# Patient Record
Sex: MALE | Race: WHITE | Employment: OTHER | URBAN - METROPOLITAN AREA
[De-identification: names, ages, dates, MRNs, and addresses within clinical notes are randomized per-mention and may not be internally consistent; named-entity substitution may affect disease eponyms.]

---

## 2017-08-21 PROBLEM — F33.9 RECURRENT MAJOR DEPRESSIVE DISORDER (HCC): Status: ACTIVE | Noted: 2017-08-21

## 2017-09-22 PROBLEM — F41.1 ANXIETY STATE, UNSPECIFIED: Status: ACTIVE | Noted: 2017-09-22

## 2018-03-13 ENCOUNTER — OFFICE VISIT (OUTPATIENT)
Dept: PSYCHOLOGY | Age: 38
End: 2018-03-13
Payer: MEDICARE

## 2018-03-13 DIAGNOSIS — F33.41 RECURRENT MAJOR DEPRESSIVE DISORDER, IN PARTIAL REMISSION (HCC): Primary | ICD-10-CM

## 2018-03-13 DIAGNOSIS — F41.9 ANXIOUSNESS: ICD-10-CM

## 2018-03-13 PROCEDURE — 90832 PSYTX W PT 30 MINUTES: CPT | Performed by: CLINICAL NEUROPSYCHOLOGIST

## 2018-03-13 ASSESSMENT — PATIENT HEALTH QUESTIONNAIRE - PHQ9
SUM OF ALL RESPONSES TO PHQ9 QUESTIONS 1 & 2: 2
1. LITTLE INTEREST OR PLEASURE IN DOING THINGS: 1
SUM OF ALL RESPONSES TO PHQ QUESTIONS 1-9: 2
2. FEELING DOWN, DEPRESSED OR HOPELESS: 1

## 2018-03-13 NOTE — PATIENT INSTRUCTIONS
Heart-Focused Breathin. Center your attention at the level of your heart. 2.Breathing in and out  imagining that the breath is coming from the heart and going through the heart. 3. Attitude breathingthe easiest attitude to start with is \"appreciation\". Other attitudes would include for e.g.  forgiveness, peace, compassion and so on     4. Bring back into next session your own RX for changing attitudes, thoughts, and behaviors that are frustration/anxiety/ periodically sad or apathetic based. Replace them with the stated goals.

## 2018-03-14 ENCOUNTER — HOSPITAL ENCOUNTER (OUTPATIENT)
Age: 38
Discharge: HOME | End: 2018-03-14
Payer: MEDICARE

## 2018-03-14 DIAGNOSIS — G72.9 MYOPATHY: ICD-10-CM

## 2018-03-14 DIAGNOSIS — E11.69 DIABETES MELLITUS TYPE 2 IN OBESE (HCC): ICD-10-CM

## 2018-03-14 DIAGNOSIS — E66.9 DIABETES MELLITUS TYPE 2 IN OBESE (HCC): ICD-10-CM

## 2018-03-14 LAB
HBA1C MFR BLD: 6.2 % (ref 4.8–5.9)
TOTAL CK: 66 U/L (ref 20–200)

## 2018-03-14 PROCEDURE — 36415 COLL VENOUS BLD VENIPUNCTURE: CPT

## 2018-03-14 PROCEDURE — 83036 HEMOGLOBIN GLYCOSYLATED A1C: CPT

## 2018-03-14 PROCEDURE — 82550 ASSAY OF CK (CPK): CPT

## 2018-03-14 PROCEDURE — 82652 VIT D 1 25-DIHYDROXY: CPT

## 2018-03-16 LAB — VITAMIN D 1,25-DIHYDROXY: 48.9 PG/ML (ref 19.9–79.3)

## 2018-03-27 ENCOUNTER — OFFICE VISIT (OUTPATIENT)
Dept: PSYCHOLOGY | Age: 38
End: 2018-03-27
Payer: MEDICARE

## 2018-03-27 DIAGNOSIS — F33.40 RECURRENT MAJOR DEPRESSIVE DISORDER, IN REMISSION (HCC): Primary | ICD-10-CM

## 2018-03-27 DIAGNOSIS — F41.9 ANXIOUSNESS: ICD-10-CM

## 2018-03-27 PROCEDURE — 90832 PSYTX W PT 30 MINUTES: CPT | Performed by: CLINICAL NEUROPSYCHOLOGIST

## 2018-03-27 NOTE — PROGRESS NOTES
Behavioral Health Consultation  Zack Hernandez, Ph.D.  Psychologist  3/27/2018  2:28 PM      Time spent with Patient: 30 minutes  This is patient's 15th [facility] appointment    Reason for Consult: Depression, anxiety, stress, insomnia   Referring Provider: Bing Styles MD    Pt indicated understanding. Feedback given to PCP. S:  \"I use attitude breathing to help with motivation, gerald to learn new things. My work on my career in [address] [place] [address] is going well. I use all the things that we learned in these sessions and apply them to the depressive symptoms and to my career goals. \"    O:  MSE:    Appearance: Appropriately dressed in casual clothing, appropriately groomed, eye contact, good hygiene  Attitude: Cooperative, friendly, mild distress  Consciousness: Alert  Orientation: Oriented to time, place, person, general circumstance  Memory: Remote memory intact; short-term memory variable, per patient self-report   Attention/Concentration: Intact during session  Psychomotor Activity: Normal  Speech: Normal in rate and volume, wellarticulated  Mood: Mildly depressed, mildly anxious  Affect: Current with thought content and mood  Perception: Within normal limits  Thought Content:  Within normal limits   Thought Process: Logical, goalgoal-directed, coherent  Insight: Fair  Judgment: Intact  Morbid ideation: None   Suicide Assessment: No suicidal ideation      History:    Medications:   Current Outpatient Prescriptions   Medication Sig Dispense Refill    metFORMIN (GLUCOPHAGE) 1000 MG tablet Take 1 tablet by mouth daily (with breakfast) 90 tablet 0    betamethasone dipropionate (DIPROLENE) 0.05 % cream Ordered Per Podiatry-Dr. Shoshana Tomlinson  0    mupirocin (BACTROBAN) 2 % ointment Ordered per Podiatry-Dr. Maldonado Marie  0    sertraline (ZOLOFT) 25 MG tablet Take 1 tablet by mouth daily 90 tablet 0    lisinopril (PRINIVIL;ZESTRIL) 10 MG tablet Take 1 tablet by mouth daily 90 tablet 0    pravastatin (PRAVACHOL) 10 MG tablet Take 1

## 2018-04-05 ENCOUNTER — OFFICE VISIT (OUTPATIENT)
Dept: INTERNAL MEDICINE | Age: 38
End: 2018-04-05
Payer: MEDICARE

## 2018-04-05 VITALS
HEART RATE: 92 BPM | RESPIRATION RATE: 18 BRPM | SYSTOLIC BLOOD PRESSURE: 138 MMHG | TEMPERATURE: 98.4 F | WEIGHT: 315 LBS | DIASTOLIC BLOOD PRESSURE: 72 MMHG | BODY MASS INDEX: 42.66 KG/M2 | HEIGHT: 72 IN

## 2018-04-05 DIAGNOSIS — E78.00 PURE HYPERCHOLESTEROLEMIA: ICD-10-CM

## 2018-04-05 DIAGNOSIS — E11.9 CONTROLLED TYPE 2 DIABETES MELLITUS WITHOUT COMPLICATION, WITHOUT LONG-TERM CURRENT USE OF INSULIN (HCC): Primary | ICD-10-CM

## 2018-04-05 DIAGNOSIS — I10 ESSENTIAL HYPERTENSION: ICD-10-CM

## 2018-04-05 DIAGNOSIS — F33.9 RECURRENT MAJOR DEPRESSIVE DISORDER, REMISSION STATUS UNSPECIFIED (HCC): ICD-10-CM

## 2018-04-05 PROCEDURE — 99214 OFFICE O/P EST MOD 30 MIN: CPT | Performed by: INTERNAL MEDICINE

## 2018-04-05 PROCEDURE — 99212 OFFICE O/P EST SF 10 MIN: CPT | Performed by: INTERNAL MEDICINE

## 2018-04-05 PROCEDURE — G8417 CALC BMI ABV UP PARAM F/U: HCPCS | Performed by: INTERNAL MEDICINE

## 2018-04-05 PROCEDURE — 3044F HG A1C LEVEL LT 7.0%: CPT | Performed by: INTERNAL MEDICINE

## 2018-04-05 PROCEDURE — G8427 DOCREV CUR MEDS BY ELIG CLIN: HCPCS | Performed by: INTERNAL MEDICINE

## 2018-04-05 PROCEDURE — 2022F DILAT RTA XM EVC RTNOPTHY: CPT | Performed by: INTERNAL MEDICINE

## 2018-04-05 PROCEDURE — 1036F TOBACCO NON-USER: CPT | Performed by: INTERNAL MEDICINE

## 2018-04-05 RX ORDER — SERTRALINE HYDROCHLORIDE 25 MG/1
25 TABLET, FILM COATED ORAL DAILY
Qty: 90 TABLET | Refills: 1 | Status: SHIPPED | OUTPATIENT
Start: 2018-04-05 | End: 2018-08-28 | Stop reason: SDUPTHER

## 2018-04-05 RX ORDER — LISINOPRIL 10 MG/1
10 TABLET ORAL DAILY
Qty: 90 TABLET | Refills: 1 | Status: SHIPPED | OUTPATIENT
Start: 2018-04-05 | End: 2018-08-28 | Stop reason: SDUPTHER

## 2018-04-05 RX ORDER — GLUCOSAMINE HCL/CHONDROITIN SU 500-400 MG
CAPSULE ORAL
Qty: 100 STRIP | Refills: 2 | Status: SHIPPED | OUTPATIENT
Start: 2018-04-05 | End: 2018-08-28 | Stop reason: SDUPTHER

## 2018-04-05 ASSESSMENT — ENCOUNTER SYMPTOMS
EYES NEGATIVE: 1
RESPIRATORY NEGATIVE: 1
GASTROINTESTINAL NEGATIVE: 1

## 2018-04-10 ENCOUNTER — OFFICE VISIT (OUTPATIENT)
Dept: PSYCHOLOGY | Age: 38
End: 2018-04-10
Payer: MEDICARE

## 2018-04-10 DIAGNOSIS — F33.2 MAJOR DEPRESSIVE DISORDER, RECURRENT SEVERE WITHOUT PSYCHOTIC FEATURES (HCC): Primary | ICD-10-CM

## 2018-04-10 DIAGNOSIS — F41.9 ANXIOUSNESS: ICD-10-CM

## 2018-04-10 PROCEDURE — 90832 PSYTX W PT 30 MINUTES: CPT | Performed by: CLINICAL NEUROPSYCHOLOGIST

## 2018-04-24 ENCOUNTER — OFFICE VISIT (OUTPATIENT)
Dept: PSYCHOLOGY | Age: 38
End: 2018-04-24
Payer: MEDICARE

## 2018-04-24 DIAGNOSIS — F41.9 ANXIOUSNESS: ICD-10-CM

## 2018-04-24 DIAGNOSIS — F33.2 SEVERE EPISODE OF RECURRENT MAJOR DEPRESSIVE DISORDER, WITHOUT PSYCHOTIC FEATURES (HCC): Primary | ICD-10-CM

## 2018-04-24 PROCEDURE — 90832 PSYTX W PT 30 MINUTES: CPT | Performed by: CLINICAL NEUROPSYCHOLOGIST

## 2018-08-28 ENCOUNTER — OFFICE VISIT (OUTPATIENT)
Dept: INTERNAL MEDICINE | Age: 38
End: 2018-08-28
Payer: MEDICARE

## 2018-08-28 VITALS
WEIGHT: 315 LBS | RESPIRATION RATE: 18 BRPM | BODY MASS INDEX: 42.66 KG/M2 | DIASTOLIC BLOOD PRESSURE: 71 MMHG | HEIGHT: 72 IN | HEART RATE: 90 BPM | SYSTOLIC BLOOD PRESSURE: 130 MMHG | TEMPERATURE: 98.4 F

## 2018-08-28 DIAGNOSIS — I10 BENIGN ESSENTIAL HTN: ICD-10-CM

## 2018-08-28 DIAGNOSIS — F32.A DEPRESSION, UNSPECIFIED DEPRESSION TYPE: ICD-10-CM

## 2018-08-28 DIAGNOSIS — Z99.89 OSA ON CPAP: ICD-10-CM

## 2018-08-28 DIAGNOSIS — Z11.4 SCREENING FOR HIV (HUMAN IMMUNODEFICIENCY VIRUS): ICD-10-CM

## 2018-08-28 DIAGNOSIS — E11.9 CONTROLLED TYPE 2 DIABETES MELLITUS WITHOUT COMPLICATION, WITHOUT LONG-TERM CURRENT USE OF INSULIN (HCC): ICD-10-CM

## 2018-08-28 DIAGNOSIS — G47.33 OSA ON CPAP: ICD-10-CM

## 2018-08-28 DIAGNOSIS — E11.69 DIABETES MELLITUS TYPE 2 IN OBESE (HCC): ICD-10-CM

## 2018-08-28 DIAGNOSIS — E66.9 DIABETES MELLITUS TYPE 2 IN OBESE (HCC): ICD-10-CM

## 2018-08-28 DIAGNOSIS — E78.5 HYPERLIPIDEMIA, UNSPECIFIED HYPERLIPIDEMIA TYPE: Primary | ICD-10-CM

## 2018-08-28 DIAGNOSIS — F33.9 RECURRENT MAJOR DEPRESSIVE DISORDER, REMISSION STATUS UNSPECIFIED (HCC): ICD-10-CM

## 2018-08-28 DIAGNOSIS — I10 ESSENTIAL HYPERTENSION: ICD-10-CM

## 2018-08-28 LAB — HBA1C MFR BLD: 6.4 %

## 2018-08-28 PROCEDURE — G8427 DOCREV CUR MEDS BY ELIG CLIN: HCPCS | Performed by: INTERNAL MEDICINE

## 2018-08-28 PROCEDURE — 1036F TOBACCO NON-USER: CPT | Performed by: INTERNAL MEDICINE

## 2018-08-28 PROCEDURE — G8417 CALC BMI ABV UP PARAM F/U: HCPCS | Performed by: INTERNAL MEDICINE

## 2018-08-28 PROCEDURE — 2022F DILAT RTA XM EVC RTNOPTHY: CPT | Performed by: INTERNAL MEDICINE

## 2018-08-28 PROCEDURE — 3044F HG A1C LEVEL LT 7.0%: CPT | Performed by: INTERNAL MEDICINE

## 2018-08-28 PROCEDURE — 99212 OFFICE O/P EST SF 10 MIN: CPT | Performed by: INTERNAL MEDICINE

## 2018-08-28 PROCEDURE — 83036 HEMOGLOBIN GLYCOSYLATED A1C: CPT | Performed by: INTERNAL MEDICINE

## 2018-08-28 PROCEDURE — 99213 OFFICE O/P EST LOW 20 MIN: CPT | Performed by: INTERNAL MEDICINE

## 2018-08-28 RX ORDER — ROSUVASTATIN CALCIUM 20 MG/1
20 TABLET, COATED ORAL NIGHTLY
Qty: 30 TABLET | Refills: 3 | Status: SHIPPED
Start: 2018-08-28 | End: 2020-06-18 | Stop reason: ALTCHOICE

## 2018-08-28 RX ORDER — SERTRALINE HYDROCHLORIDE 25 MG/1
25 TABLET, FILM COATED ORAL DAILY
Qty: 90 TABLET | Refills: 3 | Status: SHIPPED | OUTPATIENT
Start: 2018-08-28 | End: 2019-09-11 | Stop reason: SDUPTHER

## 2018-08-28 RX ORDER — GLUCOSAMINE HCL/CHONDROITIN SU 500-400 MG
CAPSULE ORAL
Qty: 100 STRIP | Refills: 3 | Status: SHIPPED | OUTPATIENT
Start: 2018-08-28 | End: 2019-09-11 | Stop reason: SDUPTHER

## 2018-08-28 RX ORDER — LISINOPRIL 10 MG/1
10 TABLET ORAL DAILY
Qty: 90 TABLET | Refills: 3 | Status: SHIPPED | OUTPATIENT
Start: 2018-08-28 | End: 2019-09-11 | Stop reason: SDUPTHER

## 2018-08-28 RX ORDER — LANCETS 30 GAUGE
EACH MISCELLANEOUS
Qty: 100 EACH | Refills: 3 | Status: SHIPPED | OUTPATIENT
Start: 2018-08-28 | End: 2019-09-11 | Stop reason: SDUPTHER

## 2018-08-28 ASSESSMENT — ENCOUNTER SYMPTOMS
NAUSEA: 0
SORE THROAT: 0
ABDOMINAL PAIN: 0
HEARTBURN: 0
COUGH: 0
ORTHOPNEA: 0
BLURRED VISION: 0
VOMITING: 0
BLOOD IN STOOL: 0
SHORTNESS OF BREATH: 0
SPUTUM PRODUCTION: 0
DIARRHEA: 0
WHEEZING: 0
STRIDOR: 0
HEMOPTYSIS: 0

## 2018-08-28 NOTE — PROGRESS NOTES
(ZOLOFT) 25 MG tablet; Take 1 tablet by mouth daily    Essential hypertension  -     lisinopril (PRINIVIL;ZESTRIL) 10 MG tablet; Take 1 tablet by mouth daily  -     BASIC METABOLIC PANEL; Future    Screening for HIV (human immunodeficiency virus)  -     HIV Screen;  Future    WILL on CPAP    Diabetes mellitus type 2 in obese (HCC)    Benign essential HTN    Depression, unspecified depression type    Other orders  -     Cancel:  DIABETES FOOT EXAM        RTC:     3 months     I have reviewed my findings and recommendations with Sukhwinder Ervin and Dr Chilo Jarvis MD, PGY-3  Internal Medicine Resident     8/28/2018 3:28 PM

## 2018-08-28 NOTE — PATIENT INSTRUCTIONS
Patient Education        Learning About Bariatric Surgery  What is bariatric surgery? Bariatric surgery is surgery to help you lose weight. This type of surgery is only used for people who are very overweight and have not been able to lose weight with diet and exercise. This surgery makes the stomach smaller. Some types of surgery also change the connection between your stomach and intestines. How is bariatric surgery done? Bariatric surgery may be either \"open\" or \"laparoscopic. \" Open surgery is done through a large cut (incision) in the belly. Laparoscopic surgery is done through several small cuts. The doctor puts a lighted tube, or scope, and other surgical tools through small cuts in your belly. The doctor is able to see your organs with the scope. There are different types of bariatric surgery. Gastric sleeve surgery  The surgery is usually done through several small incisions in the belly. The doctor removes more than half of your stomach. This leaves a thin sleeve, or tube, that is about the size of a banana. Because part of your stomach has been removed, this can't be reversed. Nato-en-Y gastric bypass surgery  Nato-en-Y (say \"gab-en-why\") surgery changes the connection between the stomach and the intestines. The doctor separates a section of your stomach from the rest of your stomach. This makes a small pouch. The new pouch will hold the food you eat. The doctor connects the stomach pouch to the middle part of the small intestine. Gastric banding surgery  The surgery is usually done through several small incisions in the belly. The doctor wraps a band around the upper part of the stomach. This creates a small pouch. The small size of the pouch means that you will get full after you eat just a small amount of food. The doctor can inflate or deflate the band to adjust the size. This lets the doctor adjust how quickly food passes from the new pouch into the stomach.  It does not change the to work or their usual routine in about 2 to 4 weeks. Follow-up care is a key part of your treatment and safety. Be sure to make and go to all appointments, and call your doctor if you are having problems. It's also a good idea to know your test results and keep a list of the medicines you take. What happens before surgery?   Surgery can be stressful. This information will help you understand what you can expect. And it will help you safely prepare for surgery.   Preparing for surgery    · Understand exactly what surgery is planned, along with the risks, benefits, and other options. · Tell your doctors ALL the medicines, vitamins, supplements, and herbal remedies you take. Some of these can increase the risk of bleeding or interact with anesthesia.     · If you take blood thinners, such as warfarin (Coumadin), clopidogrel (Plavix), or aspirin, be sure to talk to your doctor. He or she will tell you if you should stop taking these medicines before your surgery. Make sure that you understand exactly what your doctor wants you to do.     · Your doctor will tell you which medicines to take or stop before your surgery. You may need to stop taking certain medicines a week or more before surgery. So talk to your doctor as soon as you can.     · If you have an advance directive, let your doctor know. It may include a living will and a durable power of  for health care. Bring a copy to the hospital. If you don't have one, you may want to prepare one. It lets your doctor and loved ones know your health care wishes. Doctors advise that everyone prepare these papers before any type of surgery or procedure.     · You may need to follow a clear liquid diet for several days before surgery. Your doctor will tell you how to do this. What happens on the day of surgery? · Follow the instructions exactly about when to stop eating and drinking. If you don't, your surgery may be canceled.  If your doctor told you to take your medicines on the day of surgery, take them with only a sip of water.     · Take a bath or shower before you come in for your surgery. Do not apply lotions, perfumes, deodorants, or nail polish.     · Do not shave the surgical site yourself.     · Take off all jewelry and piercings. And take out contact lenses, if you wear them.    At the hospital or surgery center   · Bring a picture ID.     · The area for surgery is often marked to make sure there are no errors.     · You will be kept comfortable and safe by your anesthesia provider. You will be asleep during the surgery.     · The surgery will take about 2 to 3 hours. Going home   · Be sure you have someone to drive you home. Anesthesia and pain medicine make it unsafe for you to drive.     · You will be given more specific instructions about recovering from your surgery. They will cover things like diet, wound care, follow-up care, driving, and getting back to your normal routine. When should you call your doctor? · You have questions or concerns.     · You don't understand how to prepare for your surgery.     · You become ill before the surgery (such as fever, flu, or a cold).     · You need to reschedule or have changed your mind about having the surgery. Where can you learn more? Go to [email or web address] and sign in to your [email or web address] account. Enter J026 in the [facility] box to learn more about \"Laparoscopic Nato-en-Y Gastric Bypass: Before Your Surgery. \"     If you do not have an account, please click on the \"Sign Up Now\" link. Current as of: September 10, 2017  Content Version: 11.7  © 0580-3417 "[employer]. Care instructions adapted under license by [facility] ([facility]). If you have questions about a medical condition or this instruction, always ask your healthcare professional. [address]  any warranty or liability for your use of this information.        Please start crestor 20

## 2018-10-29 ENCOUNTER — TELEPHONE (OUTPATIENT)
Dept: INTERNAL MEDICINE | Age: 38
End: 2018-10-29

## 2018-12-13 ENCOUNTER — TELEPHONE (OUTPATIENT)
Dept: ADMINISTRATIVE | Age: 38
End: 2018-12-13

## 2019-08-06 ENCOUNTER — TELEPHONE (OUTPATIENT)
Dept: INTERNAL MEDICINE | Age: 39
End: 2019-08-06

## 2019-08-06 NOTE — TELEPHONE ENCOUNTER
Pt requests med refills but has missed his last 2 appts and has not been seen since 08/2018 left message for pt to call to schedule an appt for this week so that his meds can be refilled

## 2019-09-06 ENCOUNTER — TELEPHONE (OUTPATIENT)
Dept: INTERNAL MEDICINE | Age: 39
End: 2019-09-06

## 2019-09-11 ENCOUNTER — OFFICE VISIT (OUTPATIENT)
Dept: INTERNAL MEDICINE | Age: 39
End: 2019-09-11
Payer: MEDICARE

## 2019-09-11 VITALS
WEIGHT: 315 LBS | BODY MASS INDEX: 42.66 KG/M2 | HEART RATE: 91 BPM | RESPIRATION RATE: 18 BRPM | DIASTOLIC BLOOD PRESSURE: 78 MMHG | TEMPERATURE: 97.6 F | SYSTOLIC BLOOD PRESSURE: 123 MMHG | HEIGHT: 72 IN

## 2019-09-11 DIAGNOSIS — E11.9 CONTROLLED TYPE 2 DIABETES MELLITUS WITHOUT COMPLICATION, WITHOUT LONG-TERM CURRENT USE OF INSULIN (HCC): Primary | ICD-10-CM

## 2019-09-11 DIAGNOSIS — E66.9 DIABETES MELLITUS TYPE 2 IN OBESE (HCC): ICD-10-CM

## 2019-09-11 DIAGNOSIS — E11.69 DIABETES MELLITUS TYPE 2 IN OBESE (HCC): ICD-10-CM

## 2019-09-11 DIAGNOSIS — E66.01 MORBID OBESITY WITH BMI OF 50.0-59.9, ADULT (HCC): ICD-10-CM

## 2019-09-11 DIAGNOSIS — E66.01 MORBID OBESITY (HCC): ICD-10-CM

## 2019-09-11 DIAGNOSIS — F33.9 RECURRENT MAJOR DEPRESSIVE DISORDER, REMISSION STATUS UNSPECIFIED (HCC): ICD-10-CM

## 2019-09-11 DIAGNOSIS — I10 ESSENTIAL HYPERTENSION: ICD-10-CM

## 2019-09-11 PROBLEM — F41.1 GAD (GENERALIZED ANXIETY DISORDER): Status: ACTIVE | Noted: 2019-09-11

## 2019-09-11 LAB — HBA1C MFR BLD: 7.3 %

## 2019-09-11 PROCEDURE — 3045F PR MOST RECENT HEMOGLOBIN A1C LEVEL 7.0-9.0%: CPT | Performed by: INTERNAL MEDICINE

## 2019-09-11 PROCEDURE — G8417 CALC BMI ABV UP PARAM F/U: HCPCS | Performed by: INTERNAL MEDICINE

## 2019-09-11 PROCEDURE — 1036F TOBACCO NON-USER: CPT | Performed by: INTERNAL MEDICINE

## 2019-09-11 PROCEDURE — 83036 HEMOGLOBIN GLYCOSYLATED A1C: CPT | Performed by: INTERNAL MEDICINE

## 2019-09-11 PROCEDURE — G8427 DOCREV CUR MEDS BY ELIG CLIN: HCPCS | Performed by: INTERNAL MEDICINE

## 2019-09-11 PROCEDURE — 99214 OFFICE O/P EST MOD 30 MIN: CPT | Performed by: INTERNAL MEDICINE

## 2019-09-11 PROCEDURE — 99213 OFFICE O/P EST LOW 20 MIN: CPT | Performed by: INTERNAL MEDICINE

## 2019-09-11 PROCEDURE — 2022F DILAT RTA XM EVC RTNOPTHY: CPT | Performed by: INTERNAL MEDICINE

## 2019-09-11 RX ORDER — GLUCOSAMINE HCL/CHONDROITIN SU 500-400 MG
CAPSULE ORAL
Qty: 100 STRIP | Refills: 3 | Status: SHIPPED
Start: 2019-09-11 | End: 2020-03-12 | Stop reason: SDUPTHER

## 2019-09-11 RX ORDER — LISINOPRIL 10 MG/1
10 TABLET ORAL DAILY
Qty: 90 TABLET | Refills: 3 | Status: SHIPPED
Start: 2019-09-11 | End: 2020-03-12 | Stop reason: SDUPTHER

## 2019-09-11 RX ORDER — LANCETS 30 GAUGE
EACH MISCELLANEOUS
Qty: 100 EACH | Refills: 3 | Status: SHIPPED
Start: 2019-09-11 | End: 2020-03-12 | Stop reason: SDUPTHER

## 2019-09-11 NOTE — PROGRESS NOTES
current use of insulin (HCC)  -     POCT glycosylated hemoglobin (Hb A1C)    Recurrent major depressive disorder, remission status unspecified ([facility]    Essential hypertension        I have reviewed all pertient PMHx, PSHx, FamHx, Social Hx, medications, and allergies and updated history as appropriate. RTC:    I have reviewed my findings and recommendations with Maurizio Madrigal and Dr Kurtis Gonsalez.     Juan David Jacobs MD PGY-1   9/11/2019 2:33 PM

## 2019-09-13 PROBLEM — E66.01 MORBID OBESITY WITH BMI OF 50.0-59.9, ADULT (HCC): Status: ACTIVE | Noted: 2019-09-13

## 2020-03-12 ENCOUNTER — OFFICE VISIT (OUTPATIENT)
Dept: INTERNAL MEDICINE | Age: 40
End: 2020-03-12
Payer: MEDICARE

## 2020-03-12 VITALS
HEIGHT: 72 IN | RESPIRATION RATE: 16 BRPM | TEMPERATURE: 98.2 F | SYSTOLIC BLOOD PRESSURE: 134 MMHG | WEIGHT: 315 LBS | DIASTOLIC BLOOD PRESSURE: 78 MMHG | HEART RATE: 92 BPM | BODY MASS INDEX: 42.66 KG/M2

## 2020-03-12 LAB — HBA1C MFR BLD: 7 %

## 2020-03-12 PROCEDURE — G8427 DOCREV CUR MEDS BY ELIG CLIN: HCPCS | Performed by: INTERNAL MEDICINE

## 2020-03-12 PROCEDURE — 99213 OFFICE O/P EST LOW 20 MIN: CPT | Performed by: INTERNAL MEDICINE

## 2020-03-12 PROCEDURE — 1036F TOBACCO NON-USER: CPT | Performed by: INTERNAL MEDICINE

## 2020-03-12 PROCEDURE — G8484 FLU IMMUNIZE NO ADMIN: HCPCS | Performed by: INTERNAL MEDICINE

## 2020-03-12 PROCEDURE — G8417 CALC BMI ABV UP PARAM F/U: HCPCS | Performed by: INTERNAL MEDICINE

## 2020-03-12 PROCEDURE — 2022F DILAT RTA XM EVC RTNOPTHY: CPT | Performed by: INTERNAL MEDICINE

## 2020-03-12 PROCEDURE — 3046F HEMOGLOBIN A1C LEVEL >9.0%: CPT | Performed by: INTERNAL MEDICINE

## 2020-03-12 PROCEDURE — 83036 HEMOGLOBIN GLYCOSYLATED A1C: CPT | Performed by: INTERNAL MEDICINE

## 2020-03-12 RX ORDER — GLUCOSAMINE HCL/CHONDROITIN SU 500-400 MG
CAPSULE ORAL
Qty: 100 STRIP | Refills: 2 | Status: SHIPPED
Start: 2020-03-12 | End: 2020-09-03 | Stop reason: SDUPTHER

## 2020-03-12 RX ORDER — LISINOPRIL 10 MG/1
10 TABLET ORAL DAILY
Qty: 90 TABLET | Refills: 0 | Status: SHIPPED
Start: 2020-03-12 | End: 2020-09-03 | Stop reason: SDUPTHER

## 2020-03-12 RX ORDER — LANCETS 30 GAUGE
EACH MISCELLANEOUS
Qty: 100 EACH | Refills: 2 | Status: SHIPPED
Start: 2020-03-12 | End: 2020-09-03 | Stop reason: SDUPTHER

## 2020-03-12 ASSESSMENT — ENCOUNTER SYMPTOMS
VOMITING: 0
COUGH: 0
ABDOMINAL PAIN: 0
DIARRHEA: 0
SORE THROAT: 0
SHORTNESS OF BREATH: 0
NAUSEA: 0

## 2020-03-12 NOTE — PATIENT INSTRUCTIONS
Thank you for coming to your appointment today. Please make sure to do the following:  - New changes in medication:   - Continue the medications as listed  - Get labs drawn (Nothing to eat or drink for 12 hours prior to having your blood work done) before next visit  - Schedule your appointments    Referrals have been made to surgery [phone] and podiatry [phone]: If you do not hear from the office in 7-10 days, please call the number listed. Follow up as scheduled. Please call with any questions or concerns.  [phone]

## 2020-03-12 NOTE — PROGRESS NOTES
[facility]  InternalMedicine Residency Program  ACC Note      SUBJECTIVE:  CC: had no chief complaint listed for this encounter. HPI:Maurizio De Jesus 41 y.o. male with PMH of HTN, HLD, T2DM, WILL on CPAP, morbid obesity, anxiety/depression, presents to the [facility] for routine visit and meds refill. He has been doing well. Denies any pain today. Weight loss education given in room. Patient's past medical history and medication list reviewed. Lab results reviewed. All questions answered. Review of Systems   Constitutional: Negative for chills and fever. HENT: Negative for sore throat. Respiratory: Negative for cough and shortness of breath. Cardiovascular: Negative for chest pain and leg swelling. Gastrointestinal: Negative for abdominal pain, diarrhea, nausea and vomiting. Genitourinary: Negative for dysuria and frequency. Neurological: Negative for headaches. Current Outpatient Medications on File Prior to Visit   Medication Sig Dispense Refill    metFORMIN (GLUCOPHAGE) 1000 MG tablet Take 1 tablet by mouth 2 times daily (with meals) 60 tablet 0    sertraline (ZOLOFT) 50 MG tablet Take 1 tablet by mouth daily 30 tablet 0    lisinopril (PRINIVIL;ZESTRIL) 10 MG tablet Take 1 tablet by mouth daily 90 tablet 3    blood glucose monitor strips Accucheck meter-pt check BS daily. 100 strip 3    Lancets MISC Accucheck or what insurance covers. 100 each 3    rosuvastatin (CRESTOR) 20 MG tablet Take 1 tablet by mouth nightly (Patient not taking: Reported on 9/11/2019) 30 tablet 3    betamethasone dipropionate (DIPROLENE) 0.05 % cream Ordered Per Podiatry-Dr. Juan R Dang  0    mupirocin (BACTROBAN) 2 % ointment Ordered per Podiatry-Dr. Brian Flores  0    Misc. Devices MISC Give one Accucheck glucometer monitor or whatever the insurance approves.  1 Device 0    LORATADINE PO Take 10 mg by mouth as needed       No current facility-administered medications on file prior to visit. OBJECTIVE:    VS: There were no vitals taken for this visit. Physical Exam  HENT:      Head: Normocephalic. Neck:      Musculoskeletal: Normal range of motion and neck supple. Cardiovascular:      Rate and Rhythm: Normal rate and regular rhythm. Heart sounds: Normal heart sounds. No murmur. Pulmonary:      Breath sounds: Normal breath sounds. No wheezing or rales. Abdominal:      General: Bowel sounds are normal. There is no distension. Palpations: Abdomen is soft. Tenderness: There is no abdominal tenderness. Lymphadenopathy:      Cervical: No cervical adenopathy. Skin:     General: Skin is warm and dry. Neurological:      Mental Status: He is alert. ASSESSMENT/PLAN:  There are no diagnoses linked to this encounter. HTN-controlled  - Continue lisinopril 10mg daily     HLD  - lipid panel ordered, not done yet  - Self stopped rosuvastatin due to tingling in finger and did not tolerate atorvastatin due to cramps  - [facility] lipid panel in 2017. ASCVD score 7.9%  - f/u repeat lipid panel. Consider to start pravastatin next visit    T2DM  - HbA1C 7.3 (9/11/2019)- 7.0  today   - Cont metformin 1000mg BID    WILL on CPAP    Morbid obesity, class 3  - Made surgery referral for possible bariatic intervention    Anxiety/depression  - on citalopram to 50mg  - Seen by Kathe Watkins  - Seen by ophthalmology in Sep or Oct last year. Eye exam was negative. Will get record   - Made referral to podiatry  - Ordered BMP, lipids not done yet    RTC:  in 3 months. Call in if having any questions.     I have reviewed my findings and recommendations with Abel Dickey and Dr Fabian Habermann, MD PGY-3  3/12/2020 3:05 PM

## 2020-04-08 ENCOUNTER — TELEPHONE (OUTPATIENT)
Dept: INTERNAL MEDICINE | Age: 40
End: 2020-04-08

## 2020-04-08 NOTE — TELEPHONE ENCOUNTER
Received fax refill request from pharmacy. Pharmacy asking for a refill on metformin. Spoke with pharmacy staff to confirm they have script on file from 03/12/2020. States script is on file and no new order needed.

## 2020-05-06 ENCOUNTER — TELEMEDICINE (OUTPATIENT)
Dept: BARIATRICS/WEIGHT MGMT | Age: 40
End: 2020-05-06
Payer: MEDICARE

## 2020-05-06 PROCEDURE — 99204 OFFICE O/P NEW MOD 45 MIN: CPT | Performed by: SURGERY

## 2020-05-06 PROCEDURE — G8428 CUR MEDS NOT DOCUMENT: HCPCS | Performed by: SURGERY

## 2020-05-06 PROCEDURE — 3051F HG A1C>EQUAL 7.0%<8.0%: CPT | Performed by: SURGERY

## 2020-05-06 PROCEDURE — 1036F TOBACCO NON-USER: CPT | Performed by: SURGERY

## 2020-05-06 PROCEDURE — 2022F DILAT RTA XM EVC RTNOPTHY: CPT | Performed by: SURGERY

## 2020-05-06 PROCEDURE — G8417 CALC BMI ABV UP PARAM F/U: HCPCS | Performed by: SURGERY

## 2020-05-06 NOTE — PROGRESS NOTES
Patient has tried the following programs to lose weight in the past, but none have yielded substantial, long-term success: Yes Atkins   No Herbal Life    No Megan Black  No [place] Weight Loss    No Liquid protein fast  No Medifast    No Optifast   No Overeaters Anonymous    No Henry Arbour  Yes Slim Fast    No Zoe Powter  No TOPS    No [place] [employer] Watchers  No Dexatrim    No Redux   No Fenfluramine    No Meridia   No Phentermine    No Xenical   No Physician Supervised    Yes Self-Imposed      Other: Total number of years dietin years of dieting off and on.

## 2020-05-06 NOTE — PROGRESS NOTES
5/4/2020) 30 tablet 3    betamethasone dipropionate (DIPROLENE) 0.05 % cream Ordered Per Podiatry-Dr. Arellano Ege  0    mupirocin (BACTROBAN) 2 % ointment Ordered per Podiatry-Dr. Edwar Alonso  0    Misc. Devices MISC Give one Accucheck glucometer monitor or whatever the insurance approves. 1 Device 0    LORATADINE PO Take 10 mg by mouth as needed       No current facility-administered medications for this visit. Social History:   TOBACCO:   reports that he has never smoked. He has never used smokeless tobacco.  All smokers must join the free smoking cessation program and stop smoking for 3 months before having any Bariatric surgery. ETOH:    reports previous alcohol use. The patient has a family history that is negative for severe cardiovascular or respiratory issues, negative for reaction to anesthesia.       Review of Systems    Review of Systems - General ROS: negative for - chills, fatigue or malaise  ENT ROS: negative for - hearing change, nasal congestion or nasal discharge  Allergy and Immunology ROS: negative for - hives, itchy/watery eyes or nasal congestion  Hematological and Lymphatic ROS: negative for - blood clots, blood transfusions, bruising or fatigue  Endocrine ROS: negative for - malaise/lethargy, mood swings, palpitations or polydipsia/polyuria  Breast ROS: negative for - new or changing breast lumps or nipple changes  Respiratory ROS: negative for - sputum changes, stridor, tachypnea or wheezing  Cardiovascular ROS: negative for - irregular heartbeat, loss of consciousness, murmur or orthopnea  Gastrointestinal ROS: negative for - constipation, diarrhea, gas/bloating, heartburn or hematemesis  Genito-Urinary ROS: negative for - genital discharge, genital ulcers or hematuria  Musculoskeletal ROS: negative for - gait disturbance, muscle pain or muscular weakness}    Physical Exam:   Not done due to virtual visit      Assessment:  Morbid obesity with inability to keep the weight off with diet and exercise. He is interested in Laparoscopic Nato-en- Y Gastric Bypass or Sleeve Gastrectomy. We discussed that our goal is to ameliorate the medical problems and not to obtain a specific body mass index. He understands the risks and benefits, and wishes to proceed with the evaluation. Plan:  Psych evaluation, medical and cardio/pulmonary clearance, gallbladder ultrasound. These patients often have vitamin deficiencies so I will do screening labs for malnutrition. I will do an upper endoscopy to check for hiatal hernias, ulcers and H. Pylori while we wait for insurance approval for the surgery. Physician Signature: Electronically signed by Dr. George Coley MD    Send copy of H&P to PCP, Elbert Cooks, MD    2020    TELEHEALTH EVALUATION -- Audio/Visual (During EQAWO-66 public health emergency)    HPI:    Johnny Byrnes (:  1980) has requested an audio/video evaluation for the following concern(s):    As above    Review of Systems    Prior to Visit Medications    Medication Sig Taking? Authorizing Provider   lisinopril (PRINIVIL;ZESTRIL) 10 MG tablet Take 1 tablet by mouth daily  Marcy Cordon MD   blood glucose monitor strips Accucheck meter-pt check BS daily. Marcy Cordon MD   Lancets MISC Accucheck or what insurance covers. Marcy Cordon MD   sertraline (ZOLOFT) 50 MG tablet Take 1 tablet by mouth daily  Marcy Cordon MD   metFORMIN (GLUCOPHAGE) 1000 MG tablet Take 1 tablet by mouth 2 times daily (with meals)  Marcy Cordon MD   rosuvastatin (CRESTOR) 20 MG tablet Take 1 tablet by mouth nightly  Patient not taking: Reported on 2020  Rosalva Gaitan MD   betamethasone dipropionate (DIPROLENE) 0.05 % cream Ordered Per Podiatry-Dr. Bhavesh Cardenas  Historical Provider, MD   pirocin [facility]) 2 % ointment Ordered per Podiatry-Dr. Shaquille Nieves  Historical Provider, MD   Misc. Devices MISC Give one Accucheck glucometer monitor or whatever the insurance approves.   Vandana Ovalle MD   LORATADINE PO Take 10 mg by mouth as needed  Historical Provider, MD       Social History     Tobacco Use    Smoking status: Never Smoker    Smokeless tobacco: Never Used   Substance Use Topics    Alcohol use: Not Currently     Comment: occasional    Drug use: No        Allergies   Allergen Reactions    Seasonal      Takes Claritin    ,   Past Medical History:   Diagnosis Date    Benign essential HTN     Depression     Diabetes mellitus type 2 in obese ([address]     GERD without esophagitis     Hyperlipemia     Latex allergy     Morbid obesity due to excess calories (HCC)     WILL on CPAP    , No past surgical history on file.,   Social History     Tobacco Use    Smoking status: Never Smoker    Smokeless tobacco: Never Used   Substance Use Topics    Alcohol use: Not Currently     Comment: occasional    Drug use: No       PHYSICAL EXAMINATION:  [ INSTRUCTIONS:  \"[x]\" Indicates a positive item  \"[]\" Indicates a negative item  -- DELETE ALL ITEMS NOT EXAMINED]  Vital Signs: (As obtained by patient/caregiver or practitioner observation)    Blood pressure-  Heart rate-    Respiratory rate-    Temperature-  Pulse oximetry-     Constitutional: [] Appears well-developed and well-nourished [] No apparent distress      [] Abnormal-   Mental status  [] Alert and awake  [] Oriented to person/place/time []Able to follow commands      Eyes:  EOM    []  Normal  [] Abnormal-  Sclera  []  Normal  [] Abnormal -         Discharge []  None visible  [] Abnormal -    HENT:   [] Normocephalic, atraumatic.   [] Abnormal   [] Mouth/Throat: Mucous membranes are moist.     External Ears [] Normal  [] Abnormal-     Neck: [] No visualized mass     Pulmonary/Chest: [] Respiratory effort normal.  [] No visualized signs of difficulty breathing or respiratory distress        [] Abnormal-      Musculoskeletal:   [] Normal gait with no signs of ataxia         [] Normal range of motion of neck        [] Abnormal-       Neurological:        [] No Facial

## 2020-05-07 ENCOUNTER — TELEPHONE (OUTPATIENT)
Dept: BARIATRICS/WEIGHT MGMT | Age: 40
End: 2020-05-07

## 2020-05-07 NOTE — TELEPHONE ENCOUNTER
Per order of Dr. Allegra Oneill patient is ready to be scheduled for pre op testing   Call placed ton patient and answering machine on. Message left to recall.

## 2020-05-08 ENCOUNTER — TELEPHONE (OUTPATIENT)
Dept: BARIATRICS/WEIGHT MGMT | Age: 40
End: 2020-05-08

## 2020-05-08 NOTE — TELEPHONE ENCOUNTER
SW called PT to schedule evaluation for bariatric surgery but PT was unavailable. Left v-mail requesting that the PT call to schedule the appointment.      TAM Cash

## 2020-05-20 ENCOUNTER — INITIAL CONSULT (OUTPATIENT)
Dept: BARIATRICS/WEIGHT MGMT | Age: 40
End: 2020-05-20
Payer: MEDICARE

## 2020-05-20 VITALS — HEIGHT: 72 IN | BODY MASS INDEX: 42.66 KG/M2 | WEIGHT: 315 LBS

## 2020-05-20 PROCEDURE — 99999 PR OFFICE/OUTPT VISIT,PROCEDURE ONLY: CPT | Performed by: DIETITIAN, REGISTERED

## 2020-05-20 NOTE — PATIENT INSTRUCTIONS
sure to check with us to see if we have received your psychological evaluation. Please be aware that any co-pays or deductibles may be requested prior to testing and / or procedures. You will need to schedule a psychological evaluation for weight loss surgery. Patients will be required to complete all psychological testing as required by the psychologist. Patients must follow all of the psychologist's recommendations before weight loss surgery can be scheduled. The evaluation must be done a standard way for weight loss surgery. We strongly recommend that you contact one of our preferred providers listed below to arrange this:    Shivam Hinkle, [address] [facility]                                                              [address] [place] [address]                                                                                      [phone]     Dr. Kian Olvera, PhD           [facility]                                                              [address]                                                                                                [phone]        Dr. Joe Chapman, PhD                         Stacey Leon. [place], [place]                                                                                              [phone]     You will also need to plan on attending a 2 hour nutrition class at the [facility] prior to your surgery. We will schedule this for you when we schedule your surgery. Please remember to have your labs drawn prior to your scheduled appointment to review the results of your testing. Please remember, that while we will submit your case to insurance for surgery authorization, it is your responsibility to know if your plan covers weight loss surgery and keep up-to-date with changes to your insurance coverage.   We will do everything possible to help you get approved for weight loss surgery, but cannot guarantee an approval.      Please note that you will not be submitted to your insurance company until all   pre-operative testing requirements are met. · Please remember you need to schedule to attend one Support Group meeting held at the [facility] before surgery. This one meeting is mandatory. You can attend as many support group meetings before and after surgery. Support group meetings are held at the [facility] from 6:00 - 8:00pm 1st, and 3rd Tuesday of every month. See dates listed below. · Each individual person has his / her own medical requirements that need fulfilled before being able to schedule bariatric surgery . Please finalize these requirements by contacting our Bariatric Nurse at [phone].

## 2020-06-01 ENCOUNTER — TELEMEDICINE (OUTPATIENT)
Dept: BARIATRICS/WEIGHT MGMT | Age: 40
End: 2020-06-01
Payer: MEDICARE

## 2020-06-01 PROCEDURE — 90791 PSYCH DIAGNOSTIC EVALUATION: CPT | Performed by: SOCIAL WORKER

## 2020-06-01 NOTE — PATIENT INSTRUCTIONS
Assignments/Recommendations: 3-4 follow-up sessions with SW for further education/evaluation. Complete entries in \"Why We Eat\" and \"Reality Journal\" to discuss next session. Attend [facility] support group. Follow-up with: (referrals/present providers/all scheduled appointments at [facility].
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
## 2020-06-02 ENCOUNTER — PREP FOR PROCEDURE (OUTPATIENT)
Dept: SURGERY | Age: 40
End: 2020-06-02

## 2020-06-02 RX ORDER — SODIUM CHLORIDE, SODIUM LACTATE, POTASSIUM CHLORIDE, CALCIUM CHLORIDE 600; 310; 30; 20 MG/100ML; MG/100ML; MG/100ML; MG/100ML
INJECTION, SOLUTION INTRAVENOUS CONTINUOUS
Status: CANCELLED | OUTPATIENT
Start: 2020-06-02

## 2020-06-08 ENCOUNTER — HOSPITAL ENCOUNTER (OUTPATIENT)
Age: 40
Discharge: HOME | End: 2020-06-08
Payer: MEDICARE

## 2020-06-08 ENCOUNTER — HOSPITAL ENCOUNTER (OUTPATIENT)
Dept: ULTRASOUND IMAGING | Age: 40
Discharge: HOME | End: 2020-06-10
Payer: MEDICARE

## 2020-06-08 LAB
ANION GAP SERPL CALCULATED.3IONS-SCNC: 11 MMOL/L (ref 7–16)
BUN BLDV-MCNC: 7 MG/DL (ref 6–20)
CALCIUM SERPL-MCNC: 9.4 MG/DL (ref 8.6–10.2)
CHLORIDE BLD-SCNC: 100 MMOL/L (ref 98–107)
CHOLESTEROL, TOTAL: 148 MG/DL (ref 0–199)
CO2: 27 MMOL/L (ref 22–29)
CREAT SERPL-MCNC: 0.9 MG/DL (ref 0.7–1.2)
CREATININE URINE: 131 MG/DL (ref 40–278)
GFR AFRICAN AMERICAN: >60
GFR NON-AFRICAN AMERICAN: >60 ML/MIN/1.73
GLUCOSE BLD-MCNC: 153 MG/DL (ref 74–99)
HDLC SERPL-MCNC: 26 MG/DL
LDL CHOLESTEROL CALCULATED: 91 MG/DL (ref 0–99)
MICROALBUMIN UR-MCNC: <12 MG/L
MICROALBUMIN/CREAT UR-RTO: ABNORMAL (ref 0–30)
POTASSIUM SERPL-SCNC: 4.2 MMOL/L (ref 3.5–5)
SODIUM BLD-SCNC: 138 MMOL/L (ref 132–146)
TRIGL SERPL-MCNC: 153 MG/DL (ref 0–149)
VLDLC SERPL CALC-MCNC: 31 MG/DL

## 2020-06-08 PROCEDURE — 80061 LIPID PANEL: CPT

## 2020-06-08 PROCEDURE — 36415 COLL VENOUS BLD VENIPUNCTURE: CPT

## 2020-06-08 PROCEDURE — 82570 ASSAY OF URINE CREATININE: CPT

## 2020-06-08 PROCEDURE — 82044 UR ALBUMIN SEMIQUANTITATIVE: CPT

## 2020-06-08 PROCEDURE — 76705 ECHO EXAM OF ABDOMEN: CPT

## 2020-06-08 PROCEDURE — 80048 BASIC METABOLIC PNL TOTAL CA: CPT

## 2020-06-12 ENCOUNTER — TELEPHONE (OUTPATIENT)
Dept: BARIATRICS/WEIGHT MGMT | Age: 40
End: 2020-06-12

## 2020-06-12 NOTE — TELEPHONE ENCOUNTER
Bart Watson called the pt and reviewed with the pt all the different protein supplements on the market with all the different protein taste, flavors and textures that I have tried along with different recipes and different ways to mix the protein powder in. Bart Watson reviewed pre-op and post-op supplement usage. Bart Watson also reviewed he would receive even more education on this when we teach his two hour RYGB class. Bart Watson spent time reviewing ready made drinks versus powders with the pt to give the pt different options. Pt verbalized understanding.

## 2020-06-15 ENCOUNTER — HOSPITAL ENCOUNTER (OUTPATIENT)
Age: 40
Discharge: HOME | End: 2020-06-17
Payer: MEDICARE

## 2020-06-15 PROCEDURE — U0003 INFECTIOUS AGENT DETECTION BY NUCLEIC ACID (DNA OR RNA); SEVERE ACUTE RESPIRATORY SYNDROME CORONAVIRUS 2 (SARS-COV-2) (CORONAVIRUS DISEASE [COVID-19]), AMPLIFIED PROBE TECHNIQUE, MAKING USE OF HIGH THROUGHPUT TECHNOLOGIES AS DESCRIBED BY CMS-2020-01-R: HCPCS

## 2020-06-16 LAB
SARS-COV-2: NOT DETECTED
SOURCE: NORMAL

## 2020-06-18 ENCOUNTER — TELEPHONE (OUTPATIENT)
Dept: BARIATRICS/WEIGHT MGMT | Age: 40
End: 2020-06-18

## 2020-06-18 ENCOUNTER — VIRTUAL VISIT (OUTPATIENT)
Dept: BARIATRICS/WEIGHT MGMT | Age: 40
End: 2020-06-18
Payer: MEDICARE

## 2020-06-18 PROCEDURE — 99999 PR OFFICE/OUTPT VISIT,PROCEDURE ONLY: CPT | Performed by: DIETITIAN, REGISTERED

## 2020-06-18 NOTE — PROGRESS NOTES
Weight Loss Assessment: Completed by Nutrition Services RD/MYRIAM Certified in Adult Weight Management:  Phone Number:  [phone]  Fax Number:   [phone]    Boy Patel   6/18/20  Weight Loss Appointment: 2nd Weight Loss Appt      Wt Readings from Last 3 Encounters:   05/20/20 (!) 375 lb (170.1 kg)   03/12/20 (!) 382 lb (173.3 kg)   09/11/19 (!) 385 lb 8 oz (174.9 kg)        375 lbs  IBW: 164 lbs         % IBW: 229%       % EBWL: 0%           ABW: 217 lbs  % ABW: 173%       BMI: There is no height or weight on file to calculate BMI. Patient's 24 Hour Recall:  Breakfast: Protein Shake - Isopure  Snack: Carrots, Tomatoes, Green Peppers  Lunch: Salad -  Spinach, Mushrooms, Carrots, Egg, 4 - [employer], Worcestershire Sauce and Vinegar  Snack: Carrots  Dinner: 4 oz Chicken Breast and 1/2 bag of Broccoli with Mushrooms, Onions and Green Peppers  Snack: None  Water Intake: 1 Gallon daily with apples and lime in it. Other Beverages: None  Exercise: ADL's - Pt states he is going up and down the steps a lot more. Does the patient feel he/she achieved last week's weight loss goals:Yes     Education:   Rd// Myriam enc the following at today's visit for successful post-surgical Weight Maintenance    1. Weigh yourself daily and record it. 2. Keep documented food records daily   3. 220-225 minutes a week of moderate physical activity   4. Just be more active in day to day routine   5. Higher protein intake and a higher fiber intake. Not a high protein or a high fiber diet just a higher intake.     Bart Watson addressed the following with the pt:  - Bart Watson enc pt to comply with nutrition recommendations  - Bart Watson enc to go back maintenance of regular physical activity  - Periodic assessment to prevent and treat eating or other psychiatric disorders   - Bart / Myriam enc Participation in support group meetings  - Bart Watson enc pt to go back to maintenance of food records and weight monitoring records   - Bart Watson reviewed the importance of adequate sleep and stress management  - Bart Watson reviewed nonfood strategies to cope with emotions and stress  - Bart Watson encouraged pt to practice the following: Mindful eating: Eating slowly: Focusing on the eating experience without   Distraction.  - Bart Watson enc. pt to pay attention to hunger and fullness  - Bart / Walter enc meal planning  - Bart / Tammy Mccray pt to chose nutrient dense whole foods instead of soft, high calorie foods  - Bart / Walter enc not dr Michael Phillips large amounts of fluids with or immediately after meals    Portion control ,meal planning and avoiding empty calorie consumption. Weight loss goal for next follow-up appointment: 8 to 10 lbs    Patient has established the following three goals for the next follow-up appointment. 1.Pt wants to replace his snacks with a mid day shake to help control his appetite, meet protein needs and increase weight loss results. Bart Watson recommends pt only have two shakes a day if he is eating meat at one meal.  However if pt is eating meat at two meals for the day he should only have 1 shake a day in order to not exceed his protein needs. Pts daily protein intake should be 75 - 85 grams total daily based on IBW. 2. Pt wants to work on increasing exercise and physical activity. 3. Pt wants to reduce carbohydrate intake. Pt is going to look to see where he may be getting in extra carbohydrates within his diet and try to reduce carbohydrate intake. Bart Watson feels based on 24 Hour Recall pt does a nice job at limiting carbohydrate consumption. Enc pt to continue to practice portion control and meal planning.      Patient has established the following exercise goal for next follow-up appointment:  ADL's - 3 Day's A Week - Walking - Duration: 10 - 15 minutes    Did the patient keep food records:No    Pt. is aware if they do not comply with The [id number] and [facility] Guidelines that this can lead to the patient being dismissed from the

## 2020-06-18 NOTE — PROGRESS NOTES
[facility]                                                                                                                    PRE OP INSTRUCTIONS FOR  Nahomy Castro        Date: 6/18/2020    Date of surgery:  6/19/2020    Arrival Time: Hospital will call you between 5pm and 7pm with your final arrival time for surgery    1. Do not eat or drink anything after  Midnight    prior to surgery. This includes no water, chewing gum, mints or ice chips. 2. Take the following medications with a small sip of water on the morning of Surgery:  Take zoloft dos with sip water   Bring metformen    3. Diabetics may take evening dose of insulin but none after midnight. If you feel symptomatic or low blood sugar morning of surgery drink 1-2 ounces of apple juice only. 4. Aspirin, Ibuprofen, Advil, Naproxen, Vitamin E and other Anti-inflammatory products should be stopped  before surgery  as directed by your physician. Take Tylenol only unless instructed otherwise by your surgeon. 5. Check with your Doctor regarding stopping Plavix, Coumadin, Lovenox, Eliquis, Effient, or other blood thinners. 6. Do not smoke,use illicit drugs and do not drink any alcoholic beverages 24 hours prior to surgery. 7. You may brush your teeth the morning of surgery. DO NOT SWALLOW WATER    8. You MUST make arrangements for a responsible adult to take you home after your surgery. You will not be allowed to leave alone or drive yourself home. It is strongly suggested someone stay with you the first 24 hrs. Your surgery will be cancelled if you do not have a ride home. 9. PEDIATRIC PATIENTS ONLY:  A parent/legal guardian must accompany a child scheduled for surgery and plan to stay at the hospital until the child is discharged. Please do not bring other children with you.     10. Please wear simple, loose fitting clothing to the hospital.  Do not bring valuables (money, credit cards, checkbooks, etc.) Do not

## 2020-06-19 ENCOUNTER — ANESTHESIA (OUTPATIENT)
Dept: ENDOSCOPY | Age: 40
End: 2020-06-19
Payer: MEDICARE

## 2020-06-19 ENCOUNTER — ANESTHESIA EVENT (OUTPATIENT)
Dept: ENDOSCOPY | Age: 40
End: 2020-06-19
Payer: MEDICARE

## 2020-06-19 ENCOUNTER — HOSPITAL ENCOUNTER (OUTPATIENT)
Age: 40
Setting detail: OUTPATIENT SURGERY
Discharge: HOME | End: 2020-06-19
Attending: SURGERY | Admitting: SURGERY
Payer: MEDICARE

## 2020-06-19 ENCOUNTER — TELEPHONE (OUTPATIENT)
Dept: BARIATRICS/WEIGHT MGMT | Age: 40
End: 2020-06-19

## 2020-06-19 VITALS
SYSTOLIC BLOOD PRESSURE: 131 MMHG | DIASTOLIC BLOOD PRESSURE: 66 MMHG | RESPIRATION RATE: 18 BRPM | OXYGEN SATURATION: 99 %

## 2020-06-19 VITALS
HEIGHT: 72 IN | HEART RATE: 86 BPM | RESPIRATION RATE: 18 BRPM | SYSTOLIC BLOOD PRESSURE: 117 MMHG | WEIGHT: 315 LBS | DIASTOLIC BLOOD PRESSURE: 61 MMHG | TEMPERATURE: 98 F | OXYGEN SATURATION: 97 % | BODY MASS INDEX: 42.66 KG/M2

## 2020-06-19 LAB
ALBUMIN SERPL-MCNC: 4.3 G/DL (ref 3.5–5.2)
ALP BLD-CCNC: 93 U/L (ref 40–129)
ALT SERPL-CCNC: 30 U/L (ref 0–40)
ANION GAP SERPL CALCULATED.3IONS-SCNC: 13 MMOL/L (ref 7–16)
AST SERPL-CCNC: 24 U/L (ref 0–39)
BILIRUB SERPL-MCNC: 0.7 MG/DL (ref 0–1.2)
BUN BLDV-MCNC: 10 MG/DL (ref 6–20)
CALCIUM SERPL-MCNC: 9.4 MG/DL (ref 8.6–10.2)
CHLORIDE BLD-SCNC: 98 MMOL/L (ref 98–107)
CHOLESTEROL, TOTAL: 149 MG/DL (ref 0–199)
CO2: 25 MMOL/L (ref 22–29)
CREAT SERPL-MCNC: 0.8 MG/DL (ref 0.7–1.2)
FERRITIN: 399 NG/ML
FOLATE: 15.1 NG/ML (ref 4.8–24.2)
GFR AFRICAN AMERICAN: >60
GFR NON-AFRICAN AMERICAN: >60 ML/MIN/1.73
GLUCOSE BLD-MCNC: 120 MG/DL (ref 74–99)
HBA1C MFR BLD: 6.8 % (ref 4–5.6)
HCT VFR BLD CALC: 40.5 % (ref 37–54)
HEMOGLOBIN: 13 G/DL (ref 12.5–16.5)
MCH RBC QN AUTO: 25.2 PG (ref 26–35)
MCHC RBC AUTO-ENTMCNC: 32.1 % (ref 32–34.5)
MCV RBC AUTO: 78.5 FL (ref 80–99.9)
PDW BLD-RTO: 14.9 FL (ref 11.5–15)
PLATELET # BLD: 272 E9/L (ref 130–450)
PMV BLD AUTO: 9.7 FL (ref 7–12)
POTASSIUM SERPL-SCNC: 4.3 MMOL/L (ref 3.5–5)
RBC # BLD: 5.16 E12/L (ref 3.8–5.8)
SODIUM BLD-SCNC: 136 MMOL/L (ref 132–146)
TOTAL PROTEIN: 8 G/DL (ref 6.4–8.3)
TRIGL SERPL-MCNC: 198 MG/DL (ref 0–149)
VITAMIN B-12: 865 PG/ML (ref 211–946)
VITAMIN D 25-HYDROXY: 7 NG/ML (ref 30–100)
WBC # BLD: 10.5 E9/L (ref 4.5–11.5)

## 2020-06-19 PROCEDURE — 84630 ASSAY OF ZINC: CPT

## 2020-06-19 PROCEDURE — 6360000002 HC RX W HCPCS: Performed by: NURSE ANESTHETIST, CERTIFIED REGISTERED

## 2020-06-19 PROCEDURE — 82746 ASSAY OF FOLIC ACID SERUM: CPT

## 2020-06-19 PROCEDURE — 80053 COMPREHEN METABOLIC PANEL: CPT

## 2020-06-19 PROCEDURE — 82465 ASSAY BLD/SERUM CHOLESTEROL: CPT

## 2020-06-19 PROCEDURE — 3700000000 HC ANESTHESIA ATTENDED CARE: Performed by: SURGERY

## 2020-06-19 PROCEDURE — 7100000010 HC PHASE II RECOVERY - FIRST 15 MIN: Performed by: SURGERY

## 2020-06-19 PROCEDURE — 2580000003 HC RX 258: Performed by: SURGERY

## 2020-06-19 PROCEDURE — 88305 TISSUE EXAM BY PATHOLOGIST: CPT

## 2020-06-19 PROCEDURE — 7100000011 HC PHASE II RECOVERY - ADDTL 15 MIN: Performed by: SURGERY

## 2020-06-19 PROCEDURE — 83036 HEMOGLOBIN GLYCOSYLATED A1C: CPT

## 2020-06-19 PROCEDURE — 2709999900 HC NON-CHARGEABLE SUPPLY: Performed by: SURGERY

## 2020-06-19 PROCEDURE — 82306 VITAMIN D 25 HYDROXY: CPT

## 2020-06-19 PROCEDURE — 85027 COMPLETE CBC AUTOMATED: CPT

## 2020-06-19 PROCEDURE — 84425 ASSAY OF VITAMIN B-1: CPT

## 2020-06-19 PROCEDURE — 82607 VITAMIN B-12: CPT

## 2020-06-19 PROCEDURE — 84478 ASSAY OF TRIGLYCERIDES: CPT

## 2020-06-19 PROCEDURE — 43239 EGD BIOPSY SINGLE/MULTIPLE: CPT | Performed by: SURGERY

## 2020-06-19 PROCEDURE — 82728 ASSAY OF FERRITIN: CPT

## 2020-06-19 PROCEDURE — 36415 COLL VENOUS BLD VENIPUNCTURE: CPT

## 2020-06-19 PROCEDURE — 3609012400 HC EGD TRANSORAL BIOPSY SINGLE/MULTIPLE: Performed by: SURGERY

## 2020-06-19 RX ORDER — PROPOFOL 10 MG/ML
INJECTION, EMULSION INTRAVENOUS PRN
Status: DISCONTINUED | OUTPATIENT
Start: 2020-06-19 | End: 2020-06-19 | Stop reason: SDUPTHER

## 2020-06-19 RX ORDER — SODIUM CHLORIDE, SODIUM LACTATE, POTASSIUM CHLORIDE, CALCIUM CHLORIDE 600; 310; 30; 20 MG/100ML; MG/100ML; MG/100ML; MG/100ML
INJECTION, SOLUTION INTRAVENOUS CONTINUOUS
Status: DISCONTINUED | OUTPATIENT
Start: 2020-06-19 | End: 2020-06-19 | Stop reason: HOSPADM

## 2020-06-19 RX ORDER — SODIUM CHLORIDE 0.9 % (FLUSH) 0.9 %
10 SYRINGE (ML) INJECTION PRN
Status: DISCONTINUED | OUTPATIENT
Start: 2020-06-19 | End: 2020-06-19 | Stop reason: HOSPADM

## 2020-06-19 RX ADMIN — SODIUM CHLORIDE, POTASSIUM CHLORIDE, SODIUM LACTATE AND CALCIUM CHLORIDE: 600; 310; 30; 20 INJECTION, SOLUTION INTRAVENOUS at 09:00

## 2020-06-19 RX ADMIN — SODIUM CHLORIDE, POTASSIUM CHLORIDE, SODIUM LACTATE AND CALCIUM CHLORIDE: 600; 310; 30; 20 INJECTION, SOLUTION INTRAVENOUS at 08:46

## 2020-06-19 RX ADMIN — PROPOFOL 100 MG: 10 INJECTION, EMULSION INTRAVENOUS at 09:03

## 2020-06-19 ASSESSMENT — PAIN SCALES - GENERAL
PAINLEVEL_OUTOF10: 0
PAINLEVEL_OUTOF10: 0

## 2020-06-19 ASSESSMENT — PAIN - FUNCTIONAL ASSESSMENT: PAIN_FUNCTIONAL_ASSESSMENT: 0-10

## 2020-06-19 NOTE — TELEPHONE ENCOUNTER
I called and LM that their results appt with Dr Georgie nSider for 6/24 would be a phone visit and they do not need to come into the office.

## 2020-06-19 NOTE — H&P
General Surgery History and Physical    Patient's Name/Date of Birth: Oly Silvestre / 1980    Date: June 19, 2020     Surgeon: Vickie Don M.D.    PCP: Jacque Russo MD     Chief Complaint: gerd    HPI:   Oly Silvestre is a 41 y.o. male who presents for evaluation of gerd and for preop eval for bariatric surgery. Past Medical History:   Diagnosis Date    Benign essential HTN     Depression     Diabetes mellitus type 2 in obese (HCC)     GERD without esophagitis     Hyperlipemia     Latex allergy     Morbid obesity due to excess calories (HCC)     WILL on CPAP     10       History reviewed. No pertinent surgical history. Current Facility-Administered Medications   Medication Dose Route Frequency Provider Last Rate Last Dose    sodium chloride flush 0.9 % injection 10 mL  10 mL Intravenous PRN Feliciano Olivares MD        lactated ringers infusion   Intravenous Continuous Obdulia Honeycutt MD 75 mL/hr at 06/19/20 0846         Allergies   Allergen Reactions    Latex     Food Other (See Comments)     Walnuts - Mouth gets sores and pt becomes dry.  Seasonal      Takes Claritin        The patient has a family history that is negative for severe cardiovascular or respiratory issues, negative for reaction to anesthesia.     Social History     Socioeconomic History    Marital status: Single     Spouse name: Not on file    Number of children: Not on file    Years of education: Not on file    Highest education level: Not on file   Occupational History    Occupation: Disable   Social Needs    Financial resource strain: Not on file    Food insecurity     Worry: Not on file     Inability: Not on file   [employer] needs     Medical: Not on file     Non-medical: Not on file   Tobacco Use    Smoking status: Never Smoker    Smokeless tobacco: Never Used   Substance and Sexual Activity    Alcohol use: Not Currently     Comment: occasional    Drug use: No    Sexual activity: Never Lifestyle    Physical activity     Days per week: Not on file     Minutes per session: Not on file    Stress: Not on file   Relationships    Social connections     Talks on phone: Not on file     Gets together: Not on file     Attends Jew service: Not on file     Active member of club or organization: Not on file     Attends meetings of clubs or organizations: Not on file     Relationship status: Not on file    Intimate partner violence     Fear of current or ex partner: Not on file     Emotionally abused: Not on file     Physically abused: Not on file     Forced sexual activity: Not on file   Other Topics Concern    Not on file   Social History Narrative    Not on file           Review of Systems  Review of Systems -  General ROS: negative for - chills, fatigue or malaise  ENT ROS: negative for - hearing change, nasal congestion or nasal discharge  Allergy and Immunology ROS: negative for - hives, itchy/watery eyes or nasal congestion  Hematological and Lymphatic ROS: negative for - blood clots, blood transfusions, bruising or fatigue  Endocrine ROS: negative for - malaise/lethargy, mood swings, palpitations or polydipsia/polyuria  Respiratory ROS: negative for - sputum changes, stridor, tachypnea or wheezing  Cardiovascular ROS: negative for - irregular heartbeat, loss of consciousness, murmur or orthopnea  Gastrointestinal ROS: negative for - constipation, diarrhea, gas/bloating, heartburn or hematemesis  Genito-Urinary ROS: negative for -  genital discharge, genital ulcers or hematuria  Musculoskeletal ROS: negative for - gait disturbance, muscle pain or muscular weakness    Physical exam:   BP (!) 141/79   Pulse 109   Temp 97.6 °F (36.4 °C) (Temporal)   Resp 18   Ht 6' (1.829 m)   Wt (!) 364 lb (165.1 kg)   SpO2 97%   BMI 49.37 kg/m²   General appearance:  NAD  Head: NCAT, PERRLA, EOMI, red conjunctiva  Neck: supple, no masses  Lungs: CTAB, equal chest rise bilateral  Heart: Reg

## 2020-06-19 NOTE — OP NOTE
SURGEON: Shadia Willard M.D. PREOPERATIVE DIAGNOSES:  gerd    POSTOPERATIVE DIAGNOSES: 3cm hiatal hernia     OPERATION: [id number] with biopsy    BLOOD LOSS: 0ML    ANESTHESIA: LMAC    COMPLICATIONS: None. OPERATIONS: The patient was placed on the table in left lateral decubitus position and sedated. Bite block was placed. A lubricated scope was easily passed into the upper esophagus which looked normal. The distal esophagus looked normal. The scope was passed into the stomach and retroflexed. There was 3cm hiatal hernia. The scope was passed down toward the pylorus. The antral mucosa all looked nroaml. Biopsy was taken to check for H. pylori. The scope was then passed through the pylorus into the duodenal bulb which looked normal, then around to the distal duodenum which looked normal, and the scope was then withdrawn. The GE junction was at 34 cm from the teeth. The patient tolerated the procedure well.      Physician Signature: Electronically signed by Dr. Agnieszka Saravia

## 2020-06-24 ENCOUNTER — VIRTUAL VISIT (OUTPATIENT)
Dept: BARIATRICS/WEIGHT MGMT | Age: 40
End: 2020-06-24
Payer: MEDICARE

## 2020-06-24 LAB — ZINC: 80.4 UG/DL (ref 60–120)

## 2020-06-24 PROCEDURE — 99443 PR PHYS/QHP TELEPHONE EVALUATION 21-30 MIN: CPT | Performed by: SURGERY

## 2020-06-24 RX ORDER — ERGOCALCIFEROL 1.25 MG/1
50000 CAPSULE ORAL WEEKLY
Qty: 12 CAPSULE | Refills: 1 | Status: ON HOLD
Start: 2020-06-24 | End: 2020-11-17 | Stop reason: HOSPADM

## 2020-06-25 LAB — VITAMIN B1 WHOLE BLOOD: 118 NMOL/L (ref 70–180)

## 2020-07-09 ENCOUNTER — TELEMEDICINE (OUTPATIENT)
Dept: BARIATRICS/WEIGHT MGMT | Age: 40
End: 2020-07-09
Payer: MEDICARE

## 2020-07-09 PROCEDURE — 90834 PSYTX W PT 45 MINUTES: CPT | Performed by: SOCIAL WORKER

## 2020-07-09 NOTE — PROGRESS NOTES
INDIVIDUAL SESSION: EVALUATION/PSYCHOEDUCATION (2nd visit)    Kiarra Machado is a 41 y.o. Single,   male,   Patient identify verified by Name and . This session was provided as a live video telehealth contact using \"My Chart/Haiku/Keiry\" due to  COVID 19 restriction on face to face visits. Pt was informed and understands the following: that this live video telehealth contact is being made in lieu of a face to face meeting due to the COVID-19 pandemic; this contact is considered a telehealth services; the same session fees that apply to face to face services apply to telehealth services; the benefits and risks of engaging in telehealth services; the need for reliable and secure Internet/phone connection; and that this is their responsibility to maintain the privacy and security of their device and location. With this information, the client verbally consents to participate in a live video telehealth session. Patient Consent :   SW discussed role and services including limits to confidentiality, documentation in a single EMR with care team, time-limited services, and potential financial responsibility. Patient expressed understanding and is agreeable to same. PT consented to receiving unencrypted e-mail   yes      Patient's location: home address in [address] [place] [address]  location other address in [facility]     Those attending session : patient      Chief Complaint   Patient presents with    Consultation     2nd visit follow for bariatric surgery          DX:   Encounter Diagnosis   Name Primary?  Binge-eating disorder, moderate Yes          Wt Readings from Last 3 Encounters:   20 (!) 364 lb (165.1 kg)   20 (!) 375 lb (170.1 kg)   20 (!) 382 lb (173.3 kg)         Narrative: PT reported his current weight at 361 pounds. He stated that he has become aware that there are times when he starts thinking about food but is really not physically hungry.   He shared that he was not previously aware of this and is somewhat surprised. He shared feeling angry and lonely can at time trigger cravings for food. He shared a situation in which he \"gave in and ate pizza\" and the resulting feelings of being bloated and disappointed in himself. He shared that as he becomes more aware, he is making \"different choices\" and distracting himself with music and video games. He reported that he also asking himself if indeed he is hungery or \"is this just a cravings. \"       Mental Status Exam: appearance:  appropriately dressed and appropriately groomed, behavior:  normal, attitude:  cooperative, speech:  appropriate, mood:  euthymic, affect:  congruent with mood, thought content:  no evidence of psychosis, thought process:  logical and coherent, orientation:  oriented in all spheres, memory:  recent:  good and remote:  good, insight:  good , judgment:  good  and cognitive:  intact and intelligent    RISK ASSESSMENT    Suicide screen: denies current suicidal ideation, plan and intent    Self Injurious Behavior: denies    Homicide screen: denies current homicidal ideation, plan and intent    TREATMENT PLAN:  Goal: Increase understanding of role of emotional factors contributing to issues with food and obesity and strategies to cope. Interventions in session:  Reviewed \"Why We Eat\" and \"Reality Journal\" and processed pt. insights. Provided psychoeducation regarding cravings, strategies for dealing with cravings,   head hunger vs physical hunger\" and provided handout Identifying and Handling Cravings\". Assignments/Recommendations: Continue follow-up with SW for education further evaluation. Complete entries in [address]. Attend [facility] support group. Follow up with present providers (Dr Liss Melendez) and all scheduled appointment at [facility].     Next steps: Schedule follow up with me for  60MIN in 5 weeks    Bariatric Surgery: Based on the information gathered through the interview process - there is no current evidence of mental health or substance abuse issues that would impact on the patient receiving bariatric surgery.     Patient and/or family/guardian verbalizes understanding of and agreement with treatment recommendations and plan: yes    Start time: 2:16 PM         End time: 3:07 PM    Visit Time: [address]

## 2020-07-09 NOTE — PATIENT INSTRUCTIONS
Assignments/Recommendations: Continue follow-up with SW for education further evaluation. Complete entries in [address]. Attend [facility] support group. Follow up with present providers (Dr Pramod Marsh) and all scheduled appointment at [facility].

## 2020-07-20 ENCOUNTER — TELEPHONE (OUTPATIENT)
Dept: BARIATRICS/WEIGHT MGMT | Age: 40
End: 2020-07-20

## 2020-07-20 ENCOUNTER — INITIAL CONSULT (OUTPATIENT)
Dept: BARIATRICS/WEIGHT MGMT | Age: 40
End: 2020-07-20
Payer: MEDICARE

## 2020-07-20 VITALS — BODY MASS INDEX: 42.66 KG/M2 | WEIGHT: 315 LBS | HEIGHT: 72 IN

## 2020-07-20 PROCEDURE — 99999 PR OFFICE/OUTPT VISIT,PROCEDURE ONLY: CPT | Performed by: DIETITIAN, REGISTERED

## 2020-07-20 NOTE — PROGRESS NOTES
Weight Loss Assessment: Completed by Nutrition Services RD/LD Certified in Adult Weight Management:  Phone Number:  [phone]  Fax Number:   [phone]    Denisse Brenner   7/20/20  Weight Loss Appointment: 3rd Weight Loss Appointment      Wt Readings from Last 3 Encounters:   07/20/20 (!) 368 lb (166.9 kg)   06/19/20 (!) 364 lb (165.1 kg)   05/20/20 (!) 375 lb (170.1 kg)        (!) 368 lb (166.9 kg)  IBW: 164 lbs         % IBW: 224%       % EBWL: 3%           ABW: 215 lbs  % ABW: 171%       BMI: Body mass index is 49.91 kg/m². Patient's 24 Hour Recall:  Breakfast:  Protein Shake  Snack: [place]  Lunch: Salad  Snack: Fuji Apple  Dinner: Chicken and Broccoli  Snack: Pickles  Water Intake: Gallon  Other Beverages: Tea  Exercise: ADL's - Stair's  - 10 times a day. Pt walking the block -  M-W-F. Does the patient feel he/she achieved last week's weight loss goals:Yes     Education:  Rd// Ld enc the following at today's visit for successful post-surgical Weight Maintenance    1. Weigh yourself daily and record it. 2. Keep documented food records daily   3. 220-225 minutes a week of moderate physical activity   4. Just be more active in day to day routine   5. Higher protein intake and a higher fiber intake. Not a high protein or a high fiber diet just a higher intake. Bart Watson addressed the following with the pt:  - Bart Watson enc pt to comply with nutrition recommendations  - Bart Watson enc to go back maintenance of regular physical activity  - Periodic assessment to prevent and treat eating or other psychiatric disorders   - Bart / Walter enc Participation in support group meetings  - Bart Watson enc pt to go back to maintenance of food records and weight monitoring records   - Bart Watson reviewed the importance of adequate sleep and stress management  - Bart Watson reviewed nonfood strategies to cope with emotions and stress  - Bart Watson encouraged pt to practice the following: Mindful eating: Eating slowly:  Focusing on the Care Physician wants the current diet plan changed to the following:_____________________________________________________________________________________________________________________________________________________________________________________________________________. Physician Signature:__________________________ Date:______________  Once signed please fax back to the [facility] [phone]. We thank you for allowing us to participate in your patients care.

## 2020-07-20 NOTE — TELEPHONE ENCOUNTER
Last Dietary Appointment Notes: 7/20/20    Damari Gonzales 100:  Medicare    Surgery Requested by Patient: As of 7/20/20 LSG    Date: 2 Hour Nutrition Class: Once all testing is complete    Rd / Ld reviewed the following with the patient:    Rd / Ld at the [facility] reviewed with the patient that he / she has not completed the following in order to proceed with bariatric surgery:     - Psychological Evaluation - Appt -  NAVEEN Roqueage - 8/12/20   - Cardiac Clearance - Appt 7/28/20  - Nicotine Script Given 7/20/20 // Draw Date - With Vitamin D Recheck // Pt instructed to call 10 day's after to review         results. Pt was around [employer]. - Pt is scheduled for a weight check d/t Covid-19.  - Vitamin D Def - Dr. Abiodun Barton placed pt on Vitamin D 50,0000iu once a week on 6/24/20 pt picked them up on 6/29/20. Lab Recheck: 9/14/20 - Rd Ld gave pt lab script with recheck date and appt  F/U Appt -     Rd / Ld at the [facility] reviewed that he / she is not at his / her pre-surgery goal weight of 354 lbs. Patient currently weighs 368 lbs and must return to the [facility] for a weight check on H&P before the patient can be scheduled for surgery. This has been reviewed with the patient and the patient is in agreement. Rd / Ld reviewed with the patient that he / she must purchase a 3 month supply of supplements before his / her surgery or at the time of his / her H&P appointment and the patient states he / she is going to purchase the 3 month supply of supplements on H&P. Patient is aware that failure to purchase the supplements at this appointment will cancel the patients surgery date. Patient states at this time he / she  does not see a Counselor and or Psychologist and or Psychiatrist at this time. Patient states he / she is  taking the following psychological medication Zoloft. Prescribed by: PCP since 8 to 10 years .  Pt is aware it is his / her responsibility to discuss having his / her extended release medication changed prior to having weight loss surgery due to the medication not being absorbed after weight loss surgery. Pt verbalized understanding of this. Patient states at this time from the time of his / her initial consult here at the [facility] there has been no changes in his / her medical history. Patient is aware failure to disclose information can lead to his / her surgery being cancelled. Patient received a copy of this at the time of his / her final dietary consult.

## 2020-07-23 ENCOUNTER — TELEPHONE (OUTPATIENT)
Dept: INTERNAL MEDICINE | Age: 40
End: 2020-07-23

## 2020-07-23 NOTE — TELEPHONE ENCOUNTER
[facility]    Pre-visit planning call to discuss patient care during COVID-19 pandemic. A. Left message for patient to call office about virtual visits through [employer] regan or Doxy. me link to facilitate patient care continuity. Last office visit date: 3/12/2020  Outstanding labs/imaging: Nicotine blood, Vitamin D   Medication refill needs: 5   MyChart activity: Yes    Patient transport: community transport (remind patient to call to cancel transport) / private vehicle    B. Result of call: Message left     C. COVID-19 screening: Message left with instruction     Advised patient if coming for face to face office visit, to drive or be dropped off at the Emergency room parking entrance and enter thru [facility]; please wear a mask and expect to have temperature taken. Wheelchair and escort to the office will be provided if needed.     Nicolle Landis MD  7/23/20

## 2020-07-27 ENCOUNTER — VIRTUAL VISIT (OUTPATIENT)
Dept: INTERNAL MEDICINE | Age: 40
End: 2020-07-27
Payer: MEDICARE

## 2020-07-27 PROCEDURE — G2012 BRIEF CHECK IN BY MD/QHP: HCPCS | Performed by: INTERNAL MEDICINE

## 2020-07-27 SDOH — ECONOMIC STABILITY: TRANSPORTATION INSECURITY
IN THE PAST 12 MONTHS, HAS THE LACK OF TRANSPORTATION KEPT YOU FROM MEDICAL APPOINTMENTS OR FROM GETTING MEDICATIONS?: YES

## 2020-07-27 SDOH — ECONOMIC STABILITY: TRANSPORTATION INSECURITY
IN THE PAST 12 MONTHS, HAS LACK OF TRANSPORTATION KEPT YOU FROM MEETINGS, WORK, OR FROM GETTING THINGS NEEDED FOR DAILY LIVING?: NO

## 2020-07-27 SDOH — ECONOMIC STABILITY: INCOME INSECURITY: HOW HARD IS IT FOR YOU TO PAY FOR THE VERY BASICS LIKE FOOD, HOUSING, MEDICAL CARE, AND HEATING?: VERY HARD

## 2020-07-27 SDOH — ECONOMIC STABILITY: FOOD INSECURITY: WITHIN THE PAST 12 MONTHS, THE FOOD YOU BOUGHT JUST DIDN'T LAST AND YOU DIDN'T HAVE MONEY TO GET MORE.: SOMETIMES TRUE

## 2020-07-27 SDOH — ECONOMIC STABILITY: FOOD INSECURITY: WITHIN THE PAST 12 MONTHS, YOU WORRIED THAT YOUR FOOD WOULD RUN OUT BEFORE YOU GOT MONEY TO BUY MORE.: SOMETIMES TRUE

## 2020-07-27 ASSESSMENT — PATIENT HEALTH QUESTIONNAIRE - PHQ9
SUM OF ALL RESPONSES TO PHQ9 QUESTIONS 1 & 2: 0
1. LITTLE INTEREST OR PLEASURE IN DOING THINGS: 0
SUM OF ALL RESPONSES TO PHQ QUESTIONS 1-9: 0
2. FEELING DOWN, DEPRESSED OR HOPELESS: 0
SUM OF ALL RESPONSES TO PHQ QUESTIONS 1-9: 0

## 2020-07-27 NOTE — PROGRESS NOTES
[facility]  Internal Medicine Residency Program  ACC Note      SUBJECTIVE:  PMH:  · DM, Metformin 1000mg BD, HbA1C 6.8 (6/19/2020), no microalbuminuria(6/8/2020)  · HTN, Lisinopril 10mg OD  · HLD, not on statin, LDL 91,  (6/8/2020); has not tolerated statin   · Generalized anxiety disorder, Sertraline 50mg OD  · Vitamin D deficiency, Vit D2 [id number] started from 6/24/2020 weekly, Vit D 25 at 7 (6/19/2020)  · WILL, CPAP  · GERD, Hiatal hernia,s/p EGD 6/19/2020 negative for gastritis and H.pylori  · Obesity class III, Weight-loss clinic, BMI 49.91kg/m2; workup ongoing for bariatric surgery    Current status of care: Chronic medical issues under control; Currently following weight loss clinic; possible bariatric surgery in future. CC: had concerns including Check-Up (needs to review meds for upcoming surgery      -901's). HPI:Maurizio Sanchez had a telephone visit. -Weight needs to be 354lbs (375 -> 364 -> 368)  -Gen surgery want to check nicotine level and vitamin D level  -Gen surgery also wanted to double check any medications  -No stress test in the past  -Will follow up with cardiology tomorrow for clearance as well    Review Of Systems:  General: no fevers, chills, weight loss or gain.    Ears/Nose/Throat: no hearing loss, tinnitus, vertigo, nosebleed, nasal congestion, rhinorrhea, sore throat  Respiratory: no cough, pleuritic chest pain, dyspnea, or wheezing  Cardiovascular: no chest pain, angina, dyspnea on exertion, orthopnea, PND, palpitations, or claudication  Gastrointestinal: no nausea, vomiting, heartburn, diarrhea, constipation, abdominal pain, hematochezia or melena  Genitourinary: no urinary urgency, frequency, dysuria, nocturia, hesitancy, or incontinence  Musculoskeletal: no arthritis, arthralgia, myalgia, weakness, or morning stiffness  Skin: no abnormal pigmentation, rash, itching, masses, hair or nail changes    Current Outpatient Medications on File Prior to Visit   Medication Sig Dispense Refill    Multiple Vitamin (MVI, CELEBRATE, CHEWABLE TABLET) Take 1 tablet by mouth daily      vitamin D (ERGOCALCIFEROL) 1.25 MG (47709 UT) CAPS capsule Take 1 capsule by mouth once a week 12 capsule 1    lisinopril (PRINIVIL;ZESTRIL) 10 MG tablet Take 1 tablet by mouth daily 90 tablet 0    blood glucose monitor strips Accucheck meter-pt check BS daily. 100 strip 2    Lancets MISC Accucheck or what insurance covers. 100 each 2    sertraline (ZOLOFT) 50 MG tablet Take 1 tablet by mouth daily 90 tablet 0    metFORMIN (GLUCOPHAGE) 1000 MG tablet Take 1 tablet by mouth 2 times daily (with meals) 180 tablet 0    LORATADINE PO Take 10 mg by mouth as needed      Misc. Devices MISC Give one Accucheck glucometer monitor or whatever the insurance approves. 1 Device 0     No current facility-administered medications on file prior to visit. ASSESSMENT/PLAN:  Ervin Potts was seen today for check-up.     Diagnoses and all orders for this visit:    Morbid obesity with BMI of 50.0-59.9, adult ([address]  -following with weight loss clinic; current BMI 49.91 kg/m2  -lost some weight  -awaiting Vit D level and nicotine level  -to be schedule for bariatric surgery after cardiovascular clearance per patient  -is following with cardio tomorrow    Diabetes mellitus type 2 in obese (HCC)  -Metformin 1000mg BD, HbA1C 6.8 (6/19/2020), no microalbuminuria(6/8/2020)  -Continue    Benign essential HTN  -Lisinopril 10mg OD  -Continue     HLD  not on statin, LDL 91,  (6/8/2020); has not tolerated statin   LDL level is low  Ideally would want to maintain him on some statin  ASCVD risk 4.6%    Generalized anxiety disorder  Sertraline 50mg OD    Vitamin D deficiency  Vit D2 [id number] started from 6/24/2020 weekly, Vit D 25 at 7 (6/19/2020)  Repeat Vit D2 level ordered by surgery  If within normal range now, consider changing to daily D3 dosing    WILL  CPAP    GERD, Hiatal hernia  s/p EGD 6/19/2020 negative for gastritis and H.pylori  Not on PPI    I have reviewed all pertient PMHx, PSHx, FamHx, Social Hx, medications, and allergies and updated history as appropriate.     RTC: 4 weeks    I have reviewed my findings and recommendations with Koki Reed and Dr. Kale Marley MD PGY-2   7/27/2020 12:57 PM

## 2020-07-27 NOTE — PROGRESS NOTES
[facility]  Internal Medicine Clinic    Attending Physician Statement:  Theodora Green M.D., F.A.C.P. I have discussed the case, including pertinent history and exam findings with the resident. I agree with the assessment, plan and orders as documented by the resident. Patient telephone fu visit today. Last office notes reviewed, relative labs and imaging. Health maintenance issues of vaccinations, depression screening, tobacco cessation etc... covered  Cardiac clearance pre op  No cardiac issues, no NARAYAN, chest pain, chf etc.  METS >4  BS fair control 150-250s- aic last month stable  bp prior stable    \"cleared\" low cardiac/medical risk to proceed with surgery  Telephone 5-10min  Remainder of medical problems as per resident note.

## 2020-07-27 NOTE — PATIENT INSTRUCTIONS
- Continue same medication  - Medically clear for surgery per internal medicine  - Patient is scheduled for cardio follow up as well  -  Follow-up in 2 months

## 2020-07-27 NOTE — PROGRESS NOTES
Pt screened positive for SDOH related to financial strain and or food insecurity and declined further contact for assessment/resources. Special Virtual Visit done per Dr. Rosalba Lees.    Patients questions were addressed and answered Printed AVS  was mailed to pt

## 2020-07-28 ENCOUNTER — OFFICE VISIT (OUTPATIENT)
Dept: CARDIOLOGY CLINIC | Age: 40
End: 2020-07-28
Payer: MEDICARE

## 2020-07-28 VITALS
OXYGEN SATURATION: 95 % | WEIGHT: 315 LBS | HEART RATE: 81 BPM | SYSTOLIC BLOOD PRESSURE: 124 MMHG | RESPIRATION RATE: 20 BRPM | HEIGHT: 72 IN | BODY MASS INDEX: 42.66 KG/M2 | DIASTOLIC BLOOD PRESSURE: 64 MMHG

## 2020-07-28 PROCEDURE — 3044F HG A1C LEVEL LT 7.0%: CPT | Performed by: INTERNAL MEDICINE

## 2020-07-28 PROCEDURE — G8417 CALC BMI ABV UP PARAM F/U: HCPCS | Performed by: INTERNAL MEDICINE

## 2020-07-28 PROCEDURE — 2022F DILAT RTA XM EVC RTNOPTHY: CPT | Performed by: INTERNAL MEDICINE

## 2020-07-28 PROCEDURE — 93000 ELECTROCARDIOGRAM COMPLETE: CPT | Performed by: INTERNAL MEDICINE

## 2020-07-28 PROCEDURE — G8427 DOCREV CUR MEDS BY ELIG CLIN: HCPCS | Performed by: INTERNAL MEDICINE

## 2020-07-28 PROCEDURE — 99204 OFFICE O/P NEW MOD 45 MIN: CPT | Performed by: INTERNAL MEDICINE

## 2020-07-28 PROCEDURE — 1036F TOBACCO NON-USER: CPT | Performed by: INTERNAL MEDICINE

## 2020-07-31 ENCOUNTER — TELEPHONE (OUTPATIENT)
Dept: CARDIOLOGY CLINIC | Age: 40
End: 2020-07-31

## 2020-07-31 NOTE — TELEPHONE ENCOUNTER
Left message for for patient with date and time of Nucl Treadmill Stress Test at [facility]. Patient scheduled for 8/10/20 at 7:15am. NPO midnight, no caffeine 24hours and to bring a list of medications with him. A nurse for the nuclear dept will call him 2 days prior to go over things with him. Advised him to call office if he had any questions.

## 2020-08-11 ENCOUNTER — HOSPITAL ENCOUNTER (OUTPATIENT)
Dept: NUCLEAR MEDICINE | Age: 40
Discharge: HOME | End: 2020-08-13
Payer: MEDICARE

## 2020-08-11 ENCOUNTER — HOSPITAL ENCOUNTER (OUTPATIENT)
Dept: NON INVASIVE DIAGNOSTICS | Age: 40
Discharge: HOME | End: 2020-08-11
Payer: MEDICARE

## 2020-08-11 LAB
LV EF: 60 %
LVEF MODALITY: NORMAL

## 2020-08-11 PROCEDURE — 93016 CV STRESS TEST SUPVJ ONLY: CPT | Performed by: INTERNAL MEDICINE

## 2020-08-11 PROCEDURE — 78452 HT MUSCLE IMAGE SPECT MULT: CPT

## 2020-08-11 PROCEDURE — A9500 TC99M SESTAMIBI: HCPCS | Performed by: RADIOLOGY

## 2020-08-11 PROCEDURE — 93018 CV STRESS TEST I&R ONLY: CPT | Performed by: INTERNAL MEDICINE

## 2020-08-11 PROCEDURE — 93017 CV STRESS TEST TRACING ONLY: CPT

## 2020-08-11 PROCEDURE — 3430000000 HC RX DIAGNOSTIC RADIOPHARMACEUTICAL: Performed by: RADIOLOGY

## 2020-08-11 RX ADMIN — Medication 11 MILLICURIE: at 08:28

## 2020-08-11 RX ADMIN — Medication 35 MILLICURIE: at 10:06

## 2020-08-11 NOTE — PROCEDURES
Exercise Treadmill Test Stress Report:    Dx CP    Baseline EKG: normal sinus rhythm, nonspecific ST and T waves changes. Workload: 3:00 minutes to 173 BPM which represents 97% of MPHR. 4.5 METs    Stress EKG: No ST-T changes. Arrhythmias: None. Symptoms: None. Duke Treadmill score is 3. Summary:  Negative ETT to below average workload. Lazo treadmill score is 3 portending a intermediate risk of cardiovascular event. Please see separate report for imaging findings. Mirella Issa D.O.   Cardiologist  Cardiology, [facility]

## 2020-08-12 ENCOUNTER — TELEMEDICINE (OUTPATIENT)
Dept: BARIATRICS/WEIGHT MGMT | Age: 40
End: 2020-08-12
Payer: MEDICARE

## 2020-08-12 PROCEDURE — 90837 PSYTX W PT 60 MINUTES: CPT | Performed by: SOCIAL WORKER

## 2020-08-12 NOTE — PROGRESS NOTES
identify some triggers to urges and cravings. He shared that he struggles to understand emotional eating patterns and had a very hard time identifying most triggers. PT stated that he is concerned about how his emotions will affect his patterns with food and eating going forward but is looking forward to attending  support group and will continue working on the handouts. Mental Status Exam: appearance:  appropriately dressed, behavior:  normal, attitude:  cooperative, speech:  appropriate, mood:  euthymic, affect:  congruent with mood, thought content:  no evidence of psychosis, thought process:  logical and coherent, orientation:  oriented in all spheres, memory:  recent:  good and remote:  good, insight:  fair , judgment:  fair  and cognitive:  intact and intelligent    RISK ASSESSMENT    Suicide screen: denies current suicidal ideation, plan and intent    Self Injurious Behavior: denies    Homicide screen: denies current homicidal ideation, plan and intent    TREATMENT PLAN:  Goal: Increase understanding of role of emotional factors contributing to issues with food and obesity and strategies to cope. Interventions in session:  Reviewed \"Identifying and Handling Cravings\"  and processed pt. insights. Discussed the importance of  attending the support group. Assignments/Recommendations: Continue follow-up with  for education further evaluation. Complete entries in hand-out \"Identifying and Handling Cravings\"    Attend [facility] support group. Follow up with (referrals/present providers/all scheduled appointment at [facility]. Next steps: Schedule follow up with me for  60MIN in 5 weeks and attent the [facility] support group    Bariatric Surgery: Based on the information gathered through the interview process - there is no current evidence of mental health or substance abuse issues that would impact on the patient receiving bariatric surgery.     Patient and/or family/guardian verbalizes understanding of and agreement with treatment recommendations and plan: yes    Start time: 2:24 pm         End time: 3:22 pm      Visit Time: [address], LISMIYA

## 2020-09-03 ENCOUNTER — VIRTUAL VISIT (OUTPATIENT)
Dept: INTERNAL MEDICINE | Age: 40
End: 2020-09-03
Payer: MEDICARE

## 2020-09-03 PROCEDURE — G2012 BRIEF CHECK IN BY MD/QHP: HCPCS | Performed by: INTERNAL MEDICINE

## 2020-09-03 RX ORDER — LISINOPRIL 10 MG/1
10 TABLET ORAL DAILY
Qty: 90 TABLET | Refills: 0 | Status: ON HOLD
Start: 2020-09-03 | End: 2020-11-17 | Stop reason: HOSPADM

## 2020-09-03 RX ORDER — GLUCOSAMINE HCL/CHONDROITIN SU 500-400 MG
CAPSULE ORAL
Qty: 100 STRIP | Refills: 2 | Status: SHIPPED
Start: 2020-09-03 | End: 2021-12-20

## 2020-09-03 RX ORDER — ERGOCALCIFEROL 1.25 MG/1
50000 CAPSULE ORAL WEEKLY
Qty: 12 CAPSULE | Refills: 1 | Status: CANCELLED | OUTPATIENT
Start: 2020-09-03

## 2020-09-03 RX ORDER — LANCETS 30 GAUGE
EACH MISCELLANEOUS
Qty: 100 EACH | Refills: 2 | Status: SHIPPED
Start: 2020-09-03 | End: 2021-12-20

## 2020-09-03 NOTE — PROGRESS NOTES
Special Virtual Visit done per Dr. Praveen Long   Patients questions were addressed and answered Printed AVS along with BS and B/P log sheets  were mailed to pt

## 2020-09-03 NOTE — PROGRESS NOTES
Consent:  The Patient and/or health care decision maker is aware that that he or she may receive a bill for this telephone service, depending on his insurance coverage, and has provided verbal consent to proceed: Yes      Documentation:  I communicated with the patient and/or health care decision maker about multiple co-morbidities. Details of this discussion including any medical advice provided:     Attending Physician Statement  I have discussed the case, including pertinent history and exam findings with the resident. I agree with the documented assessment and plan. This visit was performed via Telemedicine during the [facility]-03 public health crisis. Following with Bariatric surgery    BMI- is 50   Labs reviewed from 6/19- low vit d; stable BUN/Cr and CBC. Repeat Vit D level needed   Goal weight for surgery is 354- 368 on 7/28- per patient on home scale 362   Cardiac Clearance completed per Cardiology    Follow-up with Bariatric Surgery planned    Given clearance from Cardiology- ok to proceed to Intervention at this time     General Anxiety Disorder   Stable on SSRI and tolerating     Vitamin D Deficiency    Previously completed replacement- weekly dosing due to level of 7   Need repeat levels     HTN- stable   Home readings at goal   Continue current management     Type II DM- stable   ON metformin only   Proceed to Bariatric intervention when appropriate    Continue current management       I affirm this is a Patient Initiated Episode with a Patient who has not had a related appointment within my department in the past 7 days or scheduled within the next 24 hours.     Patient identification was verified at the start of the visit: Yes        Patient's location: home address in [facility]  Physician  location other address in [facility] other people involved in call resident/patient           Total Time: minutes: 5-10 minutes

## 2020-09-03 NOTE — PATIENT INSTRUCTIONS
- Continue same medication as prescribed  - Continue to measure your blood glucose and blood pressure at home  - continue taking your vitamin D capsules; to change to daily supplement after the lab vs after the surgery  - We will follow up with you in 3 months

## 2020-09-03 NOTE — PROGRESS NOTES
[facility]  Internal Medicine Residency Program  [facility] Note    Toby Rodriguez is a 41 y.o. male evaluated via telephone on 9/3/2020. Consent:  He and/or health care decision maker is aware that that he may receive a bill for this telephone service, depending on his insurance coverage, and has provided verbal consent to proceed: Yes      Documentation:  I communicated with the patient and/or health care decision maker about his process into getting the bariatric surgery and his medical management of the condition as mentioned below. Details of this discussion including any medical advice provided: as mentioned in the HPI and the assessment and plan portion. I affirm this is a Patient Initiated Episode with a Patient who has not had a related appointment within my department in the past 7 days or scheduled within the next 24 hours. Patient identification was verified at the start of the visit: Yes          SUBJECTIVE:  PMH:  · DM, Metformin 1000mg BD, HbA1C 6.8 (6/19/2020), no microalbuminuria (6/8/2020)  · HTN, Lisinopril 10mg OD  · HLD, not on statin, LDL 91,  (6/8/2020); has not tolerated multiple statin types  · Generalized anxiety disorder, Sertraline 50mg OD  · Vitamin D deficiency, Vit D2 [id number] started from 6/24/2020 weekly, Vit D 25 at 7 (6/19/2020)  · WILL, CPAP  · GERD, Hiatal hernia,s/p EGD 6/19/2020 negative for gastritis and H.pylori  · Obesity class III, Weight-loss clinic, BMI 49.91kg/m2; workup ongoing for bariatric surgery; Weight needs to be 354lbs; EGD done 6/19 neg for H.pylori; s/p exercise stress test found no rever     CC: had concerns including Check-Up (needs refills on medications , review tests and labs  is going to have Bariatric Surgery in the future to be scheduled  -180).     Dionna Wes presented to the [facility] for a routine visit    - blood glucose ranging from 120-180; this morning 145  - Blood pressure 123/77  -Weight needs to be 354lbs (375 -> 364 -> 368 -> 362 )  - Gen surgery want to check nicotine level and vitamin D level -> not done yet; to be done before the next visit  - Cardiology did a exercise stress test which was negative for any reversible perfusion defect and showed EF of 60% without any significant wall motion abnormality  -Patient is medically cleared to proceed with his bariatric surgery when ever possible. Review Of Systems:  General: no fevers, chills, weight loss or gain. Ears/Nose/Throat: no hearing loss, tinnitus, vertigo, nosebleed, nasal congestion, rhinorrhea, sore throat  Respiratory: no cough, pleuritic chest pain, dyspnea, or wheezing  Cardiovascular: no chest pain, angina, dyspnea on exertion, orthopnea, PND, palpitations, or claudication  Gastrointestinal: no nausea, vomiting, heartburn, diarrhea, constipation, abdominal pain, hematochezia or melena  Genitourinary: no urinary urgency, frequency, dysuria, nocturia, hesitancy, or incontinence  Musculoskeletal: no arthritis, arthralgia, myalgia, weakness, or morning stiffness  Skin: no abnormal pigmentation, rash, itching, masses, hair or nail changes    Current Outpatient Medications on File Prior to Visit   Medication Sig Dispense Refill    Multiple Vitamin (MVI, CELEBRATE, CHEWABLE TABLET) Take 1 tablet by mouth daily      vitamin D (ERGOCALCIFEROL) 1.25 MG (19457 UT) CAPS capsule Take 1 capsule by mouth once a week 12 capsule 1    LORATADINE PO Take 10 mg by mouth as needed      Misc. Devices MISC Give one Accucheck glucometer monitor or whatever the insurance approves. 1 Device 0     No current facility-administered medications on file prior to visit. ASSESSMENT/PLAN:    I have reviewed all pertient PMHx, PSHx, FamHx, Social Hx, medications, and allergies and updated history as appropriate.     RTC:    I have reviewed my findings and recommendations with Morenita Urias and Dr. Simón King     Total Time: minutes: 5-10 minutes    Note: not billable if this call serves to triage the patient into an appointment for the relevant concern      Katie Hemphill MD PGY-2   9/3/2020 9:38 AM

## 2020-09-10 ENCOUNTER — TELEPHONE (OUTPATIENT)
Dept: BARIATRICS/WEIGHT MGMT | Age: 40
End: 2020-09-10

## 2020-09-10 NOTE — TELEPHONE ENCOUNTER
PCP has not signed final dietary note for insurance submission. Bart Watson refaxed note. 3rd attempt. Pt notified - LM. Pt instructed to f/u with PCP. [facility] awaiting PCP signature note.

## 2020-09-11 ENCOUNTER — TELEPHONE (OUTPATIENT)
Dept: BARIATRICS/WEIGHT MGMT | Age: 40
End: 2020-09-11

## 2020-09-11 ENCOUNTER — TELEPHONE (OUTPATIENT)
Dept: INTERNAL MEDICINE | Age: 40
End: 2020-09-11

## 2020-09-11 NOTE — TELEPHONE ENCOUNTER
Please call pt he is calling about a form for his bariatric surgery that was faxed in June or July and then faxed again yesterday that needs signed by Dr Anabela Fleming please advise and call

## 2020-09-11 NOTE — TELEPHONE ENCOUNTER
Paperwork completed and faxed 9/3/20. Voicemail message left for patient and weight loss center to notify of same.

## 2020-09-14 ENCOUNTER — HOSPITAL ENCOUNTER (OUTPATIENT)
Age: 40
Discharge: HOME | End: 2020-09-14
Payer: MEDICARE

## 2020-09-14 LAB — VITAMIN D 25-HYDROXY: 21 NG/ML (ref 30–100)

## 2020-09-14 PROCEDURE — 82306 VITAMIN D 25 HYDROXY: CPT

## 2020-09-14 PROCEDURE — G0480 DRUG TEST DEF 1-7 CLASSES: HCPCS

## 2020-09-14 PROCEDURE — 36415 COLL VENOUS BLD VENIPUNCTURE: CPT

## 2020-09-16 ENCOUNTER — VIRTUAL VISIT (OUTPATIENT)
Dept: BARIATRICS/WEIGHT MGMT | Age: 40
End: 2020-09-16
Payer: MEDICARE

## 2020-09-16 PROCEDURE — 99442 PR PHYS/QHP TELEPHONE EVALUATION 11-20 MIN: CPT | Performed by: SURGERY

## 2020-09-16 NOTE — PROGRESS NOTES
Surgery Progress Note            Chief complaint:   Patient Active Problem List   Diagnosis    Recurrent major depressive disorder ([facility]    WILL on CPAP    Diabetes mellitus type 2 in obese (HCC)    Benign essential HTN    Depression    ALVINO (generalized anxiety disorder)    Morbid obesity with BMI of 50.0-59.9, adult ([facility]    Gastroesophageal reflux disease without esophagitis       S: no acute changes    O: There were no vitals filed for this visit. No intake or output data in the 24 hours ending 09/16/20 1515        Labs:  No results for input(s): WBC, HGB, HCT in the last 72 hours. Invalid input(s): PLR  Lab Results   Component Value Date    CREATININE 0.8 06/19/2020    BUN 10 06/19/2020     06/19/2020    K 4.3 06/19/2020    CL 98 06/19/2020    CO2 25 06/19/2020     No results for input(s): LIPASE, AMYLASE in the last 72 hours. Physical exam:   Not done due to phone visit    A:  Vit d deficit     P: cont supplementation as d went from 7 to 100 [facility] MD Pushpa  9/16/2020  Consent:  He and/or health care decision maker is aware that that he may receive a bill for this telephone service, depending on his insurance coverage, and has provided verbal consent to proceed: Yes      Documentation:  I communicated with the patient and/or health care decision maker about d levels . Details of this discussion including any medical advice provided: wt loss surgery      I affirm his is a Patient Initiated Episode with a Patient who has not had a related appointment within my department in the past 7 days or scheduled within the next 24 hours.         Patient's location: home address  Physician  location office address in [facility]  Other people involved in call none          Total Time: minutes: 11-20 minutes

## 2020-09-16 NOTE — PATIENT INSTRUCTIONS
What is the next step to proceed with weight loss surgery? Please be aware that any co-pays or deductibles may be requested prior to testing and / or procedures. You will need to schedule a psychological evaluation for weight loss surgery. Patients will be required to complete all psychological testing as required by the mental health provider. Patients must also follow all of the provider's recommendations before weight loss surgery can be scheduled. The evaluation must be done a standard way for weight loss surgery. We strongly recommend that you contact one of our preferred providers listed below to arrange this:      Adrian Denney, [facility]  [address] [facility], [address] [place] [address]    Dr. Nicole Moore, PhD  [address]   [phone]    Dr. Robel Butt, PhD    Brittney Anton. [place], [place]    [phone]      You will also need to plan on attending a 2 hour nutrition class at the [facility] prior to your surgery. We will schedule this for you when we schedule your surgery. Please remember to have your labs drawn 10 days prior to your first scheduled dietary appointment. Please remember, that while we will submit your case to insurance for surgery authorization, it is your responsibility to know if your plan covers weight loss surgery and keep up-to-date with changes to your insurance coverage. We will do everything possible to help you get approved for weight loss surgery, but cannot guarantee an approval.     Please note that you will not be submitted to your insurance company until all pre-operative testing requirements are met.

## 2020-09-17 ENCOUNTER — TELEMEDICINE (OUTPATIENT)
Dept: BARIATRICS/WEIGHT MGMT | Age: 40
End: 2020-09-17
Payer: MEDICARE

## 2020-09-17 PROCEDURE — 90837 PSYTX W PT 60 MINUTES: CPT | Performed by: SOCIAL WORKER

## 2020-09-17 NOTE — PATIENT INSTRUCTIONS
Assignments/Recommendations: Follow-up with SW as needed. Attend [facility] support group. Follow up with referrals/present providers/all scheduled appointment at [facility].

## 2020-09-17 NOTE — PROGRESS NOTES
INDIVIDUAL SESSION:  [facility] - [facility] CLEARANCE     Srinivasan Payne is a 41 y.o. Single,   male, referred by Primary Care Provider  for evaluation and treatment. Patient identify verified by Name and . This session was provided as a live video telehealth contact using \"My Chart/Haiku/Keiry\" due to  COVID 19 restriction on face to face visits. Pt was informed and understands the following: that this live video telehealth contact is being made in lieu of a face to face meeting due to the COVID-19 pandemic; this contact is considered a telehealth services; the same session fees that apply to face to face services apply to telehealth services; the benefits and risks of engaging in telehealth services; the need for reliable and secure Internet/phone connection; and that this is their responsibility to maintain the privacy and security of their device and location. With this information, the client verbally consents to participate in a live video telehealth session. Patient Consent :   SW discussed role and services including limits to confidentiality, documentation in a single EMR with care team, time-limited services, and potential financial responsibility. Patient expressed understanding and is agreeable to same. yes      Patient's location: home address in [facility]    Provider's  location other address in [facility]       Those attending session : patient    DX:   Encounter Diagnosis   Name Primary?  Binge-eating disorder, moderate Yes       Chief Complaint   Patient presents with    Consultation     4th vist/final clearance         Wt Readings from Last 3 Encounters:   20 (!) 368 lb 12.8 oz (167.3 kg)   20 (!) 368 lb (166.9 kg)   20 (!) 364 lb (165.1 kg)        Narrative: Florjaida Damicopj stated that he has been able to identify things that triggers for urges for food.   He stated that he was surprised by some of them but also recognized that he \"can make a decision about whether or not her wants to eat\" when he has a craving. He was able to identify that he has associated food with other pleasurable activities but in working on \"enjoying those activities without food\". Ash Muniz stated that he does not feel good, physically or emotionally after eating foods high in sugar/simple carbs and that maintaining a healthier diet, spending more time playing guitar, and developing relationship with people is improving his mood in general.  [place] share some concerns about relationships after surgery and the potential for developing dependencies on other behaviors that might be a problem (spending, alcohol. ..)      Mental Status Exam: appearance:  appropriately dressed, behavior:  normal, attitude:  cooperative, speech:  appropriate, mood:  euthymic, affect:  congruent with mood, thought content:  no evidence of psychosis, thought process:  logical and coherent, orientation:  oriented in all spheres, memory:  recent:  good and remote:  good, insight:  good , judgment:  good  and cognitive:  intact and intelligent    RISK ASSESSMENT    Suicide screen: denies current suicidal ideation, plan and intent    Self Injurious Behavior: denies    Homicide screen: denies current homicidal ideation, plan and intent    TREATMENT PLAN:  Goal: Increase understanding of role of emotional factors contributing to issues with food and obesity and strategies to cope. Interventions in session:  Reviewed previous information provided to the patient and reinforced the need for continued engagement in support group and other resources of the [facility]. Provided Psychoeducational regarding physical, emotional, and behavioral changes that might occur for the patient post WLS. Assignments/Recommendations: Follow-up with SW as needed. Attend [facility] support group. Follow up with referrals/present providers/all scheduled appointment at [facility]. Next steps: Follow up with SW post surgery as needed.      Bariatric Surgery: Final visit:

## 2020-09-18 LAB
3-OH-COTININE: <2 NG/ML
COTININE: <2 NG/ML
NICOTINE: <2 NG/ML

## 2020-09-21 ENCOUNTER — TELEPHONE (OUTPATIENT)
Dept: BARIATRICS/WEIGHT MGMT | Age: 40
End: 2020-09-21

## 2020-09-21 NOTE — LETTER
Medical Clearance/Medical Necessity for Laparoscopic Sleeve Gastrectomy    Name: Pritesh Summers     Date of Birth: [birth year]    I have been caring for the above patient for _____ years. He/she suffers from the following medical conditions:  ____________________________________________________________________________________________________________________________________________    The above medical conditions are either caused by or worsened by the patients morbid obesity. Yes_________ No__________      The above medical conditions are currently stable:      Yes_________ No__________    The following medical conditions are difficult to manage/require multiple medications to achieve control due to the patients weight:   ____________________________________________________________________________________________________________________________________________                    The above patient has participated in the following medical weight loss efforts without long-term weight reduction:  ____________________________________________________________________________________________________________________________________________        I am in support of my patient undergoing a weight loss surgical procedure with [facility] and the patient understands the risks and benefits of weight loss surgery. The patient has reasonable expectations and I believe the patient will be compliant with all post-surgical requirements. I understand the program is comprehensive with dedicated and specially trained staff.  In the event that  my patient is over the age of 61, I would like to state that the patients physiological age and co-morbid conditions result in a positive risk to benefit ratio.      _______  In my medical opinion, there are no medical contraindications to proceed with gastric sleeve surgery and patients benefits of bariatric

## 2020-09-21 NOTE — TELEPHONE ENCOUNTER
Per order of Dr. Rufino Rice patient is ready to be scheduled for LSG. Call placed to patient and message left to recall. Patient called in and wants his surgery to be done at 11/02/2020. H&P appointment set. I told him Radha Cortez would call him to schedule 2 hour class. He knows he will need to purchase his supplements at that time. He uses a CPAP and setting is 9-10. He is agreeable to COVID testing. We reviewed at length all meds to avoid 21 weeks prior to surgery and he voiced understanding. He will continue to follow the low fat diet until surgery. I will provide a copy when I mail his pre op letter. Call placed to surgery schedulers and patient placed on [employer] calendar. Chart to Dhara Bravo.

## 2020-09-24 ENCOUNTER — TELEPHONE (OUTPATIENT)
Dept: BARIATRICS/WEIGHT MGMT | Age: 40
End: 2020-09-24

## 2020-09-24 ENCOUNTER — INITIAL CONSULT (OUTPATIENT)
Dept: BARIATRICS/WEIGHT MGMT | Age: 40
End: 2020-09-24
Payer: MEDICARE

## 2020-09-24 PROCEDURE — 99999 PR OFFICE/OUTPT VISIT,PROCEDURE ONLY: CPT | Performed by: DIETITIAN, REGISTERED

## 2020-09-28 ENCOUNTER — PREP FOR PROCEDURE (OUTPATIENT)
Dept: SURGERY | Age: 40
End: 2020-09-28

## 2020-09-28 RX ORDER — SODIUM CHLORIDE, SODIUM LACTATE, POTASSIUM CHLORIDE, CALCIUM CHLORIDE 600; 310; 30; 20 MG/100ML; MG/100ML; MG/100ML; MG/100ML
INJECTION, SOLUTION INTRAVENOUS CONTINUOUS
Status: CANCELLED | OUTPATIENT
Start: 2020-09-28

## 2020-09-28 RX ORDER — SODIUM CHLORIDE 0.9 % (FLUSH) 0.9 %
10 SYRINGE (ML) INJECTION EVERY 12 HOURS SCHEDULED
Status: CANCELLED | OUTPATIENT
Start: 2020-09-28

## 2020-09-28 RX ORDER — SODIUM CHLORIDE 0.9 % (FLUSH) 0.9 %
10 SYRINGE (ML) INJECTION PRN
Status: CANCELLED | OUTPATIENT
Start: 2020-09-28

## 2020-10-06 ENCOUNTER — INITIAL CONSULT (OUTPATIENT)
Dept: BARIATRICS/WEIGHT MGMT | Age: 40
End: 2020-10-06
Payer: MEDICARE

## 2020-10-06 ENCOUNTER — TELEPHONE (OUTPATIENT)
Dept: BARIATRICS/WEIGHT MGMT | Age: 40
End: 2020-10-06

## 2020-10-06 VITALS — BODY MASS INDEX: 42.66 KG/M2 | WEIGHT: 315 LBS | HEIGHT: 72 IN

## 2020-10-06 PROCEDURE — 99999 PR OFFICE/OUTPT VISIT,PROCEDURE ONLY: CPT | Performed by: DIETITIAN, REGISTERED

## 2020-10-06 NOTE — PATIENT INSTRUCTIONS
The [address] [facility]  Dietary Follow-up Appointment Instructions    Myron Rojas   Date: 10/6/2020     The RD / LD reviewed the following instructions with the patient and handouts have been given. Pt. is able to verbalize instruction and has been instructed to call with any problems or complications. If patient is not able to comply with the dietary or supplement instructions given pt. is instructed to call the [facility] at [phone]. Patient has been instructed to continue to follow a low-fat diet from today's date until the day before surgery. Patient has been instructed to drink 64 oz. of water daily eliminating carbonated and caffeinated beverages. The day before surgery patient will need to follow the bowel prep instructions (See Handout in Booklet)      ___________________________________________________________________________________________________________    Bariatric Bowel Prep Instructions      A bowel prep is necessary prior to all lower scopes and weight loss surgery. What you will need to purchase:  1) One 4.1 oz. bottle of Raissa LAX, available over the counter  2) 32 oz. of Gatorade - do NOT use red or purple varieties    The bowel prep will begin 24 hours prior to your surgery. The Day Before Surgery: You may have only clear liquids (see below)    Beginning around 1:00 pm, mix the entire bottle of Raissa LAX in 32 oz. of Gatorade. Drink 8 oz. of the mixture every 10 minutes until finished. You may continue clear liquids until 8 hours before your procedure. Make sure to also drink a minimum of 64 oz. of plain water today in addition to the prep and other clear liquids to prevent dehydration. If you are on a fluid restriction contact the office staff in advance. A Clear Liquid Diet is Suggested to Be:    Decaffeinated black coffee, decaffeinated plain tea, Gingerale, 7-Up, Silverio-Aid (no purple or red dyes).  You can also use sugar free products such as Diet Gingerale, Diet 7 - Up, Sugar Free Silverio-Aid. Plain gelatin, popsicles (again no purple or red), apple juice. You can also use diet gelatin and sugar free popsicles. Clear broth, bouillon and consommé - Chicken, beef or vegetable broth. Sugar, sugar substitutes, honey, and salt can be added    If you should have any questions regarding this prep or your procedure, you can call  the office at .

## 2020-10-06 NOTE — PROGRESS NOTES
[facility]  Nutrition History and Physical     Jamas Marlon    Surgery Type: As of 10/6/20 - LSG  Today's Date: 10/6/20    yes  Patient will attend a 2 hour LSG nutrition education class on 10/6/20, and was given written educational material.  Patient signed and verbalized understanding of nutrition therapy for Bariatric Surgery. Assessment:              Vitals:    10/06/20 1017   Weight: (!) 356 lb (161.5 kg)   Height: 6' (1.829 m)    Height: 6' (1.829 m) Weight: (!) 356 lb (161.5 kg)   IBW: 164 lbs %EBWL: 9%     BMI:Body mass index is 48.28 kg/m². Food Allergies and Allergies: Yes - Walnuts   Food Intolerances: No   Eating Problems:No  Chewing Problems:No  Swallowing Problems:No    Current Vitamins/Supplements and Medications:  Current Outpatient Medications:     lisinopril (PRINIVIL;ZESTRIL) 10 MG tablet, Take 1 tablet by mouth daily, Disp: 90 tablet, Rfl: 0    blood glucose monitor strips, Accucheck meter-pt check BS daily. , Disp: 100 strip, Rfl: 2    Lancets MISC, Accucheck or what insurance covers. , Disp: 100 each, Rfl: 2    sertraline (ZOLOFT) 50 MG tablet, Take 1 tablet by mouth daily, Disp: 90 tablet, Rfl: 0    metFORMIN (GLUCOPHAGE) 1000 MG tablet, Take 1 tablet by mouth 2 times daily (with meals), Disp: 180 tablet, Rfl: 0    Multiple Vitamin (MVI, CELEBRATE, CHEWABLE TABLET), Take 1 tablet by mouth daily, Disp: , Rfl:     vitamin D (ERGOCALCIFEROL) 1.25 MG (31367 UT) CAPS capsule, Take 1 capsule by mouth once a week, Disp: 12 capsule, Rfl: 1    Misc. Devices MISC, Give one Accucheck glucometer monitor or whatever the insurance approves. , Disp: 1 Device, Rfl: 0    LORATADINE PO, Take 10 mg by mouth as needed, Disp: , Rfl:   _    Please check all that apply:  Yes - Patient lost 10% of excess body weight prior to surgery  Yes - Patient  is able to verbalize a Bariatric Full Liquid Diet.   Yes - Patient is able to verbalize the usage & importance of Protein Supplements. Yes - Patient purchased 3 month supply of protein vitamins and calcium. YES - Patient is instructed to follow a low-fat diet from now until surgery date. YES - Patient is instructed to take 30 grams of a protein supplement from  now until surgery date in addition to low-fat diet guidelines. YES - Patient is instructed to consume 64 ounces of water daily from now until surgery date.  ________________________________________________________________________  Yes - Patient did  lose 10% of excess body weight prior to surgery  ________________________________________________________________________  Yes - Patient did purchase 3 month supply of protein, vitamins and Calcium.     Comments:   [facility] Supplements

## 2020-10-06 NOTE — TELEPHONE ENCOUNTER
Pt was in the office today for an H&P appt. Pt states he wants to know what date he has up il before he can cx his sx? Pts Uncle has Guardianship over him and is his only caregiver and states he may not be able to bring him to sx or pick him up from sx d/t having to have an appearance in court. Pt  Is trying to not cx. Pt is aware he has up until the Wed. before sx to cx.   Pt is aware he can not take a  taxi home from the hospital. Pt verbalized understanding

## 2020-10-07 ENCOUNTER — VIRTUAL VISIT (OUTPATIENT)
Dept: BARIATRICS/WEIGHT MGMT | Age: 40
End: 2020-10-07
Payer: MEDICARE

## 2020-10-07 PROCEDURE — 99999 PR OFFICE/OUTPT VISIT,PROCEDURE ONLY: CPT | Performed by: DIETITIAN, REGISTERED

## 2020-10-07 NOTE — LETTER
[facility]   [address]  Phone: [phone]  Fax: [phone]    Cecilia Guzman RD, MYRIAM        October 7, 2020    [address] [place] [address]      Dear Akila Suarez:        I personally wanted to thank you for selecting The [address] [place] [address] and [facility] as your center of choice for surgery. I wanted to take the time to let you know it means a lot to me to be able to work with you both before and after surgery and I am so glad that you will become part of our surgical family. It has been a privilege to get to know you and become part of your new journey on life. I look forward to working with you in the future and helping you achieve your new goals. I can't wait to see the growth and transformation that occurs for you after your surgery. If at any time you have any questions you can always contact me [phone].      Respectfully,          Cecilia Guzman RDN/ MYRIAM  Bariatric Dietitian  [address] [place] [address] and [facility]  Certified In Adult Weight Management I and II  Certified In Pediatric and Adolescent Weight Management      Cecilia Guzman RD, MYRIAM

## 2020-10-07 NOTE — PROGRESS NOTES
Patient attended a 2 Hour Initial Nutrition Consult Class for LSG on 10/7/20. Patient was able to verbalize understanding of the class.

## 2020-10-15 ENCOUNTER — TELEPHONE (OUTPATIENT)
Dept: BARIATRICS/WEIGHT MGMT | Age: 40
End: 2020-10-15

## 2020-10-15 NOTE — TELEPHONE ENCOUNTER
Pt called stating that he needed to change is 11/02/2020 surgery date. He states that his uncle has a court hearing that day and is the only person who would be able to take him. He has also tried rescheduling the hearing but he is unable to do so. I advised pt to leave a message on Dania's vm and she would be calling him back.

## 2020-10-19 ENCOUNTER — TELEPHONE (OUTPATIENT)
Dept: BARIATRICS/WEIGHT MGMT | Age: 40
End: 2020-10-19

## 2020-10-19 NOTE — TELEPHONE ENCOUNTER
Patient called in and needed to have his surgery date changed. He had no reliable caregiver to be with him. He would like his surgery to be on 11/16/2020. Call placed to surgery schedulers and patient placed on google calendar.

## 2020-10-21 ENCOUNTER — OFFICE VISIT (OUTPATIENT)
Dept: BARIATRICS/WEIGHT MGMT | Age: 40
End: 2020-10-21
Payer: MEDICARE

## 2020-10-21 VITALS
HEART RATE: 96 BPM | SYSTOLIC BLOOD PRESSURE: 146 MMHG | TEMPERATURE: 97.2 F | BODY MASS INDEX: 42.66 KG/M2 | WEIGHT: 315 LBS | HEIGHT: 72 IN | OXYGEN SATURATION: 96 % | DIASTOLIC BLOOD PRESSURE: 83 MMHG

## 2020-10-21 PROCEDURE — G8427 DOCREV CUR MEDS BY ELIG CLIN: HCPCS | Performed by: SURGERY

## 2020-10-21 PROCEDURE — G8417 CALC BMI ABV UP PARAM F/U: HCPCS | Performed by: SURGERY

## 2020-10-21 PROCEDURE — 99214 OFFICE O/P EST MOD 30 MIN: CPT | Performed by: SURGERY

## 2020-10-21 PROCEDURE — G8484 FLU IMMUNIZE NO ADMIN: HCPCS | Performed by: SURGERY

## 2020-10-21 PROCEDURE — 1036F TOBACCO NON-USER: CPT | Performed by: SURGERY

## 2020-10-21 PROCEDURE — 99213 OFFICE O/P EST LOW 20 MIN: CPT

## 2020-10-21 RX ORDER — ONDANSETRON 4 MG/1
4 TABLET, ORALLY DISINTEGRATING ORAL EVERY 8 HOURS PRN
Qty: 10 TABLET | Refills: 0 | Status: SHIPPED
Start: 2020-10-21 | End: 2020-12-15 | Stop reason: SDUPTHER

## 2020-10-21 RX ORDER — OMEPRAZOLE 20 MG/1
20 CAPSULE, DELAYED RELEASE ORAL DAILY
Qty: 30 CAPSULE | Refills: 12 | Status: SHIPPED
Start: 2020-10-21 | End: 2021-11-17

## 2020-10-21 NOTE — PROGRESS NOTES
Brian Singh  10/21/2020  [facility]              History and Physical  Sleeve Gastrectomy with hiatal hernia repair               CHIEF COMPLAINT: Morbid obesity Type 2 Diabetes Mellitus, Hypertension, Hyperlipidemia, Obstructive Sleep Apnea treated with BiPAP/CPAP, GERD and Depression    HISTORY OF PRESENT ILLNESS: Brian Singh is a morbidly-obese 41 y.o.  male, who weighs (!) 362 lb (164.2 kg). He is 198 pounds over his ideal body weight. The Body mass index is 49.1 kg/m². He has lost 11 pounds over the past several months in preparation for surgery. He has multiple medical problems aggravated by his obesity. He wishes to have a laparoscopic sleeve gastrectomy so that he can lose more weight and keep the weight off. I have met with him on 2 different occasions in the [facility], where we discussed the surgery in great detail and went over the risks and benefits as well as the fact that sleeve patient's lose less weight than bypass patients. He has watched our informational video and understands all of the extensive risks involved. He states that he understands all of these risks and wishes to proceed.     Past Medical History  Past Medical History:   Diagnosis Date    Benign essential HTN     Depression     Diabetes mellitus type 2 in obese (HCC)     GERD without esophagitis     Hyperlipemia     Latex allergy     Morbid obesity due to excess calories (HCC)     WILL on CPAP     10      Past Surgical History  Past Surgical History:   Procedure Laterality Date    UPPER GASTROINTESTINAL ENDOSCOPY N/A 6/19/2020    EGD BIOPSY performed by Geronimo Carranza MD at [facility] ENDOSCOPY      Allergies: Latex; Food; and Seasonal     Medications:  Current Outpatient Medications   Medication Sig Dispense Refill    ondansetron (ZOFRAN-ODT) 4 MG disintegrating tablet Take 1 tablet by mouth every 8 hours as needed for Nausea or Vomiting 10 tablet 0    omeprazole (PRILOSEC) 20 MG delayed release capsule Take 1 capsule by mouth daily 30 capsule 12    lisinopril (PRINIVIL;ZESTRIL) 10 MG tablet Take 1 tablet by mouth daily 90 tablet 0    blood glucose monitor strips Accucheck meter-pt check BS daily. 100 strip 2    Lancets MISC Accucheck or what insurance covers. 100 each 2    sertraline (ZOLOFT) 50 MG tablet Take 1 tablet by mouth daily 90 tablet 0    metFORMIN (GLUCOPHAGE) 1000 MG tablet Take 1 tablet by mouth 2 times daily (with meals) 180 tablet 0    Multiple Vitamin (MVI, CELEBRATE, CHEWABLE TABLET) Take 1 tablet by mouth daily      vitamin D (ERGOCALCIFEROL) 1.25 MG (20249 UT) CAPS capsule Take 1 capsule by mouth once a week 12 capsule 1    Misc. Devices MISC Give one Accucheck glucometer monitor or whatever the insurance approves. 1 Device 0    LORATADINE PO Take 10 mg by mouth as needed       No current facility-administered medications for this visit.        Social History     Tobacco Use    Smoking status: Never Smoker    Smokeless tobacco: Never Used   Substance Use Topics    Alcohol use: Not Currently      Review of Systems    Review of Systems - General ROS: negative for - chills, fatigue or malaise  ENT ROS: negative for - hearing change, nasal congestion or nasal discharge  Allergy and Immunology ROS: negative for - hives, itchy/watery eyes or nasal congestion  Hematological and Lymphatic ROS: negative for - blood clots, blood transfusions, bruising or fatigue  Endocrine ROS: negative for - malaise/lethargy, mood swings, palpitations or polydipsia/polyuria  Breast ROS: negative for - new or changing breast lumps or nipple changes  Respiratory ROS: negative for - sputum changes, stridor, tachypnea or wheezing  Cardiovascular ROS: negative for - irregular heartbeat, loss of consciousness, murmur or orthopnea  Gastrointestinal ROS: negative for - constipation, diarrhea, gas/bloating, heartburn or hematemesis  Genito-Urinary ROS: negative for - erectile dysfunction, genital discharge, genital ulcers or hematuria  Musculoskeletal ROS: negative for - gait disturbance, muscle pain or muscular weakness      Physical Exam:   BP (!) 146/83   Pulse 96   Temp 97.2 °F (36.2 °C) (Infrared)   Ht 6' (1.829 m)   Wt (!) 362 lb (164.2 kg)   SpO2 96%   BMI 49.10 kg/m²     General appearance: alert, appears stated age and cooperative  Pyscho/social: negative for tremors and hallucinations  Head: Normocephalic, without obvious abnormality, atraumatic  Neck: no adenopathy, no carotid bruit, no JVD, supple, symmetrical, trachea midline and thyroid not enlarged, symmetric, no tenderness/mass/nodules  Lungs: clear to auscultation bilaterally  Heart: regular rate and rhythm, S1, S2 normal, no murmur, click, rub or gallop  Abdomen: soft, non-tender; bowel sounds normal; no masses,  no organomegaly  Extremities: extremities normal, atraumatic, no cyanosis or edema    Assessment:  Morbid obesity with failure of conservative therapy. Patient has been cleared psychologically and medically. The gall bladder ultrasound was normal. Upper endoscopy showed 3 hiatal hernia. The patient was informed that risks include, but are not limited to: death, anastomotic leak, obstruction, bleeding, and sepsis. Any of these could require further surgery. Other risks include heart attack, DVT, PE, pneumonia, hernia, wound infection, the need for dilatations of the sleeve, and the inability to lose appropriate weight and keep it off. We discussed that our goal is to ameliorate the medical problems and not to obtain a specific body mass index. He understands the risks and benefits and wishes to proceed with the procedure. He has signed a consent form. He will go home with norco prn for pain, omeprazole 20 mg daily for ulcer prophylaxis, Zofran ODT 4 mg and scopolamine patch for nausea postoperatively. Plan: Laparoscopic Sleeve Gastrectomy possible hiatal hernia repair, possible robot assistance.     Physician Signature: Electronically signed by Dr. Beverley Abreu MD    Send copy of H&P to PCP, Parth Treviño MD

## 2020-11-09 ENCOUNTER — TELEPHONE (OUTPATIENT)
Dept: CARDIOLOGY CLINIC | Age: 40
End: 2020-11-09

## 2020-11-09 ENCOUNTER — HOSPITAL ENCOUNTER (OUTPATIENT)
Dept: PREADMISSION TESTING | Age: 40
Discharge: HOME | End: 2020-11-09
Payer: MEDICARE

## 2020-11-09 ENCOUNTER — HOSPITAL ENCOUNTER (OUTPATIENT)
Age: 40
Discharge: HOME | End: 2020-11-11
Payer: MEDICARE

## 2020-11-09 VITALS
HEIGHT: 72 IN | DIASTOLIC BLOOD PRESSURE: 57 MMHG | WEIGHT: 315 LBS | RESPIRATION RATE: 18 BRPM | TEMPERATURE: 97.8 F | SYSTOLIC BLOOD PRESSURE: 118 MMHG | BODY MASS INDEX: 42.66 KG/M2 | OXYGEN SATURATION: 99 % | HEART RATE: 74 BPM

## 2020-11-09 LAB
ALBUMIN SERPL-MCNC: 3.7 G/DL (ref 3.5–5.2)
ALP BLD-CCNC: 88 U/L (ref 40–129)
ALT SERPL-CCNC: 21 U/L (ref 0–40)
ANION GAP SERPL CALCULATED.3IONS-SCNC: 9 MMOL/L (ref 7–16)
AST SERPL-CCNC: 15 U/L (ref 0–39)
BILIRUB SERPL-MCNC: 0.4 MG/DL (ref 0–1.2)
BUN BLDV-MCNC: 9 MG/DL (ref 6–20)
CALCIUM SERPL-MCNC: 9.3 MG/DL (ref 8.6–10.2)
CHLORIDE BLD-SCNC: 102 MMOL/L (ref 98–107)
CO2: 27 MMOL/L (ref 22–29)
CREAT SERPL-MCNC: 0.8 MG/DL (ref 0.7–1.2)
GFR AFRICAN AMERICAN: >60
GFR NON-AFRICAN AMERICAN: >60 ML/MIN/1.73
GLUCOSE BLD-MCNC: 112 MG/DL (ref 74–99)
HCT VFR BLD CALC: 35.7 % (ref 37–54)
HEMOGLOBIN: 11.6 G/DL (ref 12.5–16.5)
MCH RBC QN AUTO: 26 PG (ref 26–35)
MCHC RBC AUTO-ENTMCNC: 32.5 % (ref 32–34.5)
MCV RBC AUTO: 79.9 FL (ref 80–99.9)
PDW BLD-RTO: 15.1 FL (ref 11.5–15)
PLATELET # BLD: 233 E9/L (ref 130–450)
PMV BLD AUTO: 10.2 FL (ref 7–12)
POTASSIUM SERPL-SCNC: 4.2 MMOL/L (ref 3.5–5)
RBC # BLD: 4.47 E12/L (ref 3.8–5.8)
SODIUM BLD-SCNC: 138 MMOL/L (ref 132–146)
TOTAL PROTEIN: 7.5 G/DL (ref 6.4–8.3)
WBC # BLD: 10.1 E9/L (ref 4.5–11.5)

## 2020-11-09 PROCEDURE — 80053 COMPREHEN METABOLIC PANEL: CPT

## 2020-11-09 PROCEDURE — U0003 INFECTIOUS AGENT DETECTION BY NUCLEIC ACID (DNA OR RNA); SEVERE ACUTE RESPIRATORY SYNDROME CORONAVIRUS 2 (SARS-COV-2) (CORONAVIRUS DISEASE [COVID-19]), AMPLIFIED PROBE TECHNIQUE, MAKING USE OF HIGH THROUGHPUT TECHNOLOGIES AS DESCRIBED BY CMS-2020-01-R: HCPCS

## 2020-11-09 PROCEDURE — 85027 COMPLETE CBC AUTOMATED: CPT

## 2020-11-09 PROCEDURE — 36415 COLL VENOUS BLD VENIPUNCTURE: CPT

## 2020-11-09 NOTE — PROGRESS NOTES
[facility]                                                                                                                    PRE OP INSTRUCTIONS FOR  Akshat Wright        Date: 11/9/2020    Date of surgery: 11/16/20   Arrival Time: Hospital will call you Friday  between 5pm and 7pm with your final arrival time for surgery    1. Do not eat or drink anything after midnight prior to surgery. This includes no water, chewing gum, mints or ice chips. 2. Take the following medications with a small sip of water on the morning of Surgery: if needed allergy pill     3. Diabetics may take evening dose of insulin but none after midnight. If you feel symptomatic or low blood sugar morning of surgery drink 1-2 ounces of apple juice only. 4. Aspirin, Ibuprofen, Advil, Naproxen, Vitamin E and other Anti-inflammatory products should be stopped  before surgery  as directed by your physician. Take Tylenol only unless instructed otherwise by your surgeon. 5. Check with your Doctor regarding stopping Plavix, Coumadin, Lovenox, Eliquis, Effient, or other blood thinners. 6. Do not smoke,use illicit drugs and do not drink any alcoholic beverages 24 hours prior to surgery. 7. You may brush your teeth the morning of surgery. DO NOT SWALLOW WATER    8. You MUST make arrangements for a responsible adult to take you home after your surgery. You will not be allowed to leave alone or drive yourself home. It is strongly suggested someone stay with you the first 24 hrs. Your surgery will be cancelled if you do not have a ride home. 9. PEDIATRIC PATIENTS ONLY:  A parent/legal guardian must accompany a child scheduled for surgery and plan to stay at the hospital until the child is discharged. Please do not bring other children with you.     10. Please wear simple, loose fitting clothing to the hospital.  Do not bring valuables (money, credit cards, checkbooks, etc.) Do not wear any makeup (including no eye makeup) or nail polish on your fingers or toes. 11. DO NOT wear any jewelry or piercings on day of surgery. All body piercing jewelry must be removed. 12. Shower the night before surgery with _x__Antibacterial soap /ZHANNA WIPES________    13. TOTAL JOINT REPLACEMENT/HYSTERECTOMY PATIENTS ONLY---Remember to bring Blood Bank bracelet to the hospital on the day of surgery. 14. If you have a Living Will and Durable Power of  for Healthcare, please bring in a copy. 15. If appropriate bring crutches, inspirex, WALKER, CANE etc... 12. Notify your Surgeon if you develop any illness between now and surgery time, cough, cold, fever, sore throat, nausea, vomiting, etc.  Please notify your surgeon if you experience dizziness, shortness of breath or blurred vision between now & the time of your surgery. 17. If you have ___dentures, they will be removed before going to the OR; we will provide you a container. If you wear ___contact lenses or _x__glasses, they will be removed; please bring a case for them. 18. To provide excellent care visitors will be limited to 1 in the room at any given time. 19. Please bring picture ID and insurance card. 20. Sleep apnea patients need to bring CPAP AND SETTINGS to hospital on day of surgery. 21. During flu season no children under the age of 15 are permitted in the hospital for the safety of all patients. 22. Other Please check in at the information desk/main lobby. Wear a mask. Please call AMBULATORY CARE if you have any further questions.    [facility]     [address]

## 2020-11-10 LAB
SARS-COV-2: NOT DETECTED
SOURCE: NORMAL

## 2020-11-16 ENCOUNTER — HOSPITAL ENCOUNTER (INPATIENT)
Age: 40
LOS: 1 days | Discharge: HOME | DRG: 164 | End: 2020-11-17
Attending: SURGERY | Admitting: SURGERY
Payer: MEDICARE

## 2020-11-16 ENCOUNTER — ANESTHESIA (OUTPATIENT)
Dept: OPERATING ROOM | Age: 40
DRG: 164 | End: 2020-11-16
Payer: MEDICARE

## 2020-11-16 ENCOUNTER — ANESTHESIA EVENT (OUTPATIENT)
Dept: OPERATING ROOM | Age: 40
DRG: 164 | End: 2020-11-16
Payer: MEDICARE

## 2020-11-16 VITALS
DIASTOLIC BLOOD PRESSURE: 63 MMHG | TEMPERATURE: 96.8 F | RESPIRATION RATE: 2 BRPM | OXYGEN SATURATION: 90 % | SYSTOLIC BLOOD PRESSURE: 128 MMHG

## 2020-11-16 PROBLEM — Z98.84 S/P LAPAROSCOPIC SLEEVE GASTRECTOMY: Status: ACTIVE | Noted: 2020-11-16

## 2020-11-16 LAB
METER GLUCOSE: 174 MG/DL (ref 74–99)
METER GLUCOSE: 214 MG/DL (ref 74–99)
METER GLUCOSE: 99 MG/DL (ref 74–99)

## 2020-11-16 PROCEDURE — 0DJ08ZZ INSPECTION OF UPPER INTESTINAL TRACT, VIA NATURAL OR ARTIFICIAL OPENING ENDOSCOPIC: ICD-10-PCS | Performed by: SURGERY

## 2020-11-16 PROCEDURE — 2580000003 HC RX 258: Performed by: SURGERY

## 2020-11-16 PROCEDURE — 82962 GLUCOSE BLOOD TEST: CPT

## 2020-11-16 PROCEDURE — 6360000002 HC RX W HCPCS: Performed by: ANESTHESIOLOGY

## 2020-11-16 PROCEDURE — 2720000010 HC SURG SUPPLY STERILE: Performed by: SURGERY

## 2020-11-16 PROCEDURE — 2500000003 HC RX 250 WO HCPCS: Performed by: SURGERY

## 2020-11-16 PROCEDURE — 0KXG0ZZ TRANSFER LEFT TRUNK MUSCLE, OPEN APPROACH: ICD-10-PCS | Performed by: SURGERY

## 2020-11-16 PROCEDURE — 6360000002 HC RX W HCPCS

## 2020-11-16 PROCEDURE — 0BQT4ZZ REPAIR DIAPHRAGM, PERCUTANEOUS ENDOSCOPIC APPROACH: ICD-10-PCS | Performed by: SURGERY

## 2020-11-16 PROCEDURE — 6360000002 HC RX W HCPCS: Performed by: NURSE ANESTHETIST, CERTIFIED REGISTERED

## 2020-11-16 PROCEDURE — 99024 POSTOP FOLLOW-UP VISIT: CPT | Performed by: SURGERY

## 2020-11-16 PROCEDURE — 3600000003 HC SURGERY LEVEL 3 BASE: Performed by: SURGERY

## 2020-11-16 PROCEDURE — 3600000013 HC SURGERY LEVEL 3 ADDTL 15MIN: Performed by: SURGERY

## 2020-11-16 PROCEDURE — 3700000001 HC ADD 15 MINUTES (ANESTHESIA): Performed by: SURGERY

## 2020-11-16 PROCEDURE — 6360000002 HC RX W HCPCS: Performed by: SURGERY

## 2020-11-16 PROCEDURE — 43775 LAP SLEEVE GASTRECTOMY: CPT | Performed by: SURGERY

## 2020-11-16 PROCEDURE — 0DB64Z3 EXCISION OF STOMACH, PERCUTANEOUS ENDOSCOPIC APPROACH, VERTICAL: ICD-10-PCS | Performed by: SURGERY

## 2020-11-16 PROCEDURE — 6370000000 HC RX 637 (ALT 250 FOR IP)

## 2020-11-16 PROCEDURE — 88307 TISSUE EXAM BY PATHOLOGIST: CPT

## 2020-11-16 PROCEDURE — 7100000000 HC PACU RECOVERY - FIRST 15 MIN: Performed by: SURGERY

## 2020-11-16 PROCEDURE — 2500000003 HC RX 250 WO HCPCS: Performed by: NURSE ANESTHETIST, CERTIFIED REGISTERED

## 2020-11-16 PROCEDURE — 15734 MUSCLE-SKIN GRAFT TRUNK: CPT | Performed by: SURGERY

## 2020-11-16 PROCEDURE — 0KXH0ZZ TRANSFER RIGHT THORAX MUSCLE, OPEN APPROACH: ICD-10-PCS | Performed by: SURGERY

## 2020-11-16 PROCEDURE — 1200000000 HC SEMI PRIVATE

## 2020-11-16 PROCEDURE — 3700000000 HC ANESTHESIA ATTENDED CARE: Performed by: SURGERY

## 2020-11-16 PROCEDURE — 43281 LAP PARAESOPHAG HERN REPAIR: CPT | Performed by: SURGERY

## 2020-11-16 PROCEDURE — 7100000001 HC PACU RECOVERY - ADDTL 15 MIN: Performed by: SURGERY

## 2020-11-16 PROCEDURE — 2709999900 HC NON-CHARGEABLE SUPPLY: Performed by: SURGERY

## 2020-11-16 RX ORDER — SCOLOPAMINE TRANSDERMAL SYSTEM 1 MG/1
PATCH, EXTENDED RELEASE TRANSDERMAL
Status: DISCONTINUED
Start: 2020-11-16 | End: 2020-11-17 | Stop reason: HOSPADM

## 2020-11-16 RX ORDER — SODIUM CHLORIDE 0.9 % (FLUSH) 0.9 %
10 SYRINGE (ML) INJECTION PRN
Status: DISCONTINUED | OUTPATIENT
Start: 2020-11-16 | End: 2020-11-16 | Stop reason: HOSPADM

## 2020-11-16 RX ORDER — ONDANSETRON 2 MG/ML
INJECTION INTRAMUSCULAR; INTRAVENOUS PRN
Status: DISCONTINUED | OUTPATIENT
Start: 2020-11-16 | End: 2020-11-16 | Stop reason: SDUPTHER

## 2020-11-16 RX ORDER — PROMETHAZINE HYDROCHLORIDE 25 MG/ML
INJECTION, SOLUTION INTRAMUSCULAR; INTRAVENOUS
Status: COMPLETED
Start: 2020-11-16 | End: 2020-11-16

## 2020-11-16 RX ORDER — SCOLOPAMINE TRANSDERMAL SYSTEM 1 MG/1
1 PATCH, EXTENDED RELEASE TRANSDERMAL
Status: DISCONTINUED | OUTPATIENT
Start: 2020-11-16 | End: 2020-11-16 | Stop reason: SDUPTHER

## 2020-11-16 RX ORDER — SCOLOPAMINE TRANSDERMAL SYSTEM 1 MG/1
1 PATCH, EXTENDED RELEASE TRANSDERMAL
Status: DISCONTINUED | OUTPATIENT
Start: 2020-11-19 | End: 2020-11-17 | Stop reason: HOSPADM

## 2020-11-16 RX ORDER — LIDOCAINE HYDROCHLORIDE 20 MG/ML
INJECTION, SOLUTION INTRAVENOUS PRN
Status: DISCONTINUED | OUTPATIENT
Start: 2020-11-16 | End: 2020-11-16 | Stop reason: SDUPTHER

## 2020-11-16 RX ORDER — DEXAMETHASONE SODIUM PHOSPHATE 10 MG/ML
INJECTION, SOLUTION INTRAMUSCULAR; INTRAVENOUS PRN
Status: DISCONTINUED | OUTPATIENT
Start: 2020-11-16 | End: 2020-11-16 | Stop reason: SDUPTHER

## 2020-11-16 RX ORDER — MORPHINE SULFATE 4 MG/ML
4 INJECTION, SOLUTION INTRAMUSCULAR; INTRAVENOUS
Status: DISCONTINUED | OUTPATIENT
Start: 2020-11-16 | End: 2020-11-17 | Stop reason: HOSPADM

## 2020-11-16 RX ORDER — SODIUM CHLORIDE 0.9 % (FLUSH) 0.9 %
10 SYRINGE (ML) INJECTION PRN
Status: DISCONTINUED | OUTPATIENT
Start: 2020-11-16 | End: 2020-11-17 | Stop reason: HOSPADM

## 2020-11-16 RX ORDER — SODIUM CHLORIDE, SODIUM LACTATE, POTASSIUM CHLORIDE, CALCIUM CHLORIDE 600; 310; 30; 20 MG/100ML; MG/100ML; MG/100ML; MG/100ML
INJECTION, SOLUTION INTRAVENOUS CONTINUOUS
Status: DISCONTINUED | OUTPATIENT
Start: 2020-11-16 | End: 2020-11-17 | Stop reason: HOSPADM

## 2020-11-16 RX ORDER — SUCCINYLCHOLINE/SOD CL,ISO/PF 200MG/10ML
SYRINGE (ML) INTRAVENOUS PRN
Status: DISCONTINUED | OUTPATIENT
Start: 2020-11-16 | End: 2020-11-16 | Stop reason: SDUPTHER

## 2020-11-16 RX ORDER — FENTANYL CITRATE 50 UG/ML
INJECTION, SOLUTION INTRAMUSCULAR; INTRAVENOUS PRN
Status: DISCONTINUED | OUTPATIENT
Start: 2020-11-16 | End: 2020-11-16 | Stop reason: SDUPTHER

## 2020-11-16 RX ORDER — NEOSTIGMINE METHYLSULFATE 1 MG/ML
INJECTION, SOLUTION INTRAVENOUS PRN
Status: DISCONTINUED | OUTPATIENT
Start: 2020-11-16 | End: 2020-11-16 | Stop reason: SDUPTHER

## 2020-11-16 RX ORDER — PROMETHAZINE HYDROCHLORIDE 25 MG/ML
6.25 INJECTION, SOLUTION INTRAMUSCULAR; INTRAVENOUS
Status: COMPLETED | OUTPATIENT
Start: 2020-11-16 | End: 2020-11-16

## 2020-11-16 RX ORDER — PHENYLEPHRINE HYDROCHLORIDE 10 MG/ML
INJECTION INTRAVENOUS PRN
Status: DISCONTINUED | OUTPATIENT
Start: 2020-11-16 | End: 2020-11-16 | Stop reason: SDUPTHER

## 2020-11-16 RX ORDER — BUPIVACAINE HYDROCHLORIDE AND EPINEPHRINE 2.5; 5 MG/ML; UG/ML
INJECTION, SOLUTION EPIDURAL; INFILTRATION; INTRACAUDAL; PERINEURAL PRN
Status: DISCONTINUED | OUTPATIENT
Start: 2020-11-16 | End: 2020-11-16 | Stop reason: ALTCHOICE

## 2020-11-16 RX ORDER — MORPHINE SULFATE 2 MG/ML
2 INJECTION, SOLUTION INTRAMUSCULAR; INTRAVENOUS
Status: DISCONTINUED | OUTPATIENT
Start: 2020-11-16 | End: 2020-11-17 | Stop reason: HOSPADM

## 2020-11-16 RX ORDER — GLYCOPYRROLATE 1 MG/5 ML
SYRINGE (ML) INTRAVENOUS PRN
Status: DISCONTINUED | OUTPATIENT
Start: 2020-11-16 | End: 2020-11-16 | Stop reason: SDUPTHER

## 2020-11-16 RX ORDER — PROMETHAZINE HYDROCHLORIDE 25 MG/ML
6.25 INJECTION, SOLUTION INTRAMUSCULAR; INTRAVENOUS ONCE
Status: COMPLETED | OUTPATIENT
Start: 2020-11-16 | End: 2020-11-16

## 2020-11-16 RX ORDER — ONDANSETRON 2 MG/ML
4 INJECTION INTRAMUSCULAR; INTRAVENOUS EVERY 6 HOURS PRN
Status: DISCONTINUED | OUTPATIENT
Start: 2020-11-16 | End: 2020-11-17 | Stop reason: HOSPADM

## 2020-11-16 RX ORDER — MIDAZOLAM HYDROCHLORIDE 1 MG/ML
INJECTION INTRAMUSCULAR; INTRAVENOUS PRN
Status: DISCONTINUED | OUTPATIENT
Start: 2020-11-16 | End: 2020-11-16 | Stop reason: SDUPTHER

## 2020-11-16 RX ORDER — PROPOFOL 10 MG/ML
INJECTION, EMULSION INTRAVENOUS PRN
Status: DISCONTINUED | OUTPATIENT
Start: 2020-11-16 | End: 2020-11-16 | Stop reason: SDUPTHER

## 2020-11-16 RX ORDER — ONDANSETRON 2 MG/ML
INJECTION INTRAMUSCULAR; INTRAVENOUS
Status: COMPLETED
Start: 2020-11-16 | End: 2020-11-16

## 2020-11-16 RX ORDER — ROCURONIUM BROMIDE 10 MG/ML
INJECTION, SOLUTION INTRAVENOUS PRN
Status: DISCONTINUED | OUTPATIENT
Start: 2020-11-16 | End: 2020-11-16 | Stop reason: SDUPTHER

## 2020-11-16 RX ORDER — SODIUM CHLORIDE, SODIUM LACTATE, POTASSIUM CHLORIDE, CALCIUM CHLORIDE 600; 310; 30; 20 MG/100ML; MG/100ML; MG/100ML; MG/100ML
INJECTION, SOLUTION INTRAVENOUS CONTINUOUS
Status: DISCONTINUED | OUTPATIENT
Start: 2020-11-16 | End: 2020-11-16

## 2020-11-16 RX ORDER — ONDANSETRON 2 MG/ML
4 INJECTION INTRAMUSCULAR; INTRAVENOUS
Status: COMPLETED | OUTPATIENT
Start: 2020-11-16 | End: 2020-11-16

## 2020-11-16 RX ORDER — PROMETHAZINE HYDROCHLORIDE 25 MG/1
25 SUPPOSITORY RECTAL EVERY 6 HOURS PRN
Status: DISCONTINUED | OUTPATIENT
Start: 2020-11-16 | End: 2020-11-17 | Stop reason: HOSPADM

## 2020-11-16 RX ORDER — SODIUM CHLORIDE 0.9 % (FLUSH) 0.9 %
10 SYRINGE (ML) INJECTION EVERY 12 HOURS SCHEDULED
Status: DISCONTINUED | OUTPATIENT
Start: 2020-11-16 | End: 2020-11-17 | Stop reason: HOSPADM

## 2020-11-16 RX ORDER — SODIUM CHLORIDE 0.9 % (FLUSH) 0.9 %
10 SYRINGE (ML) INJECTION EVERY 12 HOURS SCHEDULED
Status: DISCONTINUED | OUTPATIENT
Start: 2020-11-16 | End: 2020-11-16 | Stop reason: HOSPADM

## 2020-11-16 RX ORDER — MEPERIDINE HYDROCHLORIDE 25 MG/ML
12.5 INJECTION INTRAMUSCULAR; INTRAVENOUS; SUBCUTANEOUS EVERY 5 MIN PRN
Status: DISCONTINUED | OUTPATIENT
Start: 2020-11-16 | End: 2020-11-16 | Stop reason: HOSPADM

## 2020-11-16 RX ADMIN — PROMETHAZINE HYDROCHLORIDE 6.25 MG: 25 INJECTION INTRAMUSCULAR; INTRAVENOUS at 09:59

## 2020-11-16 RX ADMIN — FENTANYL CITRATE 150 MCG: 50 INJECTION, SOLUTION INTRAMUSCULAR; INTRAVENOUS at 07:51

## 2020-11-16 RX ADMIN — ONDANSETRON 4 MG: 2 INJECTION INTRAMUSCULAR; INTRAVENOUS at 09:23

## 2020-11-16 RX ADMIN — ROCURONIUM BROMIDE 20 MG: 10 SOLUTION INTRAVENOUS at 08:37

## 2020-11-16 RX ADMIN — SODIUM CHLORIDE, POTASSIUM CHLORIDE, SODIUM LACTATE AND CALCIUM CHLORIDE: 600; 310; 30; 20 INJECTION, SOLUTION INTRAVENOUS at 07:08

## 2020-11-16 RX ADMIN — SODIUM CHLORIDE, POTASSIUM CHLORIDE, SODIUM LACTATE AND CALCIUM CHLORIDE: 600; 310; 30; 20 INJECTION, SOLUTION INTRAVENOUS at 19:10

## 2020-11-16 RX ADMIN — Medication 0.25 MG: at 09:43

## 2020-11-16 RX ADMIN — PHENYLEPHRINE HYDROCHLORIDE 200 MCG: 10 INJECTION INTRAVENOUS at 08:28

## 2020-11-16 RX ADMIN — ONDANSETRON 4 MG: 2 INJECTION INTRAMUSCULAR; INTRAVENOUS at 08:59

## 2020-11-16 RX ADMIN — PHENYLEPHRINE HYDROCHLORIDE 200 MCG: 10 INJECTION INTRAVENOUS at 08:25

## 2020-11-16 RX ADMIN — FENTANYL CITRATE 50 MCG: 50 INJECTION, SOLUTION INTRAMUSCULAR; INTRAVENOUS at 08:44

## 2020-11-16 RX ADMIN — Medication 10 ML: at 11:05

## 2020-11-16 RX ADMIN — SODIUM CHLORIDE, POTASSIUM CHLORIDE, SODIUM LACTATE AND CALCIUM CHLORIDE: 600; 310; 30; 20 INJECTION, SOLUTION INTRAVENOUS at 08:51

## 2020-11-16 RX ADMIN — PHENYLEPHRINE HYDROCHLORIDE 100 MCG: 10 INJECTION INTRAVENOUS at 08:48

## 2020-11-16 RX ADMIN — LIDOCAINE HYDROCHLORIDE 60 MG: 20 INJECTION, SOLUTION INTRAVENOUS at 07:51

## 2020-11-16 RX ADMIN — PROMETHAZINE HYDROCHLORIDE 6.25 MG: 25 INJECTION, SOLUTION INTRAMUSCULAR; INTRAVENOUS at 09:33

## 2020-11-16 RX ADMIN — SODIUM CHLORIDE, POTASSIUM CHLORIDE, SODIUM LACTATE AND CALCIUM CHLORIDE: 600; 310; 30; 20 INJECTION, SOLUTION INTRAVENOUS at 11:03

## 2020-11-16 RX ADMIN — Medication 3 MG: at 09:00

## 2020-11-16 RX ADMIN — HYDROMORPHONE HYDROCHLORIDE 0.25 MG: 1 INJECTION, SOLUTION INTRAMUSCULAR; INTRAVENOUS; SUBCUTANEOUS at 09:43

## 2020-11-16 RX ADMIN — ROCURONIUM BROMIDE 10 MG: 10 SOLUTION INTRAVENOUS at 08:12

## 2020-11-16 RX ADMIN — Medication 0.6 MG: at 09:00

## 2020-11-16 RX ADMIN — Medication 140 MG: at 07:51

## 2020-11-16 RX ADMIN — PROPOFOL 200 MG: 10 INJECTION, EMULSION INTRAVENOUS at 07:51

## 2020-11-16 RX ADMIN — Medication 10 ML: at 21:45

## 2020-11-16 RX ADMIN — MIDAZOLAM 2 MG: 1 INJECTION INTRAMUSCULAR; INTRAVENOUS at 07:45

## 2020-11-16 RX ADMIN — PHENYLEPHRINE HYDROCHLORIDE 100 MCG: 10 INJECTION INTRAVENOUS at 08:13

## 2020-11-16 RX ADMIN — ROCURONIUM BROMIDE 35 MG: 10 SOLUTION INTRAVENOUS at 08:02

## 2020-11-16 RX ADMIN — PHENYLEPHRINE HYDROCHLORIDE 200 MCG: 10 INJECTION INTRAVENOUS at 08:18

## 2020-11-16 RX ADMIN — DEXAMETHASONE SODIUM PHOSPHATE 10 MG: 10 INJECTION, SOLUTION INTRAMUSCULAR; INTRAVENOUS at 08:02

## 2020-11-16 RX ADMIN — ROCURONIUM BROMIDE 5 MG: 10 SOLUTION INTRAVENOUS at 07:51

## 2020-11-16 RX ADMIN — FENTANYL CITRATE 50 MCG: 50 INJECTION, SOLUTION INTRAMUSCULAR; INTRAVENOUS at 09:09

## 2020-11-16 RX ADMIN — CEFAZOLIN 3 G: 10 INJECTION, POWDER, FOR SOLUTION INTRAVENOUS at 07:45

## 2020-11-16 ASSESSMENT — PULMONARY FUNCTION TESTS
PIF_VALUE: 27
PIF_VALUE: 1
PIF_VALUE: 29
PIF_VALUE: 29
PIF_VALUE: 30
PIF_VALUE: 29
PIF_VALUE: 29
PIF_VALUE: 26
PIF_VALUE: 5
PIF_VALUE: 29
PIF_VALUE: 29
PIF_VALUE: 28
PIF_VALUE: 28
PIF_VALUE: 27
PIF_VALUE: 24
PIF_VALUE: 0
PIF_VALUE: 24
PIF_VALUE: 2
PIF_VALUE: 27
PIF_VALUE: 28
PIF_VALUE: 23
PIF_VALUE: 30
PIF_VALUE: 2
PIF_VALUE: 29
PIF_VALUE: 27
PIF_VALUE: 28
PIF_VALUE: 29
PIF_VALUE: 29
PIF_VALUE: 28
PIF_VALUE: 28
PIF_VALUE: 1
PIF_VALUE: 23
PIF_VALUE: 28
PIF_VALUE: 28
PIF_VALUE: 27
PIF_VALUE: 2
PIF_VALUE: 26
PIF_VALUE: 27
PIF_VALUE: 1
PIF_VALUE: 27
PIF_VALUE: 31
PIF_VALUE: 28
PIF_VALUE: 25
PIF_VALUE: 23
PIF_VALUE: 28
PIF_VALUE: 28
PIF_VALUE: 29
PIF_VALUE: 2
PIF_VALUE: 1
PIF_VALUE: 2
PIF_VALUE: 28
PIF_VALUE: 19
PIF_VALUE: 2
PIF_VALUE: 27
PIF_VALUE: 29
PIF_VALUE: 28
PIF_VALUE: 29
PIF_VALUE: 29
PIF_VALUE: 28
PIF_VALUE: 29
PIF_VALUE: 30
PIF_VALUE: 27
PIF_VALUE: 27
PIF_VALUE: 29
PIF_VALUE: 28
PIF_VALUE: 25
PIF_VALUE: 32
PIF_VALUE: 28
PIF_VALUE: 29
PIF_VALUE: 28
PIF_VALUE: 5
PIF_VALUE: 28
PIF_VALUE: 29
PIF_VALUE: 27
PIF_VALUE: 29
PIF_VALUE: 13
PIF_VALUE: 29
PIF_VALUE: 29
PIF_VALUE: 28
PIF_VALUE: 22
PIF_VALUE: 27
PIF_VALUE: 0
PIF_VALUE: 28
PIF_VALUE: 29
PIF_VALUE: 31

## 2020-11-16 ASSESSMENT — PAIN DESCRIPTION - DESCRIPTORS
DESCRIPTORS: ACHING
DESCRIPTORS: ACHING
DESCRIPTORS: ACHING;DISCOMFORT
DESCRIPTORS: DISCOMFORT

## 2020-11-16 ASSESSMENT — PAIN - FUNCTIONAL ASSESSMENT: PAIN_FUNCTIONAL_ASSESSMENT: 0-10

## 2020-11-16 ASSESSMENT — PAIN DESCRIPTION - ONSET
ONSET: ON-GOING
ONSET: ON-GOING

## 2020-11-16 ASSESSMENT — PAIN DESCRIPTION - ORIENTATION
ORIENTATION: RIGHT;LEFT;LOWER
ORIENTATION: RIGHT;LEFT
ORIENTATION: RIGHT;LEFT
ORIENTATION: RIGHT;LEFT;MID

## 2020-11-16 ASSESSMENT — PAIN DESCRIPTION - FREQUENCY
FREQUENCY: INTERMITTENT
FREQUENCY: INTERMITTENT
FREQUENCY: CONTINUOUS

## 2020-11-16 ASSESSMENT — PAIN SCALES - GENERAL
PAINLEVEL_OUTOF10: 0
PAINLEVEL_OUTOF10: 1
PAINLEVEL_OUTOF10: 2
PAINLEVEL_OUTOF10: 5
PAINLEVEL_OUTOF10: 3

## 2020-11-16 ASSESSMENT — PAIN DESCRIPTION - LOCATION
LOCATION: SHOULDER
LOCATION: ABDOMEN
LOCATION: ABDOMEN
LOCATION: SHOULDER

## 2020-11-16 ASSESSMENT — PAIN DESCRIPTION - PROGRESSION
CLINICAL_PROGRESSION: GRADUALLY IMPROVING
CLINICAL_PROGRESSION: GRADUALLY WORSENING
CLINICAL_PROGRESSION: GRADUALLY IMPROVING
CLINICAL_PROGRESSION: GRADUALLY WORSENING
CLINICAL_PROGRESSION: GRADUALLY IMPROVING

## 2020-11-16 ASSESSMENT — PAIN DESCRIPTION - PAIN TYPE
TYPE: SURGICAL PAIN
TYPE: ACUTE PAIN;SURGICAL PAIN
TYPE: ACUTE PAIN;SURGICAL PAIN

## 2020-11-16 NOTE — H&P
hiatal hernia repair, possible robot assistance.     Physician Signature: Electronically signed by Dr. Clarissa Fields MD

## 2020-11-16 NOTE — CONSULTS
Department of Internal Medicine  Internal Medicine Consultation Note    Primary Care Physician: Jovana Breen MD   Admitting Physician:  Clarissa Fields MD  Admission date and time: 11/16/2020  6:40 AM    Room:  [facility]  Admitting diagnosis: S/P laparoscopic sleeve gastrectomy [Z98.84]    Patient Name: Ritu Chaidez  MRN: [id number]    Date of Service: 11/16/2020     Reason for consultation:  Medical management    History of present illness:    Chris Marques is a very polite and pleasant 68-year-old gentleman who was evaluated on the third floor after undergoing elective sleeve gastrectomy with midline hiatal hernia repair. He is resting comfortably during my examination and describes very minimal postoperative abdominal pain. We discussed his past medical history at length. Also discussed the need for early ambulation and use of the incentive spirometer. He voiced understanding and agreement. PAST MEDICAL Hx:  Past Medical History:   Diagnosis Date    Benign essential HTN     Depression     Diabetes mellitus type 2 in obese (HCC)     GERD without esophagitis     Hyperlipemia     Latex allergy     Morbid obesity due to excess calories (HCC)     WILL on CPAP     10    Sleep apnea     setting 9-10       PAST SURGICAL Hx:   Past Surgical History:   Procedure Laterality Date    UPPER GASTROINTESTINAL ENDOSCOPY N/A 6/19/2020    EGD BIOPSY performed by Clarissa Fields MD at [address] [facility] Hx:  Family History   Problem Relation Age of Onset    Uterine Cancer Mother     Thyroid Disease Mother     Diabetes Mother     Tuberculosis Father     Heart Attack Maternal Grandfather     Heart Attack Other     Thyroid Disease Brother     No Known Problems Brother        HOME MEDICATIONS:  Prior to Admission medications    Medication Sig Start Date End Date Taking?  Authorizing Provider   ondansetron (ZOFRAN-ODT) 4 MG disintegrating tablet Take 1 tablet by mouth every 8 hours as needed for Nausea or Vomiting 10/21/20   Geronimo Carranza MD   omeprazole (PRILOSEC) 20 MG delayed release capsule Take 1 capsule by mouth daily 10/21/20 10/21/21  Geronimo Carranza MD   lisinopril (PRINIVIL;ZESTRIL) 10 MG tablet Take 1 tablet by mouth daily 9/3/20   Ирина Wang MD   blood glucose monitor strips Accucheck meter-pt check BS daily. 9/3/20   Ирина Wang MD   Lancets MISC Accucheck or what insurance covers. 9/3/20   Ирина Wang MD   sertraline (ZOLOFT) 50 MG tablet Take 1 tablet by mouth daily  Patient taking differently: Take 50 mg by mouth nightly  9/3/20   Ирина Wang MD   metFORMIN (GLUCOPHAGE) 1000 MG tablet Take 1 tablet by mouth 2 times daily (with meals) 9/3/20   Ирина Wang MD   Multiple Vitamin (MVI, CELEBRATE, CHEWABLE TABLET) Take 1 tablet by mouth daily    Historical Provider, MD   vitamin D (ERGOCALCIFEROL) 1.25 MG (70157 UT) CAPS capsule Take 1 capsule by mouth once a week 6/24/20   Geronimo Carranza MD   Misc. Devices MISC Give one Accucheck glucometer monitor or whatever the insurance approves.  9/1/17   Margarita Alonzo MD   LORATADINE PO Take 10 mg by mouth as needed    Historical Provider, MD       ALLERGIES:  Latex; Food; and Seasonal    SOCIAL Hx:  Social History     Socioeconomic History    Marital status: Single     Spouse name: Not on file    Number of children: Not on file    Years of education: Not on file    Highest education level: Not on file   Occupational History    Occupation: Disable   Social Needs    Financial resource strain: Very hard    Food insecurity     Worry: Sometimes true     Inability: Sometimes true    Transportation needs     Medical: Yes     Non-medical: No   Tobacco Use    Smoking status: Never Smoker    Smokeless tobacco: Never Used   Substance and Sexual Activity    Alcohol use: Not Currently    Drug use: Never    Sexual activity: Not on file   Lifestyle    Physical activity     Days per week: Not on file     Minutes per session: Not on file    Stress: Not on file   Relationships    Social connections     Talks on phone: Not on file     Gets together: Not on file     Attends Jain service: Not on file     Active member of club or organization: Not on file     Attends meetings of clubs or organizations: Not on file     Relationship status: Not on file    Intimate partner violence     Fear of current or ex partner: Not on file     Emotionally abused: Not on file     Physically abused: Not on file     Forced sexual activity: Not on file   Other Topics Concern    Not on file   Social History Narrative    Drinks 2 L iced tea daily. ROS:  General:   Denies chills, fatigue, fever, malaise, night sweats or weight loss    Psychological:   Denies anxiety, disorientation or hallucinations    ENT:    Denies epistaxis, headaches, vertigo or visual changes. Admits to irritation associated with the nasal cannula oxygen. Cardiovascular:   Denies any chest pain, irregular heartbeats, or palpitations. No paroxysmal nocturnal dyspnea. Respiratory:   Admits to very mild shortness of breath. No coughing or sputum production. Gastrointestinal:   Admits to very mild postoperative abdominal discomfort. No nausea. Genito-Urinary:    Denies any urgency, frequency, hematuria. Voiding without difficulty. Musculoskeletal:   Denies joint pain, joint stiffness, joint swelling or muscle pain    Neurology:    Denies any headache or focal neurological deficits. No weakness or paresthesia. Derm:    Denies any rashes, ulcers, or excoriations. Denies bruising. Extremities:   Denies any lower extremity swelling or edema.       PHYSICAL EXAM:  VITALS:  Vitals:    11/16/20 1035   BP: (!) 181/89   Pulse: 82   Resp: 18   Temp: 98.5 °F (36.9 °C)   SpO2:          CONSTITUTIONAL:    Awake, alert, cooperative, no apparent distress, and appears stated age    EYES:    PERRL, EOMI, sclera clear without icterus, conjunctiva normal    ENT:    Normocephalic,

## 2020-11-16 NOTE — OP NOTE
[address] [facility]20    Operative Report  DATE OF PROCEDURE: 11/16/2020  SURGEON: Dr. Clarissa Sinclair: [place]: Morbid obesity, Hypertension and Hyperlipidemia  POSTOPERATIVE DIAGNOSES: Same   OPERATION:   1) Laparoscopic Sleeve Gastrectomy 17913  2) Midline hiatal hernia repair  3) myocutaneous flap of trunk  ANESTHESIA: General endotracheal.   ESTIMATED BLOOD LOSS: 10 mL. COMPLICATIONS: None. HISTORY: Ritu Chaidez is a morbidly-obese 41 y.o.  male, who weighs 360 pounds. The Body mass index is 48.82 kg/m². He has multiple medical problems aggravated by his obesity. He wishes to have a sleeve gastrectomy so that he can lose more weight and keep the weight off. He understands the extensive risks involved in the surgery and wishes to proceed. PROCEDURE: The patient was placed on the table in the supine position and placed under general endotracheal anesthesia. He had SCDs on the legs as DVT prophylaxis. Ancef 2 g was given IV pre-op. An orogastric tube was placed in the stomach, then removed. The abdomen was shaved, then prepped with DuraPrep and draped in the usual sterile fashion. 0.25% Marcaine plus epinephrine was injected into the skin and muscle of each incision to help with postoperative pain control. A 5-mm incision was made in the left midabdomen. The varies needle was used to insufflated with CO2. The 5 mm trocar and long 5 mm diameter, 45 degree angled scope was used. A 15-mm trocar was placed in the right lateral abdomen, a 5-mm trocar in the left lateral mid abdomen and a 5 mm trochar in the right lateral subcostal region. The liver was not distended. The liver was small. Sandra Zuly liver retractor was placed below the xiphoid. Liver was retracted. The 4cm hiatal hernia defect could be seen.  The dissection was carried out with the hook cautery,  the peritoneum overlying the right jael was incised, and the plane along the hernia sac developed. The dissection extended anteriorly and along the left jael. The base of the crural confluence was dissected free of adhesions to the sac. The hernia sac was carefully dissected into the mediastinum with caudal traction. The interfaces between the pleura, pericardium, spine, and aorta were developed as the dissection was carried cephalad to the top of the hernia sac. The sac contents were completely reduced back into the abdominal cavity. A 1/4 inch penrose drain was placed around the esophagus for traction. The hernia sac was then excised taking care to avoid injury to stomach, esophagus and vagal trunks. The esophagus was dissected circumferentially into the mediastinum in order to reduce tension, allowing the gastroesophageal junction to rest comfortably 4 cm within the abdominal cavity. The hernia was repaired posteriorly with a 6 inch V lock permanent running suture closing the hole in the jael starting inferiorly and sewing toward the esophagus. The repair looked good, not too tight. The myocutaneous flap of the falciform ligament was mobilized with cautery and placed behind the esophagus to buttress the hiatal repair. The Harmonic scalpel was used to remove the omental attachments and short gastric vessels on the greater curvature of the stomach, starting at the lower border and working all the way up around to the angle of His and then proceeding distally to approximately 2 cm from the pylorus. There were adhesions posteriorly, and these were also taken down. A 36 F bougie was placed by anaesthesia down to the duodenum and left in place along the lesser curvature. The Ethicon MICHELLE 60 black cartridge was fired, starting approximately 3 cm from the pylorus, staying wide away from the angularis.   The next stapler was again a black load, then two greeen cartridges and two blue cartridges vertically all the way up through the opening at the angle of His, completely  the resected greater curvature from the small sleeve of stomach with the bougie still inside. The bougie was removed. The resected stomach was grasped and brought out through the 15-mm trocar site. An EGD was done and the endoscope was used to examine the staple line. There was no active bleeding. The size of the sleeve looked very good with no dilations, pouches or strictures. The angularis was plenty wide. The abdomen was filled with saline. There was no bubbling from the staple lines indicating the staple lines were all air tight and water tight. The endoscope was withdrawn. All the fluid was removed with the suction. All the CO2 was released. The rest of the trocars were removed. The skin wounds were closed with 4-0 Vicryl dermal stitches and Derma+flex glue was applied, no dressing. The needle and sponge count were correct. The patient tolerated the procedure well and went to recovery in stable condition.     Physician Signature: Electronically signed by Dr. Jessica Painter M.D.    PCP: Sherren Najjar, MD

## 2020-11-16 NOTE — ANESTHESIA PRE PROCEDURE
Department of Anesthesiology  Preprocedure Note       Name:  Akosua Mora   Age:  41 y.o.  :  1980                                          MRN:  [id number]         Date:  2020      Surgeon: Radha Dueñas):  Giuseppe Shankar MD    Procedure: Procedure(s):  GASTRECTOMY SLEEVE LAPAROSCOPIC    Medications prior to admission:   Prior to Admission medications    Medication Sig Start Date End Date Taking? Authorizing Provider   ondansetron (ZOFRAN-ODT) 4 MG disintegrating tablet Take 1 tablet by mouth every 8 hours as needed for Nausea or Vomiting 10/21/20   Giuseppe Shankar MD   omeprazole (PRILOSEC) 20 MG delayed release capsule Take 1 capsule by mouth daily 10/21/20 10/21/21  Giuseppe Shankar MD   lisinopril (PRINIVIL;ZESTRIL) 10 MG tablet Take 1 tablet by mouth daily 9/3/20   Mona Yeboah MD   blood glucose monitor strips Accucheck meter-pt check BS daily. 9/3/20   Mona Yeboah MD   Lancets MISC Accucheck or what insurance covers. 9/3/20   Mona Yeboah MD   sertraline (ZOLOFT) 50 MG tablet Take 1 tablet by mouth daily  Patient taking differently: Take 50 mg by mouth nightly  9/3/20   Mona Yeboah MD   metFORMIN (GLUCOPHAGE) 1000 MG tablet Take 1 tablet by mouth 2 times daily (with meals) 9/3/20   Mona Yeboah MD   Multiple Vitamin (MVI, CELEBRATE, CHEWABLE TABLET) Take 1 tablet by mouth daily    Historical Provider, MD   vitamin D (ERGOCALCIFEROL) 1.25 MG (43520 UT) CAPS capsule Take 1 capsule by mouth once a week 20   Giuseppe Shankar MD   Misc. Devices MISC Give one Accucheck glucometer monitor or whatever the insurance approves. 17   Casi Berry MD   LORATADINE PO Take 10 mg by mouth as needed    Historical Provider, MD       Current medications:    No current facility-administered medications for this visit. No current outpatient medications on file.      Facility-Administered Medications Ordered in Other Visits   Medication Dose Route Frequency Provider Last Rate Last Dose  ceFAZolin (ANCEF) 3 g in dextrose 5 % 100 mL IVPB  3 g Intravenous On Call to [address] [place] [address] MD Priya        lactated ringers infusion   Intravenous Continuous Madalyn Zhou  mL/hr at 11/16/20 0708      sodium chloride flush 0.9 % injection 10 mL  10 mL Intravenous 2 times per day Madalyn Zhou MD        sodium chloride flush 0.9 % injection 10 mL  10 mL Intravenous PRN Madalyn Zhou MD           Allergies: Allergies   Allergen Reactions    Latex Rash    Food Other (See Comments)     Walnuts - Mouth gets sores and pt becomes dry.  Seasonal      Takes Claritin        Problem List:    Patient Active Problem List   Diagnosis Code    Recurrent major depressive disorder ([facility] F33.9    WILL on CPAP G47.33, Z99.89    Diabetes mellitus type 2 in obese (HCC) E11.69, E66.9    Benign essential HTN I10    Depression F32.9    ALVINO (generalized anxiety disorder) F41.1    Morbid obesity with BMI of 50.0-59.9, adult ([facility] E66.01, Z68.43    Gastroesophageal reflux disease without esophagitis K21.9       Past Medical History:        Diagnosis Date    Benign essential HTN     Depression     Diabetes mellitus type 2 in obese ([facility]     GERD without esophagitis     Hyperlipemia     Latex allergy     Morbid obesity due to excess calories (HCC)     WILL on CPAP     10    Sleep apnea     setting 9-10       Past Surgical History:        Procedure Laterality Date    UPPER GASTROINTESTINAL ENDOSCOPY N/A 6/19/2020    EGD BIOPSY performed by Madalyn Zhou MD at [facility] History:    Social History     Tobacco Use    Smoking status: Never Smoker    Smokeless tobacco: Never Used   Substance Use Topics    Alcohol use: Not Currently                                Counseling given: Not Answered      Vital Signs (Current): There were no vitals filed for this visit.                                            BP Readings from Last 3 Encounters:   11/16/20 (!) 141/70   11/09/20 (!) 118/57   10/21/20 (!) 146/83       NPO Status:                                                                                 BMI:   Wt Readings from Last 3 Encounters:   11/16/20 (!) 360 lb (163.3 kg)   11/09/20 (!) 363 lb 9 oz (164.9 kg)   10/21/20 (!) 362 lb (164.2 kg)     There is no height or weight on file to calculate BMI.    CBC:   Lab Results   Component Value Date    WBC 10.1 11/09/2020    RBC 4.47 11/09/2020    HGB 11.6 11/09/2020    HCT 35.7 11/09/2020    MCV 79.9 11/09/2020    RDW 15.1 11/09/2020     11/09/2020       CMP:   Lab Results   Component Value Date     11/09/2020    K 4.2 11/09/2020     11/09/2020    CO2 27 11/09/2020    BUN 9 11/09/2020    CREATININE 0.8 11/09/2020    GFRAA >60 11/09/2020    LABGLOM >60 11/09/2020    GLUCOSE 112 11/09/2020    PROT 7.5 11/09/2020    CALCIUM 9.3 11/09/2020    BILITOT 0.4 11/09/2020    ALKPHOS 88 11/09/2020    AST 15 11/09/2020    ALT 21 11/09/2020       POC Tests: No results for input(s): POCGLU, POCNA, POCK, POCCL, POCBUN, POCHEMO, POCHCT in the last 72 hours.     Coags: No results found for: PROTIME, INR, APTT    HCG (If Applicable): No results found for: PREGTESTUR, PREGSERUM, HCG, HCGQUANT     ABGs: No results found for: PHART, PO2ART, XOE5CIY, PSQ1LCI, BEART, Y5SEGKGA     Type & Screen (If Applicable):  No results found for: LABABO, LABRH    Drug/Infectious Status (If Applicable):  No results found for: HIV, HEPCAB    COVID-19 Screening (If Applicable):   Lab Results   Component Value Date    COVID19 Not Detected 11/09/2020         Anesthesia Evaluation  Patient summary reviewed  Airway: Mallampati: III  TM distance: >3 FB   Neck ROM: full  Mouth opening: > = 3 FB Dental: normal exam         Pulmonary: breath sounds clear to auscultation  (+) sleep apnea: on CPAP,                             Cardiovascular:  Exercise tolerance: good (>4 METS),   (+) hypertension: moderate, hyperlipidemia      ECG reviewed  Rhythm: regular  Rate: normal                    Neuro/Psych:   (+) psychiatric history: stable with treatmentdepression/anxiety             GI/Hepatic/Renal:   (+) GERD:, morbid obesity          Endo/Other:    (+) DiabetesType II DM, , .                 Abdominal:   (+) obese,         Vascular: negative vascular ROS. Anesthesia Plan      general     ASA 3       Induction: intravenous. BIS  MIPS: Prophylactic antiemetics administered. Anesthetic plan and risks discussed with patient. Plan discussed with CRNA.                   Mathew Norman MD   11/16/2020

## 2020-11-16 NOTE — ANESTHESIA POSTPROCEDURE EVALUATION
Department of Anesthesiology  Postprocedure Note    Patient: Melisa Ramos  MRN: [id number]  YOB: 1980  Date of evaluation: 11/16/2020  Time:  10:25 AM     Procedure Summary     Date:  11/16/20 Room / Location:  [address] [place] [address] / [address] [place] [address]    Anesthesia Start:  [address] Anesthesia Stop:  2685    Procedure:  GASTRECTOMY SLEEVE LAPAROSCOPIC (N/A Abdomen) Diagnosis:  (MORBID OBESITY)    Surgeon:  Uvaldo Márquez MD Responsible Provider:  Bhavesh Meadows MD    Anesthesia Type:  general ASA Status:  3          Anesthesia Type: No value filed. Argenis Phase I: Argenis Score: 9    Argenis Phase II:      Last vitals: Reviewed and per EMR flowsheets.        Anesthesia Post Evaluation    Patient location during evaluation: PACU  Patient participation: complete - patient participated  Level of consciousness: awake and alert  Airway patency: patent  Nausea & Vomiting: no vomiting and no nausea  Complications: no  Cardiovascular status: hemodynamically stable  Respiratory status: acceptable  Hydration status: stable

## 2020-11-17 VITALS
BODY MASS INDEX: 42.66 KG/M2 | OXYGEN SATURATION: 96 % | HEART RATE: 64 BPM | SYSTOLIC BLOOD PRESSURE: 159 MMHG | WEIGHT: 315 LBS | RESPIRATION RATE: 16 BRPM | HEIGHT: 72 IN | DIASTOLIC BLOOD PRESSURE: 76 MMHG | TEMPERATURE: 99.1 F

## 2020-11-17 LAB
BASOPHILS ABSOLUTE: 0.01 E9/L (ref 0–0.2)
BASOPHILS RELATIVE PERCENT: 0.1 % (ref 0–2)
EOSINOPHILS ABSOLUTE: 0 E9/L (ref 0.05–0.5)
EOSINOPHILS RELATIVE PERCENT: 0 % (ref 0–6)
HCT VFR BLD CALC: 35.7 % (ref 37–54)
HEMOGLOBIN: 11.4 G/DL (ref 12.5–16.5)
IMMATURE GRANULOCYTES #: 0.05 E9/L
IMMATURE GRANULOCYTES %: 0.4 % (ref 0–5)
LYMPHOCYTES ABSOLUTE: 1.39 E9/L (ref 1.5–4)
LYMPHOCYTES RELATIVE PERCENT: 10.9 % (ref 20–42)
MCH RBC QN AUTO: 25.6 PG (ref 26–35)
MCHC RBC AUTO-ENTMCNC: 31.9 % (ref 32–34.5)
MCV RBC AUTO: 80.2 FL (ref 80–99.9)
METER GLUCOSE: 127 MG/DL (ref 74–99)
MONOCYTES ABSOLUTE: 0.85 E9/L (ref 0.1–0.95)
MONOCYTES RELATIVE PERCENT: 6.7 % (ref 2–12)
NEUTROPHILS ABSOLUTE: 10.45 E9/L (ref 1.8–7.3)
NEUTROPHILS RELATIVE PERCENT: 81.9 % (ref 43–80)
PDW BLD-RTO: 15.2 FL (ref 11.5–15)
PLATELET # BLD: 275 E9/L (ref 130–450)
PMV BLD AUTO: 10.3 FL (ref 7–12)
RBC # BLD: 4.45 E12/L (ref 3.8–5.8)
WBC # BLD: 12.8 E9/L (ref 4.5–11.5)

## 2020-11-17 PROCEDURE — 94761 N-INVAS EAR/PLS OXIMETRY MLT: CPT

## 2020-11-17 PROCEDURE — 85025 COMPLETE CBC W/AUTO DIFF WBC: CPT

## 2020-11-17 PROCEDURE — 6360000002 HC RX W HCPCS: Performed by: SURGERY

## 2020-11-17 PROCEDURE — 99024 POSTOP FOLLOW-UP VISIT: CPT | Performed by: SURGERY

## 2020-11-17 PROCEDURE — 82962 GLUCOSE BLOOD TEST: CPT

## 2020-11-17 PROCEDURE — 2580000003 HC RX 258: Performed by: SURGERY

## 2020-11-17 PROCEDURE — 36415 COLL VENOUS BLD VENIPUNCTURE: CPT

## 2020-11-17 RX ADMIN — SODIUM CHLORIDE, POTASSIUM CHLORIDE, SODIUM LACTATE AND CALCIUM CHLORIDE: 600; 310; 30; 20 INJECTION, SOLUTION INTRAVENOUS at 04:56

## 2020-11-17 RX ADMIN — MORPHINE SULFATE 2 MG: 2 INJECTION, SOLUTION INTRAMUSCULAR; INTRAVENOUS at 03:37

## 2020-11-17 ASSESSMENT — PAIN DESCRIPTION - PROGRESSION
CLINICAL_PROGRESSION: GRADUALLY IMPROVING
CLINICAL_PROGRESSION: GRADUALLY IMPROVING

## 2020-11-17 ASSESSMENT — PAIN DESCRIPTION - FREQUENCY: FREQUENCY: INTERMITTENT

## 2020-11-17 ASSESSMENT — PAIN DESCRIPTION - ORIENTATION: ORIENTATION: RIGHT;LEFT;MID

## 2020-11-17 ASSESSMENT — PAIN SCALES - GENERAL
PAINLEVEL_OUTOF10: 4
PAINLEVEL_OUTOF10: 0
PAINLEVEL_OUTOF10: 0

## 2020-11-17 ASSESSMENT — PAIN DESCRIPTION - DESCRIPTORS: DESCRIPTORS: ACHING;DISCOMFORT

## 2020-11-17 ASSESSMENT — PAIN DESCRIPTION - PAIN TYPE: TYPE: ACUTE PAIN;SURGICAL PAIN

## 2020-11-17 ASSESSMENT — PAIN DESCRIPTION - LOCATION: LOCATION: ABDOMEN

## 2020-11-17 ASSESSMENT — PAIN DESCRIPTION - ONSET: ONSET: ON-GOING

## 2020-11-17 ASSESSMENT — PAIN - FUNCTIONAL ASSESSMENT: PAIN_FUNCTIONAL_ASSESSMENT: PREVENTS OR INTERFERES SOME ACTIVE ACTIVITIES AND ADLS

## 2020-11-17 NOTE — DISCHARGE SUMMARY
Physician Discharge Summary     Margarito Delgado  [id number]    Admit date: 11/16/2020    Discharge date and time: 11/17/2020     Admitting Physician: Pricilla Ann MD     Condition: stable    Patient Active Problem List   Diagnosis    Recurrent major depressive disorder ([facility]    WILL on CPAP    Diabetes mellitus type 2 in obese ([facility]    Benign essential HTN    Depression    ALVINO (generalized anxiety disorder)    Morbid obesity with BMI of 50.0-59.9, adult ([facility]    Gastroesophageal reflux disease without esophagitis    S/P laparoscopic sleeve gastrectomy       Hospital Course: Margarito Delgado is a 41 y.o. male one day post-op from a laparoscopic Gastric sleeve. He did well with surgery, pain has been well controlled over night, walking well in the halls. Labs normal. Magdaleno out. He is tolerating the Bariatric clear liquid diet with no nausea, no pain. Incisions all clean and dry, glue in place. Lab Results   Component Value Date    WBC 12.8 11/17/2020    HGB 11.4 11/17/2020     11/17/2020     11/09/2020     11/09/2020    K 4.2 11/09/2020    BUN 9 11/09/2020    CREATININE 0.8 11/09/2020    GLUCOSE 112 11/09/2020    LABGLOM >60 11/09/2020    LABALBU 3.7 11/09/2020    PROT 7.5 11/09/2020    CALCIUM 9.3 11/09/2020    BILITOT 0.4 11/09/2020    ALKPHOS 88 11/09/2020    AST 15 11/09/2020    ALT 21 11/09/2020       Discharge Exam:   VITALS: BP (!) 159/76   Pulse 64   Temp 99.1 °F (37.3 °C) (Oral)   Resp 16   Ht 6' (1.829 m)   Wt (!) 358 lb (162.4 kg)   SpO2 96%   BMI 48.55 kg/m²     General appearance: alert, appears stated age and cooperative  Neck: no adenopathy, no carotid bruit, no JVD, supple, symmetrical, trachea midline and thyroid not enlarged, symmetric, no tenderness/mass/nodules  Lungs: clear to auscultation bilaterally  Heart: regular rate and rhythm, S1, S2 normal, no murmur, click, rub or gallop  Abdomen: Incisions clean and dry, surgical glue in place.   Extremities:

## 2020-11-17 NOTE — PROGRESS NOTES
GENERAL SURGERY  DAILY PROGRESS NOTE  11/17/2020    Cc: s/p sleeve gastrectomy    Subjective:  No events overnight. Pain well controlled. No nausea after antiemetic meds, tolerating clears    Objective:  BP (!) 159/76   Pulse 64   Temp 99.1 °F (37.3 °C) (Oral)   Resp 16   Ht 6' (1.829 m)   Wt (!) 358 lb (162.4 kg)   SpO2 96%   BMI 48.55 kg/m²     General appearance: alert, cooperative and in no acute distress. Eyes: grossly normal  Lungs: nonlabored breathing on home CPAP  Heart: regular rate  Abdomen:  Soft, appropriate incisional tenderness, non distended, lap incisions clean  Skin: No skin abnormalities  Neurologic: Alert and oriented x 3.  Grossly normal    Assessment/Plan:  41 y.o. male POD1 s/p laparoscopic sleeve gastrectomy, doing well    Pain control  Continue bariatric clears per protocol  Wean O2 once off CPAP  IVF while in house  ambulate  Likely discharge home today    Electronically signed by Cal Interiano MD on 11/17/2020 at 6:56 AM

## 2020-11-17 NOTE — PROGRESS NOTES
Internal Medicine Progress Note     [facility]     Anitra Swift. Ej Crimes., F.A.C.OManuelI. Sandy aJckson D.O., CANDELARIO Maurice, MSN, APRN, NP-C  Lillie Chiu. Rebecca Wetzel, MSN, APRN-CNP     Primary Care Physician: Sai Piper MD   Admitting Physician:  Ivana Law MD  Admission date and time: 11/16/2020  6:40 AM    Room:  0333/0333-01    Patient Name: Regis Saint  MRN: [id number]    Date of Service: 11/17/2020     Subjective:  Дмитрий Osborn is seen and examined at bedside this morning. The surgery team performed gastric bypass and hiatal hernia repair yesterday. He states he has essentially no pain. He has nasal cannula oxygen in place however does not talk to oxygen flow meter and he is on room air at 96% without any overt respiratory complaints. He is tolerating his limited diet and is ambulating in the hallway. He has passed flatus but no bowel movement as of yet. He is feeling well and requesting to go home. Review of System: HEENT:  Calvin ear pain, sore throat, sinus or eye problems  Cardiovascular:   Denies any chest pain, irregular heartbeats, or palpitations. Respiratory:   Denies shortness of breath, coughing, sputum production, hemoptysis, or wheezing. Gastrointestinal:   Less than expected abdominal pain postoperatively. No nausea or vomiting. Positive for for passage of flatus without bowel movement. Extremities:   Denies any lower extremity swelling or edema. Neurology:    Denies any headache or focal neurological deficits. No weakness or paresthesia. Derm:    Denies any rashes, ulcers, or excoriations. Denies bruising. Genitourinary:    Denies any urgency, frequency, hematuria. Voiding without difficulty. Musculoskeletal:  Denies myalgias, joint complaints or back pain      Physical Exam:  No intake/output data recorded.   Blood pressure (!) 159/76, pulse 64, temperature 99.1 °F (37.3 °C), temperature source Oral, resp. rate 16, height 6' (1.829 m), weight (!) 358 lb (162.4 kg), SpO2 96 %. HEENT:    PERRLA. EOMI. Sclera clear. Buccal mucosa moist.  Neck:    Supple. Trachea midline. No thyromegaly. No JVD. No bruits. Heart:    Rhythm regular, rate controlled. No murmurs. Lungs:    Symmetrical. Clear to auscultation bilaterally. No wheezes. No rhonchi. No rales. Abdomen:   Soft. Obese contour with unremarkable postoperative appearance and expected degree of tenderness to palpation, bowel sounds positive. No organomegaly or masses. No pain on palpation  Extremities:    Peripheral pulses present. No peripheral edema. No ulcers. Neurologic:    Alert x 3. No focal deficit. Cranial nerves grossly intact. Skin:    No petechia. No hemorrhage. No wounds. Psych:   Behavior is normal. Mood appears normal. Speech is not rapid or pressured. Musculokeletal:  Spine ROM normal. Muscular strength intact. Gait not assessed.   Genitalia/Breast:  Deferred    Scheduled Meds:   sodium chloride flush  10 mL Intravenous 2 times per day    [START ON 11/19/2020] scopolamine  1 patch Transdermal Q72H       Continuous Infusions:   lactated ringers 125 mL/hr at 11/17/20 0456       Objective Data:  CBC with Differential:    Lab Results   Component Value Date    WBC 12.8 11/17/2020    RBC 4.45 11/17/2020    HGB 11.4 11/17/2020    HCT 35.7 11/17/2020     11/17/2020    MCV 80.2 11/17/2020    MCH 25.6 11/17/2020    MCHC 31.9 11/17/2020    RDW 15.2 11/17/2020    LYMPHOPCT 10.9 11/17/2020    MONOPCT 6.7 11/17/2020    BASOPCT 0.1 11/17/2020    MONOSABS 0.85 11/17/2020    LYMPHSABS 1.39 11/17/2020    EOSABS 0.00 11/17/2020    BASOSABS 0.01 11/17/2020     BMP:    Lab Results   Component Value Date     11/09/2020    K 4.2 11/09/2020     11/09/2020    CO2 27 11/09/2020    BUN 9 11/09/2020    LABALBU 3.7 11/09/2020    CREATININE 0.8 11/09/2020    CALCIUM 9.3 11/09/2020    GFRAA >60 11/09/2020    LABGLOM >60 11/09/2020    GLUCOSE 112 11/09/2020       Wound Documentation:          Assessment:   Patient Active Problem List    Diagnosis Date Noted    S/P laparoscopic sleeve gastrectomy 11/16/2020    Gastroesophageal reflux disease without esophagitis     Morbid obesity with BMI of 50.0-59.9, adult ([facility] 09/13/2019    ALVINO (generalized anxiety disorder) 09/11/2019    WILL on CPAP     Diabetes mellitus type 2 in obese ([facility]     Benign essential HTN     Depression     Recurrent major depressive disorder ([facility] 08/21/2017     ASSESSMENT:  1. Status post sleeve gastrectomy in the setting of super morbid obesity  2. Concomitant midline hiatal hernia repair  3. Obstructive sleep apnea with obesity hypoventilatory syndrome  4. Non-insulin-dependent diabetes mellitus type 2  5. Essential hypertension  6. Hyperlipidemia     Plan:   Maurizio tolerated surgery well and he has very minimal symptoms. Symptomatic and supportive care are being employed at the discretion of the general surgery team.  He is tolerating his limited diet. He is ambulating in the halls. He has passed flatus. He is off oxygen since removing CPAP and has a 96% saturation at rest.  We will assess his ambulatory pulse ox today as we anticipate his discharge. His chronic morbidities are well managed. Continue current therapy. See orders for further plan of care. More than 50% of my time was spent at the bedside counseling/coordinating care with the patient and/or family with face to face contact. This time was spent reviewing notes and laboratory data as well as instructing and counseling the patient. Time I spent with the family or surrogate(s) is included only if the patient was incapable of providing the necessary information or participating in medical decisions. I also discussed the differential diagnosis and all of the proposed management plans with the patient and individuals accompanying the patient.  I am readily available for any further decision-making and intervention.        YURIY Delong - CNP,  11/17/2020  7:09 AM

## 2020-11-18 NOTE — PROGRESS NOTES
Pharmacy Opioid Conversion  (For Research Purposes Only)      Date  Opioid Administration Time Oral Milligram Morphine Equivalent (MME) Total MME per day   11/16 Fentanyl 150 mcg IV x 1 0751 45 80 mg    Fentanyl 50 mcg IV x 1 0844 15     Fentanyl 50 mcg IV x 1 0909 15     Hydromorphone 0.25 mg IV x 1 0943 5    11/17 Morphine 2 mg IV x 1 0337 6 6 mg       Total Oral Morphine Equivalent : 86 mg      Valerie Gillette PharmD, BCPS 11/18/2020 10:53 AM

## 2020-12-02 ENCOUNTER — INITIAL CONSULT (OUTPATIENT)
Dept: BARIATRICS/WEIGHT MGMT | Age: 40
End: 2020-12-02
Payer: MEDICARE

## 2020-12-02 ENCOUNTER — OFFICE VISIT (OUTPATIENT)
Dept: BARIATRICS/WEIGHT MGMT | Age: 40
End: 2020-12-02
Payer: MEDICARE

## 2020-12-02 VITALS
RESPIRATION RATE: 18 BRPM | WEIGHT: 315 LBS | SYSTOLIC BLOOD PRESSURE: 160 MMHG | BODY MASS INDEX: 42.66 KG/M2 | DIASTOLIC BLOOD PRESSURE: 77 MMHG | TEMPERATURE: 97.6 F | HEART RATE: 87 BPM | HEIGHT: 72 IN

## 2020-12-02 VITALS — BODY MASS INDEX: 42.66 KG/M2 | HEIGHT: 72 IN | WEIGHT: 315 LBS

## 2020-12-02 PROCEDURE — 99212 OFFICE O/P EST SF 10 MIN: CPT

## 2020-12-02 PROCEDURE — 99024 POSTOP FOLLOW-UP VISIT: CPT | Performed by: SURGERY

## 2020-12-02 PROCEDURE — 99999 PR OFFICE/OUTPT VISIT,PROCEDURE ONLY: CPT

## 2020-12-02 NOTE — PATIENT INSTRUCTIONS
Please continue to take your vitamin and mineral supplements as instructed. If you received a blood work prescription today for laboratory monitoring due prior to your next routine follow-up visit, please have this blood work obtained 10 to 14 days prior to your next visit. It is important to fast for 12 hours prior to routine weight loss surgery blood work, EXCEPT for drinking water, to ensure accuracy of results. Please report nausea, vomiting, abdominal pain, or any other problems you experience to your surgeon. For problems related to weight loss surgery, it is [facility] to [facility] and have your surgeon paged.

## 2020-12-02 NOTE — PROGRESS NOTES
Medical Nutrition Therapy (MNT) Assessment     Pt. Name: Martell Matute   Date:12/2/2020  F/U Appt: Sx Type 2 week post op LSG    Food Records Kept: Yes  24Hour Recall Completed at Office:No    Ht 6' (1.829 m)   Wt (!) 338 lb (153.3 kg)   BMI 45.84 kg/m²  Height: 6' (1.829 m) Weight: (!) 338 lb (153.3 kg)   IBW:ideal body weight   164 lbs % EBWL: 12%     Wt Readings from Last 3 Encounters:   12/02/20 (!) 338 lb (153.3 kg)   12/02/20 (!) 338 lb (153.3 kg)   11/16/20 (!) 358 lb (162.4 kg)                     Protein supplements: Pt. is currently using the following protein supplement nectar and consuming the following grams of protein 65-70. Rd / Ld reviewed with patient based on 1.0 gram per kg of IBW patient needs 75-85 grams of protein total daily.       Subjective:                   Current MNT:       Bariatric full liquid diet with MVI, Calcium & Protein Supplements     MNT Advanced to:     Bariatric puree diet with MVI, Calcium & Protein Supplements      Allergies and Food Allergies and Food Intolerances:  Walnuts    Nutritional Data  No - Poor appetite more than 5 days     No - NPO or clear liquid more than 3 days     No - Problem Chewing      No - Problem Swallowing      No - Problem Mouth Pain      No - Problem Denture  No - Missing Teeth         No - Pressure Sore    No - Open Wound    No - Surgical Wound  No - Documented: Sepsis or Infection    No - Nausea  No - Vomiting    SWLC Bowel Protocol:  Patient states he / she has the following bowel movements per week 7  no - Diarrhea  No - Steatorrhea  No - Constipation  When was your last bowel movement today  How much plain water are you drinking daily 64 oz   What other beverages / fluids are you drinking daily and the amount Gatorade, SF Koolaid, SF Jello  Yes - Are you taking Colace daily  What amount of Colace are you taking daily 2  Yes - Are you taking Sugar Free Chewable Fiber Gummies  What amount of Sugar Free Chewable Fiber Gummies are you taking daily 6  No - Did your surgeon discuss constipation with you? Yes - Did your surgeon discuss increasing water intake? How much water were you instructed to take daily? 64 oz    No - Did your surgeon discuss continuing Colace? How much Colace did your surgeon instruct you to take? n/a  No - Did your surgeon discuss stopping Colace? No - Did your surgeon discuss starting Fiber Gummies? How much Fiber Gummies did your surgeon instruct you to take? n/a  Did your surgeon discuss any other medications / supplements to take daily to move your bowels and avoid constipation? no  No Patient was provided today the Constipation Handout  Referred pt to section 12 regarding constipation  Yes Rd Walter reinforced pt needs to consume the following - Water Intake should be 64 , Fiber Chews prn, Dietary Fiber Intake 25 grams plus        No - Hair loss   No - Weight regain       No - Acid Reflux  No - Dumping Syndrome      No - Food gets stuck  No - Are you eating solids - should not be eating solids until 6 weeks post-op. no - Night Time Coughing  no -  B Ping Noted  No - Are you chewing thoroughly  - Does not take effect until 6 weeks post-op  No - Are you hungry after eating     How many meals a day 5-6 / Portions Sizes of Meals are 3-4 oz          Labs: none     Supplements: Bariatric Advantage Multi-Vitamin 1 tablet 2 times Daily, Bariatric Advantage 29 mgs Iron 1 tablet Daily, Bariatric Advanatge Calcium Lozenges 1 tablet 3 times Daily , Dry Vitamin D3 5,000iu every day    Estimated Daily Nutritional Needs: Based on Bariatric procedure for Wt. Loss  Energy: Will be calculated at Maintenance Stage    Protein: 60 - 80 gms Daily    MNT Plan and Additional Education: RD/LD reviewed Bariatric Puree Diet and how to puree food. Encouraged pt to meet protein and fluid needs daily. Pt. verbalized understanding. Handouts given. Pt. Instructed to call with questions.     MEDICAL NUTRITION THERAPY (MNT) - Nutrition Care Plan   (The patient has been educated and given written education material that reinforces the following dietary guidelines for Bariatric Surgery)  Goal:  Patient able to verbalize the following dietary concepts:  3 to 4 ounce portions per meal     6 small meals daily      60 to 80 grams of protein   48 to 64 ounces of fluid daily (water)     Always consume protein item first with all meals   Pt is aware wt loss is pt controlled. Slow Meals - 30-35 chews per bite / Meals 30 - 45 minutes long            The RD / LD reviewed with the patient that the dietary goals of the bariatric patient is to ensure that patient is able to meet established nutritional needs for a Bariatric Surgery  Patient at this time in order to promote healing, prevent significant weight changes, prevent skin breakdown and abnormal lab values, prevent complications, and tolerance to diet and texture of foods. Pt is able to identify proper food choices and needed changes and is able to explain proper food preparation. RD / LD reviewed compliance with assigned diet stages, volume of food and drink consumed, timing of meals, amount of protein and energy intake, daily vitamin and mineral supplementation, identify food cravings and any adverse reactions associated with intake of food and drink. RD / LD uses behavioral tools such as goal setting, MI, and self-monitoring, to reinforce the anatomic and physiologic effects of the surgery, so that the described behaviors become more of a habit not simply a reaction to the altered anatomy. Care Plan:   · Rd/Ld Addressed Food Records or 24-Hour Recall  · Rd / Ld encouraged patient to exercise at least 30 minutes daily  · Rd / Ld instructed patient on how to increase oral protein intake within the diet. Pt. can verbalize he / she will need to consume 60 - 80 grams. · Rd / Ld instructed the patient on how to increase the use of protein supplements within the diet. · Pt. was instructed on how to increase water intake. Patient will need to consume 48 - 64 oz. of just plain water in addition to 30 oz. of non-caloric beverages. · Handouts given to patient  · RD / LD encouraged oral intake    · RD / LD encouraged pt. to keep food records.       · Pt. is able to verbalize diet concepts      No - Constipation Handout Given and Reviewed    No - High Fiber Handout Given and Reviewed      No - Ulcer Handout Given and Reviewed          No - Pt. was instructed to continue current MNT   Yes - Pt. diet was advanced to the following stage ( See above)   No - Supplements: initiated (See list below  - Pt. given instruction)     No - Supplemental foods:______________________  No - Pt. was placed on a altered meal schedule    Good - Dietary Compliance

## 2020-12-14 NOTE — PROGRESS NOTES
[facility]  Internal Medicine Residency Program  ACC Note      SUBJECTIVE:  PMH:  · DM, Metformin 1000mg BD, HbA1C 6.8 (6/19/2020), no microalbuminuria (6/8/2020) - off  · HTN, Lisinopril 10mg OD - not taking  · HLD, not on statin, LDL 91,  (6/8/2020); has not tolerated multiple statin types -  · Generalized anxiety disorder, Sertraline 50mg OD  · Vitamin D deficiency, Vit D2 [id number] started from 6/24/2020 weekly, Vit D 25 at 7 (6/19/2020) -> 21 (9/14/2020); currently on D3 5000UT  · WILL, CPAP, 10 years. · GERD, Hiatal hernia,s/p EGD 6/19/2020 negative for gastritis and H.pylori  · Obesity class III s/p laparoscopic sleeve gastrectomy 11/16/2020, multi-disciplinary approach    CC: had concerns including Diabetes and Bariatrics Post Op Follow Up (11/16/2020). Dina Trinh presented to the [facility] for a routine visit    Off metformin - home glucose 30 day average around 110; this morning 111, some evening numbers 80-90s. Checked A1c: 5.5    Off Lisinopril - blood pressure stable off medication; at clinic 124/65 today    Has to drink less liquid at a time, due to feeling of streching. Otherwise no new complains. Will follow up with the surgery team on Jan 13, 2021. Has lab works ordered already. Review Of Systems:  General: no fevers, chills, weight loss or gain.    Ears/Nose/Throat: no hearing loss, tinnitus, vertigo, nosebleed, nasal congestion, rhinorrhea, sore throat  Respiratory: no cough, pleuritic chest pain, dyspnea, or wheezing  Cardiovascular: no chest pain, angina, dyspnea on exertion, orthopnea, PND, palpitations, or claudication  Gastrointestinal: no nausea, vomiting, heartburn, diarrhea, constipation, abdominal pain, hematochezia or melena  Genitourinary: no urinary urgency, frequency, dysuria, nocturia, hesitancy, or incontinence  Musculoskeletal: no arthritis, arthralgia, myalgia, weakness, or morning stiffness Skin: no abnormal pigmentation, rash, itching, masses, hair or nail changes    Current Outpatient Medications on File Prior to Visit   Medication Sig Dispense Refill    Multiple Vitamin (MVI, BARIATRIC ADVANTAGE MULTI-FORMULA, CHEW TAB) Take 1 tablet by mouth daily      Calcium Carbonate (CALCI-CHEW PO) Take 1 tablet by mouth 3 times daily      Ferrous Sulfate (IRON PO) Take 1 tablet by mouth daily      Cholecalciferol (VITAMIN D3) 125 MCG (5000 UT) TABS Take 1 tablet by mouth daily      omeprazole (PRILOSEC) 20 MG delayed release capsule Take 1 capsule by mouth daily 30 capsule 12    Lancets MISC Accucheck or what insurance covers. 100 each 2    LORATADINE PO Take 10 mg by mouth as needed      blood glucose monitor strips Accucheck meter-pt check BS daily. 100 strip 2    Misc. Devices MISC Give one Accucheck glucometer monitor or whatever the insurance approves. 1 Device 0     No current facility-administered medications on file prior to visit. OBJECTIVE:    VS: /65 (Site: Left Upper Arm, Position: Sitting, Cuff Size: Medium Adult)   Pulse 66   Temp 97.1 °F (36.2 °C) (Oral)   Resp 20   Ht 6' (1.829 m)   Wt (!) 329 lb (149.2 kg)   SpO2 99%   BMI 44.62 kg/m²   General appearance: Alert, Awake, Oriented times 3, no distress  Head: atraumatic, normocephalic  Neck: no JVD, trachea midline, no adenopathy  Ear: bilateral grossly equal hearing, external ear normal  Eyes: bilateral pupils are equal, round and reactive, no icterus, no pallor, EOM intact, no nystagmus  Lungs: Lungs clear to auscultation bilaterally. No rhonchi, crackles or wheezes  Heart: S1 S2  Regular rate and rhythm. No rub, murmur or gallop  Abdomen: Abdomen soft but obese, non-tender. non-distended BS normal. No masses, organomegaly, no guarding rebound or rigidity. Well healed laparoscopic access points.   Extremities: No edema, Peripheral pulses palpable 2/4  Hematology: no petechia, purpura or ecchymoses, no adenopathy Neurology: CN grossly intact, power/reflex/bulk bilateral equal in all extremities  Psych: normal behaviour, thought process intact, sleep normal, anhedonia absent, normal energy, normal concentration, no suicidal ideation    ASSESSMENT/PLAN:  Bert Townsend was seen today for diabetes and bariatrics post op follow up. Diagnoses and all orders for this visit:    Post-operative nausea and vomiting  -     ondansetron (ZOFRAN-ODT) 4 MG disintegrating tablet; Take 1 tablet by mouth every 8 hours as needed for Nausea or Vomiting    Recurrent major depressive disorder, remission status unspecified (HCC)  -     sertraline (ZOLOFT) 50 MG tablet; Take 1 tablet by mouth nightly    Nausea    Needs flu shot  -     INFLUENZA, QUADV, 3 YRS AND OLDER, IM PF, PREFILL SYR OR SDV, 0.5ML (AFLURIA QUADV, PF)    Diabetes mellitus type 2 in obese (HCC)  -     POCT glycosylated hemoglobin (Hb A1C)        I have reviewed all pertient PMHx, PSHx, FamHx, Social Hx, medications, and allergies and updated history as appropriate. RTC:    I have reviewed my findings and recommendations with Sveta Lamas and Dr. Saúl Abraham.     Sherren Najjar, MD PGY-2   12/15/2020 9:31 AM

## 2020-12-15 ENCOUNTER — OFFICE VISIT (OUTPATIENT)
Dept: INTERNAL MEDICINE | Age: 40
End: 2020-12-15
Payer: MEDICARE

## 2020-12-15 VITALS
HEART RATE: 66 BPM | RESPIRATION RATE: 20 BRPM | TEMPERATURE: 97.1 F | HEIGHT: 72 IN | BODY MASS INDEX: 42.66 KG/M2 | DIASTOLIC BLOOD PRESSURE: 65 MMHG | OXYGEN SATURATION: 99 % | WEIGHT: 315 LBS | SYSTOLIC BLOOD PRESSURE: 124 MMHG

## 2020-12-15 LAB — HBA1C MFR BLD: 5.5 %

## 2020-12-15 PROCEDURE — 99212 OFFICE O/P EST SF 10 MIN: CPT | Performed by: INTERNAL MEDICINE

## 2020-12-15 PROCEDURE — 99213 OFFICE O/P EST LOW 20 MIN: CPT | Performed by: INTERNAL MEDICINE

## 2020-12-15 PROCEDURE — 1111F DSCHRG MED/CURRENT MED MERGE: CPT | Performed by: INTERNAL MEDICINE

## 2020-12-15 PROCEDURE — 1036F TOBACCO NON-USER: CPT | Performed by: INTERNAL MEDICINE

## 2020-12-15 PROCEDURE — G0008 ADMIN INFLUENZA VIRUS VAC: HCPCS

## 2020-12-15 PROCEDURE — 90686 IIV4 VACC NO PRSV 0.5 ML IM: CPT

## 2020-12-15 PROCEDURE — 3044F HG A1C LEVEL LT 7.0%: CPT | Performed by: INTERNAL MEDICINE

## 2020-12-15 PROCEDURE — G8427 DOCREV CUR MEDS BY ELIG CLIN: HCPCS | Performed by: INTERNAL MEDICINE

## 2020-12-15 PROCEDURE — G8482 FLU IMMUNIZE ORDER/ADMIN: HCPCS | Performed by: INTERNAL MEDICINE

## 2020-12-15 PROCEDURE — G8417 CALC BMI ABV UP PARAM F/U: HCPCS | Performed by: INTERNAL MEDICINE

## 2020-12-15 PROCEDURE — 2022F DILAT RTA XM EVC RTNOPTHY: CPT | Performed by: INTERNAL MEDICINE

## 2020-12-15 PROCEDURE — 6360000002 HC RX W HCPCS

## 2020-12-15 PROCEDURE — 83036 HEMOGLOBIN GLYCOSYLATED A1C: CPT | Performed by: INTERNAL MEDICINE

## 2020-12-15 RX ORDER — ONDANSETRON 4 MG/1
4 TABLET, ORALLY DISINTEGRATING ORAL EVERY 8 HOURS PRN
Qty: 10 TABLET | Refills: 2 | Status: SHIPPED
Start: 2020-12-15 | End: 2021-05-05 | Stop reason: ALTCHOICE

## 2020-12-15 NOTE — PROGRESS NOTES
Attending Physician Statement  I have discussed the case, including pertinent history and exam findings with the resident. I agree with the assessment, plan and orders as documented by the resident. Pt presented for the follow up SP bariatric surgery November 16, doing well and has been off BP medication and also off metformin with stable home BG. Also vitamin D deficiency and has been on supplement, and recommended to continue sertraline.   Unruly Bruce MD

## 2020-12-15 NOTE — PROGRESS NOTES
Patient given instruction by Dr Antonio Khan. 6 month follow up scheduled. Printed AVS given to patient.

## 2020-12-15 NOTE — PATIENT INSTRUCTIONS
- continue same medications as of now  - continue off of lisinopril and metformin  - can occasionally check blood glucose at this point

## 2021-01-11 ENCOUNTER — HOSPITAL ENCOUNTER (OUTPATIENT)
Age: 41
Discharge: HOME | End: 2021-01-11
Payer: MEDICARE

## 2021-01-11 DIAGNOSIS — K91.2 MALNUTRITION FOLLOWING GASTROINTESTINAL SURGERY: ICD-10-CM

## 2021-01-11 LAB
ALBUMIN SERPL-MCNC: 4 G/DL (ref 3.5–5.2)
ALP BLD-CCNC: 87 U/L (ref 40–129)
ALT SERPL-CCNC: 20 U/L (ref 0–40)
ANION GAP SERPL CALCULATED.3IONS-SCNC: 12 MMOL/L (ref 7–16)
AST SERPL-CCNC: 15 U/L (ref 0–39)
BILIRUB SERPL-MCNC: 0.4 MG/DL (ref 0–1.2)
BUN BLDV-MCNC: 8 MG/DL (ref 6–20)
CALCIUM SERPL-MCNC: 10.1 MG/DL (ref 8.6–10.2)
CHLORIDE BLD-SCNC: 99 MMOL/L (ref 98–107)
CHOLESTEROL, TOTAL: 172 MG/DL (ref 0–199)
CO2: 29 MMOL/L (ref 22–29)
CREAT SERPL-MCNC: 0.9 MG/DL (ref 0.7–1.2)
FERRITIN: 339 NG/ML
FOLATE: 14 NG/ML (ref 4.8–24.2)
GFR AFRICAN AMERICAN: >60
GFR NON-AFRICAN AMERICAN: >60 ML/MIN/1.73
GLUCOSE BLD-MCNC: 108 MG/DL (ref 74–99)
HCT VFR BLD CALC: 39.8 % (ref 37–54)
HEMOGLOBIN: 13.3 G/DL (ref 12.5–16.5)
MCH RBC QN AUTO: 26.3 PG (ref 26–35)
MCHC RBC AUTO-ENTMCNC: 33.4 % (ref 32–34.5)
MCV RBC AUTO: 78.8 FL (ref 80–99.9)
PDW BLD-RTO: 14.5 FL (ref 11.5–15)
PLATELET # BLD: 289 E9/L (ref 130–450)
PMV BLD AUTO: 10.4 FL (ref 7–12)
POTASSIUM SERPL-SCNC: 4.4 MMOL/L (ref 3.5–5)
PREALBUMIN: 15 MG/DL (ref 20–40)
RBC # BLD: 5.05 E12/L (ref 3.8–5.8)
SODIUM BLD-SCNC: 140 MMOL/L (ref 132–146)
TOTAL PROTEIN: 7.7 G/DL (ref 6.4–8.3)
TRIGL SERPL-MCNC: 165 MG/DL (ref 0–149)
VITAMIN B-12: 837 PG/ML (ref 211–946)
VITAMIN D 25-HYDROXY: 37 NG/ML (ref 30–100)
WBC # BLD: 10.7 E9/L (ref 4.5–11.5)

## 2021-01-11 PROCEDURE — 84478 ASSAY OF TRIGLYCERIDES: CPT

## 2021-01-11 PROCEDURE — 82728 ASSAY OF FERRITIN: CPT

## 2021-01-11 PROCEDURE — 85027 COMPLETE CBC AUTOMATED: CPT

## 2021-01-11 PROCEDURE — 82465 ASSAY BLD/SERUM CHOLESTEROL: CPT

## 2021-01-11 PROCEDURE — 82306 VITAMIN D 25 HYDROXY: CPT

## 2021-01-11 PROCEDURE — 36415 COLL VENOUS BLD VENIPUNCTURE: CPT

## 2021-01-11 PROCEDURE — 82746 ASSAY OF FOLIC ACID SERUM: CPT

## 2021-01-11 PROCEDURE — 82607 VITAMIN B-12: CPT

## 2021-01-11 PROCEDURE — 80053 COMPREHEN METABOLIC PANEL: CPT

## 2021-01-11 PROCEDURE — 84134 ASSAY OF PREALBUMIN: CPT

## 2021-01-11 PROCEDURE — 84630 ASSAY OF ZINC: CPT

## 2021-01-11 PROCEDURE — 84425 ASSAY OF VITAMIN B-1: CPT

## 2021-01-13 ENCOUNTER — INITIAL CONSULT (OUTPATIENT)
Dept: BARIATRICS/WEIGHT MGMT | Age: 41
End: 2021-01-13
Payer: MEDICARE

## 2021-01-13 ENCOUNTER — OFFICE VISIT (OUTPATIENT)
Dept: BARIATRICS/WEIGHT MGMT | Age: 41
End: 2021-01-13
Payer: MEDICARE

## 2021-01-13 VITALS
HEIGHT: 72 IN | BODY MASS INDEX: 42.66 KG/M2 | SYSTOLIC BLOOD PRESSURE: 157 MMHG | HEART RATE: 72 BPM | WEIGHT: 315 LBS | TEMPERATURE: 97.8 F | RESPIRATION RATE: 18 BRPM | DIASTOLIC BLOOD PRESSURE: 75 MMHG

## 2021-01-13 VITALS — HEIGHT: 72 IN | BODY MASS INDEX: 42.66 KG/M2 | WEIGHT: 315 LBS

## 2021-01-13 DIAGNOSIS — E66.01 MORBID OBESITY DUE TO EXCESS CALORIES (HCC): ICD-10-CM

## 2021-01-13 DIAGNOSIS — Z71.3 DIETARY COUNSELING: Primary | ICD-10-CM

## 2021-01-13 DIAGNOSIS — K91.2 MALNUTRITION FOLLOWING GASTROINTESTINAL SURGERY: ICD-10-CM

## 2021-01-13 DIAGNOSIS — K90.2 POSTOPERATIVE BLIND LOOP SYNDROME: Primary | ICD-10-CM

## 2021-01-13 PROCEDURE — 99024 POSTOP FOLLOW-UP VISIT: CPT | Performed by: SURGERY

## 2021-01-13 PROCEDURE — 99212 OFFICE O/P EST SF 10 MIN: CPT

## 2021-01-13 PROCEDURE — 99999 PR OFFICE/OUTPT VISIT,PROCEDURE ONLY: CPT

## 2021-01-13 RX ORDER — DOCUSATE SODIUM 100 MG/1
100 CAPSULE, LIQUID FILLED ORAL 3 TIMES DAILY PRN
COMMUNITY
End: 2021-05-05 | Stop reason: ALTCHOICE

## 2021-01-13 NOTE — PATIENT INSTRUCTIONS
Please continue to take your vitamin and mineral supplements as instructed. If you received a blood work prescription today for laboratory monitoring due prior to your next routine follow-up visit, please have this blood work obtained 10 to 14 days prior to your next visit. It is important to fast for 12 hours prior to routine weight loss surgery blood work, EXCEPT for drinking water, to ensure accuracy of results. Please report nausea, vomiting, abdominal pain, or any other problems you experience to your surgeon. For problems related to weight loss surgery, it is best to go to [address] [place] [address] Emergency Department and have your surgeon paged.

## 2021-01-13 NOTE — PROGRESS NOTES
Medical Nutrition Therapy (MNT) Assessment     Pt. Name: Zainab Hunter   Date:1/13/2021  F/U Appt: Sx Type 6 week post op LSG     Food Records Kept: No   Sometimes does   24Hour Recall Completed at Office:No    Ht 6' (1.829 m)   Wt (!) 327 lb (148.3 kg)   BMI 44.35 kg/m²  Height: 6' (1.829 m) Weight: (!) 327 lb (148.3 kg)   IBW:ideal body weight   164 lbs % EBWL: 17%     Wt Readings from Last 3 Encounters:   01/13/21 (!) 327 lb (148.3 kg)   01/13/21 (!) 327 lb (148.3 kg)   12/15/20 (!) 329 lb (149.2 kg)                     Protein supplements: Pt. is currently using the following protein supplement Nectar and consuming the following grams of protein 65 plus. Rd / Ld reviewed with patient based on 1.0 gram per kg of IBW patient needs 75-85 grams of protein total daily.       Subjective:                   Current MNT:       Bariatric puree diet with MVI, Calcium & Protein Supplements     MNT Advanced to:     Bariatric soft diet with MVI, Calcium & Protein Supplements      Allergies and Food Allergies and Food Intolerances: Walnuts - allergy, Cantaloupe and Honeydew not tolerated well    Nutritional Data  No - Poor appetite more than 5 days     No - NPO or clear liquid more than 3 days     No - Problem Chewing      No - Problem Swallowing      No - Problem Mouth Pain      No - Problem Denture  No - Missing Teeth         No - Pressure Sore    No - Open Wound    No - Surgical Wound  No - Documented: Sepsis or Infection    No - Nausea  No - Vomiting    [facility] Bowel Protocol:  Patient states he / she has the following bowel movements per week 3-4  no - Diarrhea  No - Steatorrhea  Yes - Constipation  Mild constipation  When was your last bowel movement today  How much plain water are you drinking daily 64 oz or more  What other beverages / fluids are you drinking daily and the amount SF Silverio Aid  Yes - Are you taking Colace daily  What amount of Colace are you taking daily 3 Yes - Are you taking Sugar Free Chewable Fiber Gummies  What amount of Sugar Free Chewable Fiber Gummies are you taking daily 3  (suggested six daily)  Yes - Did your surgeon discuss constipation with you? No - Did your surgeon discuss increasing water intake? How much water were you instructed to take daily? 64 oz   Yes - Did your surgeon discuss continuing Colace? How much Colace did your surgeon instruct you to take? As currently doing  No - Did your surgeon discuss stopping Colace? No - Did your surgeon discuss starting Fiber Gummies? How much Fiber Gummies did your surgeon instruct you to take? n/a  Did your surgeon discuss any other medications / supplements to take daily to move your bowels and avoid constipation? no  No Patient was provided today the Constipation Handout  Yes Rd Ld reinforced pt needs to consume the following - Water Intake should be 64 oz , Fiber Chews prn, Dietary Fiber Intake 25 grams plus        No - Hair loss   No - Weight regain       No - Acid Reflux  No - Dumping Syndrome      No - Food gets stuck  No - Are you eating solids - should not be eating solids until 6 weeks post-op. no - Night Time Coughing  yes -  B Ping Noted    Not problematic  No - Are you chewing thoroughly  - Does not take effect until 6 weeks post-op  No - Are you hungry after eating     How many meals a day 6 / Portions Sizes of Meals are 3 oz          Labs on 1/11/21:  Note:  B1 and zinc pending,  Glucose 108H, prealbumin 15L, triglycerides 165H. Supplements: Bariatric Advantage Multi-Vitamin 1 tablet 2 times Daily, Bariatric Advantage 29 mgs Iron 1 tablet Daily, Bariatric Advanatge Calcium Lozenges 1 tablet 3 times Daily , Dry Vitamin D3 5,000iu every day    Estimated Daily Nutritional Needs: Based on Bariatric procedure for Wt.  Loss  Energy: Will be calculated at Maintenance Stage    Protein: 60  80 gms Daily MNT Plan and Additional Education: RD/LD reviewed Bariatric Soft Diet. Encouraged pt to meet protein and fluid needs daily. Pt. verbalized understanding. Handouts given. Pt. instructed to call with questions. MEDICAL NUTRITION THERAPY (MNT)  Nutrition Care Plan   (The patient has been educated and given written education material that reinforces the following dietary guidelines for Bariatric Surgery)  Goal:  Patient able to verbalize the following dietary concepts:  3 to 4 ounce portions per meal     6 small meals daily      60 to 80 grams of protein   48 to 64 ounces of fluid daily (water)     Always consume protein item first with all meals   Pt is aware wt loss is pt controlled. Slow Meals  30-35 chews per bite / Meals 30  45 minutes long            The RD / LD reviewed with the patient that the dietary goals of the bariatric patient is to ensure that patient is able to meet established nutritional needs for a Bariatric Surgery  Patient at this time in order to promote healing, prevent significant weight changes, prevent skin breakdown and abnormal lab values, prevent complications, and tolerance to diet and texture of foods. Pt is able to identify proper food choices and needed changes and is able to explain proper food preparation. RD / LD reviewed compliance with assigned diet stages, volume of food and drink consumed, timing of meals, amount of protein and energy intake, daily vitamin and mineral supplementation, identify food cravings and any adverse reactions associated with intake of food and drink. RD / LD uses behavioral tools such as goal setting, MI, and self-monitoring, to reinforce the anatomic and physiologic effects of the surgery, so that the described behaviors become more of a habit not simply a reaction to the altered anatomy.      Care Plan:   · Rd/Ld Addressed Food Records or 24-Hour Recall  · Rd / Ld encouraged patient to exercise at least 30 minutes daily · Rd / Ld instructed patient on how to increase oral protein intake within the diet. Pt. can verbalize he / she will need to consume 60 - 80 grams. · Rd / Ld instructed the patient on how to increase the use of protein supplements within the diet. · Pt. was instructed on how to increase water intake. Patient will need to consume 48 - 64 oz. of just plain water in addition to 30 oz. of non-caloric beverages. · Handouts given to patient  · RD / LD encouraged oral intake    · RD / LD encouraged pt. to keep food records.       · Pt. is able to verbalize diet concepts      No - Constipation Handout Given and Reviewed    No - High Fiber Handout Given and Reviewed      No - Ulcer Handout Given and Reviewed          No - Pt. was instructed to continue current MNT   Yes - Pt. diet was advanced to the following stage ( See above)   No - Supplements: initiated (See list below  - Pt. given instruction)     No - Supplemental foods:______________________  No - Pt. was placed on a altered meal schedule    Good - Dietary Compliance

## 2021-01-13 NOTE — PROGRESS NOTES
6 week LRYGB f/u, labs are in [facility] and is scheduled with Renzo for dietary. Water intake is 64 oz daily, having bowel movements, vitamin intake it good, getting protein through shakes and foods. 4/5 throughout/no strength deficits were identified

## 2021-01-14 LAB — ZINC: 61.7 UG/DL (ref 60–120)

## 2021-01-15 LAB — VITAMIN B1 WHOLE BLOOD: 158 NMOL/L (ref 70–180)

## 2021-01-23 ENCOUNTER — APPOINTMENT (OUTPATIENT)
Dept: CT IMAGING | Age: 41
DRG: 418 | End: 2021-01-23
Payer: MEDICARE

## 2021-01-23 ENCOUNTER — HOSPITAL ENCOUNTER (INPATIENT)
Age: 41
LOS: 1 days | Discharge: HOME | DRG: 418 | End: 2021-01-25
Attending: EMERGENCY MEDICINE | Admitting: SURGERY
Payer: MEDICARE

## 2021-01-23 ENCOUNTER — ANESTHESIA EVENT (OUTPATIENT)
Dept: OPERATING ROOM | Age: 41
DRG: 418 | End: 2021-01-23
Payer: MEDICARE

## 2021-01-23 ENCOUNTER — APPOINTMENT (OUTPATIENT)
Dept: GENERAL RADIOLOGY | Age: 41
DRG: 418 | End: 2021-01-23
Payer: MEDICARE

## 2021-01-23 ENCOUNTER — APPOINTMENT (OUTPATIENT)
Dept: ULTRASOUND IMAGING | Age: 41
DRG: 418 | End: 2021-01-23
Payer: MEDICARE

## 2021-01-23 DIAGNOSIS — G89.18 POST-OPERATIVE PAIN: ICD-10-CM

## 2021-01-23 DIAGNOSIS — R11.2 NAUSEA AND VOMITING, INTRACTABILITY OF VOMITING NOT SPECIFIED, UNSPECIFIED VOMITING TYPE: Primary | ICD-10-CM

## 2021-01-23 DIAGNOSIS — Z98.84 HX OF GASTRIC BYPASS: ICD-10-CM

## 2021-01-23 LAB
ABO/RH: NORMAL
ALBUMIN SERPL-MCNC: 4.3 G/DL (ref 3.5–5.2)
ALP BLD-CCNC: 93 U/L (ref 40–129)
ALT SERPL-CCNC: 20 U/L (ref 0–40)
ANION GAP SERPL CALCULATED.3IONS-SCNC: 12 MMOL/L (ref 7–16)
ANTIBODY SCREEN: NORMAL
AST SERPL-CCNC: 15 U/L (ref 0–39)
BASOPHILS ABSOLUTE: 0.05 E9/L (ref 0–0.2)
BASOPHILS RELATIVE PERCENT: 0.4 % (ref 0–2)
BILIRUB SERPL-MCNC: 0.4 MG/DL (ref 0–1.2)
BILIRUBIN URINE: NEGATIVE
BLOOD, URINE: NEGATIVE
BUN BLDV-MCNC: 10 MG/DL (ref 6–20)
CALCIUM SERPL-MCNC: 9.2 MG/DL (ref 8.6–10.2)
CHLORIDE BLD-SCNC: 103 MMOL/L (ref 98–107)
CLARITY: CLEAR
CO2: 26 MMOL/L (ref 22–29)
COLOR: YELLOW
CREAT SERPL-MCNC: 0.9 MG/DL (ref 0.7–1.2)
EKG ATRIAL RATE: 66 BPM
EKG P AXIS: 20 DEGREES
EKG P-R INTERVAL: 182 MS
EKG Q-T INTERVAL: 440 MS
EKG QRS DURATION: 104 MS
EKG QTC CALCULATION (BAZETT): 461 MS
EKG R AXIS: 40 DEGREES
EKG T AXIS: 26 DEGREES
EKG VENTRICULAR RATE: 66 BPM
EOSINOPHILS ABSOLUTE: 0.09 E9/L (ref 0.05–0.5)
EOSINOPHILS RELATIVE PERCENT: 0.8 % (ref 0–6)
GFR AFRICAN AMERICAN: >60
GFR NON-AFRICAN AMERICAN: >60 ML/MIN/1.73
GLUCOSE BLD-MCNC: 153 MG/DL (ref 74–99)
GLUCOSE URINE: NEGATIVE MG/DL
HCT VFR BLD CALC: 42.3 % (ref 37–54)
HEMOGLOBIN: 13.6 G/DL (ref 12.5–16.5)
IMMATURE GRANULOCYTES #: 0.05 E9/L
IMMATURE GRANULOCYTES %: 0.4 % (ref 0–5)
KETONES, URINE: 15 MG/DL
LACTIC ACID: 2.1 MMOL/L (ref 0.5–2.2)
LEUKOCYTE ESTERASE, URINE: NEGATIVE
LIPASE: 19 U/L (ref 13–60)
LYMPHOCYTES ABSOLUTE: 1.6 E9/L (ref 1.5–4)
LYMPHOCYTES RELATIVE PERCENT: 13.4 % (ref 20–42)
MCH RBC QN AUTO: 25.2 PG (ref 26–35)
MCHC RBC AUTO-ENTMCNC: 32.2 % (ref 32–34.5)
MCV RBC AUTO: 78.5 FL (ref 80–99.9)
MONOCYTES ABSOLUTE: 0.64 E9/L (ref 0.1–0.95)
MONOCYTES RELATIVE PERCENT: 5.4 % (ref 2–12)
NEUTROPHILS ABSOLUTE: 9.48 E9/L (ref 1.8–7.3)
NEUTROPHILS RELATIVE PERCENT: 79.6 % (ref 43–80)
NITRITE, URINE: NEGATIVE
PDW BLD-RTO: 14.6 FL (ref 11.5–15)
PH UA: 6 (ref 5–9)
PLATELET # BLD: 284 E9/L (ref 130–450)
PMV BLD AUTO: 10.7 FL (ref 7–12)
POTASSIUM SERPL-SCNC: 3.8 MMOL/L (ref 3.5–5)
PROTEIN UA: NEGATIVE MG/DL
RBC # BLD: 5.39 E12/L (ref 3.8–5.8)
SODIUM BLD-SCNC: 141 MMOL/L (ref 132–146)
SPECIFIC GRAVITY UA: >=1.03 (ref 1–1.03)
TOTAL PROTEIN: 7.8 G/DL (ref 6.4–8.3)
TROPONIN: <0.01 NG/ML (ref 0–0.03)
UROBILINOGEN, URINE: 0.2 E.U./DL
WBC # BLD: 11.9 E9/L (ref 4.5–11.5)

## 2021-01-23 PROCEDURE — 83605 ASSAY OF LACTIC ACID: CPT

## 2021-01-23 PROCEDURE — 93005 ELECTROCARDIOGRAM TRACING: CPT | Performed by: EMERGENCY MEDICINE

## 2021-01-23 PROCEDURE — 86901 BLOOD TYPING SEROLOGIC RH(D): CPT

## 2021-01-23 PROCEDURE — 71045 X-RAY EXAM CHEST 1 VIEW: CPT

## 2021-01-23 PROCEDURE — G0378 HOSPITAL OBSERVATION PER HR: HCPCS

## 2021-01-23 PROCEDURE — 2580000003 HC RX 258: Performed by: SURGERY

## 2021-01-23 PROCEDURE — 2500000003 HC RX 250 WO HCPCS: Performed by: SURGERY

## 2021-01-23 PROCEDURE — 96365 THER/PROPH/DIAG IV INF INIT: CPT

## 2021-01-23 PROCEDURE — 83690 ASSAY OF LIPASE: CPT

## 2021-01-23 PROCEDURE — 96372 THER/PROPH/DIAG INJ SC/IM: CPT

## 2021-01-23 PROCEDURE — 99284 EMERGENCY DEPT VISIT MOD MDM: CPT

## 2021-01-23 PROCEDURE — 86850 RBC ANTIBODY SCREEN: CPT

## 2021-01-23 PROCEDURE — 6360000002 HC RX W HCPCS: Performed by: STUDENT IN AN ORGANIZED HEALTH CARE EDUCATION/TRAINING PROGRAM

## 2021-01-23 PROCEDURE — 81003 URINALYSIS AUTO W/O SCOPE: CPT

## 2021-01-23 PROCEDURE — 6360000002 HC RX W HCPCS: Performed by: EMERGENCY MEDICINE

## 2021-01-23 PROCEDURE — 2580000003 HC RX 258: Performed by: STUDENT IN AN ORGANIZED HEALTH CARE EDUCATION/TRAINING PROGRAM

## 2021-01-23 PROCEDURE — 6360000004 HC RX CONTRAST MEDICATION: Performed by: RADIOLOGY

## 2021-01-23 PROCEDURE — 6370000000 HC RX 637 (ALT 250 FOR IP): Performed by: SURGERY

## 2021-01-23 PROCEDURE — 74177 CT ABD & PELVIS W/CONTRAST: CPT

## 2021-01-23 PROCEDURE — 86900 BLOOD TYPING SEROLOGIC ABO: CPT

## 2021-01-23 PROCEDURE — 84484 ASSAY OF TROPONIN QUANT: CPT

## 2021-01-23 PROCEDURE — 2580000003 HC RX 258: Performed by: EMERGENCY MEDICINE

## 2021-01-23 PROCEDURE — 85025 COMPLETE CBC W/AUTO DIFF WBC: CPT

## 2021-01-23 PROCEDURE — 6370000000 HC RX 637 (ALT 250 FOR IP): Performed by: STUDENT IN AN ORGANIZED HEALTH CARE EDUCATION/TRAINING PROGRAM

## 2021-01-23 PROCEDURE — 96375 TX/PRO/DX INJ NEW DRUG ADDON: CPT

## 2021-01-23 PROCEDURE — 96374 THER/PROPH/DIAG INJ IV PUSH: CPT

## 2021-01-23 PROCEDURE — 80053 COMPREHEN METABOLIC PANEL: CPT

## 2021-01-23 PROCEDURE — 96366 THER/PROPH/DIAG IV INF ADDON: CPT

## 2021-01-23 PROCEDURE — 96376 TX/PRO/DX INJ SAME DRUG ADON: CPT

## 2021-01-23 PROCEDURE — 99219 PR INITIAL OBSERVATION CARE/DAY 50 MINUTES: CPT | Performed by: SURGERY

## 2021-01-23 PROCEDURE — 6360000002 HC RX W HCPCS: Performed by: SURGERY

## 2021-01-23 PROCEDURE — 76705 ECHO EXAM OF ABDOMEN: CPT

## 2021-01-23 PROCEDURE — 93010 ELECTROCARDIOGRAM REPORT: CPT | Performed by: INTERNAL MEDICINE

## 2021-01-23 PROCEDURE — 96361 HYDRATE IV INFUSION ADD-ON: CPT

## 2021-01-23 PROCEDURE — 36415 COLL VENOUS BLD VENIPUNCTURE: CPT

## 2021-01-23 RX ORDER — OXYCODONE HCL 5 MG/5 ML
5 SOLUTION, ORAL ORAL ONCE
Status: COMPLETED | OUTPATIENT
Start: 2021-01-23 | End: 2021-01-23

## 2021-01-23 RX ORDER — ACETAMINOPHEN 325 MG/1
650 TABLET ORAL EVERY 4 HOURS PRN
Status: DISCONTINUED | OUTPATIENT
Start: 2021-01-23 | End: 2021-01-25 | Stop reason: HOSPADM

## 2021-01-23 RX ORDER — FENTANYL CITRATE 50 UG/ML
50 INJECTION, SOLUTION INTRAMUSCULAR; INTRAVENOUS ONCE
Status: COMPLETED | OUTPATIENT
Start: 2021-01-23 | End: 2021-01-23

## 2021-01-23 RX ORDER — PROMETHAZINE HYDROCHLORIDE 25 MG/ML
12.5 INJECTION, SOLUTION INTRAMUSCULAR; INTRAVENOUS ONCE
Status: COMPLETED | OUTPATIENT
Start: 2021-01-23 | End: 2021-01-23

## 2021-01-23 RX ORDER — SODIUM CHLORIDE 0.9 % (FLUSH) 0.9 %
10 SYRINGE (ML) INJECTION EVERY 12 HOURS SCHEDULED
Status: DISCONTINUED | OUTPATIENT
Start: 2021-01-23 | End: 2021-01-25 | Stop reason: HOSPADM

## 2021-01-23 RX ORDER — PROMETHAZINE HYDROCHLORIDE 25 MG/1
12.5 TABLET ORAL EVERY 6 HOURS PRN
Status: DISCONTINUED | OUTPATIENT
Start: 2021-01-23 | End: 2021-01-25 | Stop reason: HOSPADM

## 2021-01-23 RX ORDER — ONDANSETRON 2 MG/ML
4 INJECTION INTRAMUSCULAR; INTRAVENOUS EVERY 6 HOURS PRN
Status: DISCONTINUED | OUTPATIENT
Start: 2021-01-23 | End: 2021-01-25 | Stop reason: HOSPADM

## 2021-01-23 RX ORDER — PANTOPRAZOLE SODIUM 40 MG/1
40 TABLET, DELAYED RELEASE ORAL
Status: DISCONTINUED | OUTPATIENT
Start: 2021-01-23 | End: 2021-01-25 | Stop reason: HOSPADM

## 2021-01-23 RX ORDER — ONDANSETRON 2 MG/ML
4 INJECTION INTRAMUSCULAR; INTRAVENOUS ONCE
Status: DISCONTINUED | OUTPATIENT
Start: 2021-01-23 | End: 2021-01-23

## 2021-01-23 RX ORDER — SODIUM CHLORIDE 0.9 % (FLUSH) 0.9 %
10 SYRINGE (ML) INJECTION PRN
Status: DISCONTINUED | OUTPATIENT
Start: 2021-01-23 | End: 2021-01-25 | Stop reason: HOSPADM

## 2021-01-23 RX ORDER — SODIUM CHLORIDE 9 MG/ML
INJECTION, SOLUTION INTRAVENOUS CONTINUOUS
Status: DISCONTINUED | OUTPATIENT
Start: 2021-01-23 | End: 2021-01-25

## 2021-01-23 RX ORDER — OXYCODONE HYDROCHLORIDE 10 MG/1
10 TABLET ORAL EVERY 4 HOURS PRN
Status: DISCONTINUED | OUTPATIENT
Start: 2021-01-23 | End: 2021-01-25 | Stop reason: HOSPADM

## 2021-01-23 RX ORDER — SODIUM CHLORIDE 0.9 % (FLUSH) 0.9 %
10 SYRINGE (ML) INJECTION PRN
Status: DISCONTINUED | OUTPATIENT
Start: 2021-01-23 | End: 2021-01-23 | Stop reason: SDUPTHER

## 2021-01-23 RX ORDER — SODIUM CHLORIDE, SODIUM LACTATE, POTASSIUM CHLORIDE, CALCIUM CHLORIDE 600; 310; 30; 20 MG/100ML; MG/100ML; MG/100ML; MG/100ML
INJECTION, SOLUTION INTRAVENOUS CONTINUOUS
Status: DISCONTINUED | OUTPATIENT
Start: 2021-01-23 | End: 2021-01-25 | Stop reason: HOSPADM

## 2021-01-23 RX ORDER — OXYCODONE HYDROCHLORIDE 5 MG/1
5 TABLET ORAL EVERY 4 HOURS PRN
Status: DISCONTINUED | OUTPATIENT
Start: 2021-01-23 | End: 2021-01-25 | Stop reason: HOSPADM

## 2021-01-23 RX ADMIN — ONDANSETRON 4 MG: 2 INJECTION INTRAMUSCULAR; INTRAVENOUS at 09:10

## 2021-01-23 RX ADMIN — TRIMETHOBENZAMIDE HYDROCHLORIDE 200 MG: 100 INJECTION INTRAMUSCULAR at 04:49

## 2021-01-23 RX ADMIN — SODIUM CHLORIDE, PRESERVATIVE FREE 10 ML: 5 INJECTION INTRAVENOUS at 08:16

## 2021-01-23 RX ADMIN — PROMETHAZINE HYDROCHLORIDE 12.5 MG: 25 INJECTION INTRAMUSCULAR; INTRAVENOUS at 01:57

## 2021-01-23 RX ADMIN — FENTANYL CITRATE 50 MCG: 50 INJECTION, SOLUTION INTRAMUSCULAR; INTRAVENOUS at 01:57

## 2021-01-23 RX ADMIN — SODIUM CHLORIDE, POTASSIUM CHLORIDE, SODIUM LACTATE AND CALCIUM CHLORIDE: 600; 310; 30; 20 INJECTION, SOLUTION INTRAVENOUS at 08:16

## 2021-01-23 RX ADMIN — WATER 1000 MG: 1 INJECTION INTRAMUSCULAR; INTRAVENOUS; SUBCUTANEOUS at 11:44

## 2021-01-23 RX ADMIN — SODIUM CHLORIDE: 9 INJECTION, SOLUTION INTRAVENOUS at 04:49

## 2021-01-23 RX ADMIN — ONDANSETRON 4 MG: 2 INJECTION INTRAMUSCULAR; INTRAVENOUS at 23:21

## 2021-01-23 RX ADMIN — OXYCODONE HYDROCHLORIDE 5 MG: 5 SOLUTION ORAL at 05:49

## 2021-01-23 RX ADMIN — METRONIDAZOLE 500 MG: 500 INJECTION, SOLUTION INTRAVENOUS at 11:44

## 2021-01-23 RX ADMIN — ONDANSETRON 4 MG: 2 INJECTION INTRAMUSCULAR; INTRAVENOUS at 17:12

## 2021-01-23 RX ADMIN — SODIUM CHLORIDE, POTASSIUM CHLORIDE, SODIUM LACTATE AND CALCIUM CHLORIDE: 600; 310; 30; 20 INJECTION, SOLUTION INTRAVENOUS at 18:44

## 2021-01-23 RX ADMIN — METRONIDAZOLE 500 MG: 500 INJECTION, SOLUTION INTRAVENOUS at 18:44

## 2021-01-23 RX ADMIN — PANTOPRAZOLE SODIUM 40 MG: 40 TABLET, DELAYED RELEASE ORAL at 08:16

## 2021-01-23 RX ADMIN — IOPAMIDOL 90 ML: 755 INJECTION, SOLUTION INTRAVENOUS at 04:07

## 2021-01-23 RX ADMIN — OXYCODONE HYDROCHLORIDE 10 MG: 10 TABLET ORAL at 21:39

## 2021-01-23 RX ADMIN — OXYCODONE HYDROCHLORIDE 10 MG: 10 TABLET ORAL at 17:12

## 2021-01-23 RX ADMIN — SODIUM CHLORIDE: 9 INJECTION, SOLUTION INTRAVENOUS at 01:59

## 2021-01-23 RX ADMIN — OXYCODONE HYDROCHLORIDE 10 MG: 10 TABLET ORAL at 12:25

## 2021-01-23 ASSESSMENT — PAIN DESCRIPTION - DESCRIPTORS: DESCRIPTORS: ACHING;CONSTANT;DISCOMFORT

## 2021-01-23 ASSESSMENT — LIFESTYLE VARIABLES: SMOKING_STATUS: 0

## 2021-01-23 ASSESSMENT — PAIN - FUNCTIONAL ASSESSMENT: PAIN_FUNCTIONAL_ASSESSMENT: ACTIVITIES ARE NOT PREVENTED

## 2021-01-23 ASSESSMENT — PAIN DESCRIPTION - ONSET
ONSET: ON-GOING
ONSET: GRADUAL

## 2021-01-23 ASSESSMENT — PAIN DESCRIPTION - ORIENTATION
ORIENTATION: RIGHT;UPPER
ORIENTATION: RIGHT
ORIENTATION: RIGHT;UPPER

## 2021-01-23 ASSESSMENT — PAIN DESCRIPTION - FREQUENCY: FREQUENCY: CONTINUOUS

## 2021-01-23 ASSESSMENT — PAIN SCALES - GENERAL: PAINLEVEL_OUTOF10: 7

## 2021-01-23 NOTE — ED PROVIDER NOTES
Department of Emergency Medicine   ED  Provider Note  Admit Date/RoomTime: 1/23/2021  1:35 AM  ED Room: 01/01          History of Present Illness:  1/23/21, Time: 4:37 AM EST  Chief Complaint   Patient presents with    Emesis     (+N, abdominal pain, x2 hours, gastric bypass Nov 16, 2020)                Marco Rowe is a 35 y.o. male presenting to the ED for vomiting. Patient does have a history of a gastric bypass surgery. He says about 2 hours ago he had a sudden onset of right upper abdominal pain, aching in nature, does radiate to his back, did awaken him from sleep. Has not had this before. Nothing makes it better or worse. He describes a clear emesis. He tried Zofran at home with no relief. Denies any specific fever, chills, chest, weakness, cough, sputum, nausea, vomit, paresthesias, chest pain, shortness of breath, lethargy, or any other symptoms or complaints. Review of Systems:   Pertinent positives and negatives are stated within HPI, all other systems reviewed and are negative.        --------------------------------------------- PAST HISTORY ---------------------------------------------  Past Medical History:  has a past medical history of Benign essential HTN, Depression, Diabetes mellitus type 2 in obese ([facility] [address] GERD without esophagitis, Hyperlipemia, Latex allergy, Morbid obesity due to excess calories ([facility] [address] WILL on CPAP, and Sleep apnea. Past Surgical History:  has a past surgical history that includes Upper gastrointestinal endoscopy (N/A, 6/19/2020) and Sleeve Gastrectomy (N/A, 11/16/2020). Social History:  reports that he has never smoked. He has never used smokeless tobacco. He reports previous alcohol use. He reports that he does not use drugs. Family History: family history includes Diabetes in his mother; Heart Attack in his maternal grandfather and another family member; No Known Problems in his brother; Thyroid Disease in his brother and mother; Tuberculosis in his father; Uterine Cancer in his mother. . Unless otherwise noted, family history is non contributory    The patients home medications have been reviewed. Allergies: Latex, Food, and Seasonal        ---------------------------------------------------PHYSICAL EXAM--------------------------------------    Constitutional/General: Alert and oriented x3  Head: Normocephalic and atraumatic  Eyes: PERRL, EOMI, sclera non icteric  Mouth: Oropharynx clear, handling secretions, no trismus, no asymmetry of the posterior oropharynx or uvular edema  Neck: Supple, full ROM, no stridor, no meningeal signs  Respiratory: Lungs clear to auscultation bilaterally, no wheezes, rales, or rhonchi. Not in respiratory distress  Cardiovascular:  Regular tachycardia. Regular rhythm. 2+ distal pulses. Equal extremity pulses. Chest: No chest wall tenderness  GI:  Abdomen Soft, with right upper quadrant tenderness, no guarding or rebound, bowel sounds normal   Musculoskeletal: Moves all extremities x 4. Warm and well perfused, no clubbing, cyanosis, or edema. Capillary refill <3 seconds  Integument: skin warm and dry. No rashes. Neurologic: GCS 15, no focal deficits, symmetric strength 5/5 in the upper and lower extremities bilaterally  Psychiatric: Normal Affect          -------------------------------------------------- RESULTS -------------------------------------------------  I have personally reviewed all laboratory and imaging results for this patient. Results are listed below.      LABS: (Lab results interpreted by me)  Results for orders placed or performed during the hospital encounter of 01/23/21   CBC Auto Differential   Result Value Ref Range    WBC 11.9 (H) 4.5 - 11.5 E9/L    RBC 5.39 3.80 - 5.80 E12/L Hemoglobin 13.6 12.5 - 16.5 g/dL    Hematocrit 42.3 37.0 - 54.0 %    MCV 78.5 (L) 80.0 - 99.9 fL    MCH 25.2 (L) 26.0 - 35.0 pg    MCHC 32.2 32.0 - 34.5 %    RDW 14.6 11.5 - 15.0 fL    Platelets 628 539 - 282 E9/L    MPV 10.7 7.0 - 12.0 fL    Neutrophils % 79.6 43.0 - 80.0 %    Immature Granulocytes % 0.4 0.0 - 5.0 %    Lymphocytes % 13.4 (L) 20.0 - 42.0 %    Monocytes % 5.4 2.0 - 12.0 %    Eosinophils % 0.8 0.0 - 6.0 %    Basophils % 0.4 0.0 - 2.0 %    Neutrophils Absolute 9.48 (H) 1.80 - 7.30 E9/L    Immature Granulocytes # 0.05 E9/L    Lymphocytes Absolute 1.60 1.50 - 4.00 E9/L    Monocytes Absolute 0.64 0.10 - 0.95 E9/L    Eosinophils Absolute 0.09 0.05 - 0.50 E9/L    Basophils Absolute 0.05 0.00 - 0.20 E9/L   Comprehensive Metabolic Panel   Result Value Ref Range    Sodium 141 132 - 146 mmol/L    Potassium 3.8 3.5 - 5.0 mmol/L    Chloride 103 98 - 107 mmol/L    CO2 26 22 - 29 mmol/L    Anion Gap 12 7 - 16 mmol/L    Glucose 153 (H) 74 - 99 mg/dL    BUN 10 6 - 20 mg/dL    CREATININE 0.9 0.7 - 1.2 mg/dL    GFR Non-African American >60 >=60 mL/min/1.73    GFR African American >60     Calcium 9.2 8.6 - 10.2 mg/dL    Total Protein 7.8 6.4 - 8.3 g/dL    Alb 4.3 3.5 - 5.2 g/dL    Total Bilirubin 0.4 0.0 - 1.2 mg/dL    Alkaline Phosphatase 93 40 - 129 U/L    ALT 20 0 - 40 U/L    AST 15 0 - 39 U/L   Troponin   Result Value Ref Range    Troponin <0.01 0.00 - 0.03 ng/mL   Lipase   Result Value Ref Range    Lipase 19 13 - 60 U/L   EKG 12 Lead   Result Value Ref Range    Ventricular Rate 66 BPM    Atrial Rate 66 BPM    P-R Interval 182 ms    QRS Duration 104 ms    Q-T Interval 440 ms    QTc Calculation (Bazett) 461 ms    P Axis 20 degrees    R Axis 40 degrees    T Axis 26 degrees   ,       RADIOLOGY:  Interpreted by Radiologist unless otherwise specified  CT ABDOMEN PELVIS W IV CONTRAST Additional Contrast? None   Final Result   1. Splenomegaly measuring about 16 cm.   2. Otherwise no acute abnormality seen. XR CHEST PORTABLE   Final Result   There is no acute abnormality seen. EKG Interpretation  Interpreted by emergency department physician, Dr. Arnoldo Burgess, rate 66, no STEMI        ------------------------- NURSING NOTES AND VITALS REVIEWED ---------------------------   The nursing notes within the ED encounter and vital signs as below have been reviewed by myself  BP (!) 185/95   Pulse 69   Temp 97.5 °F (36.4 °C) (Temporal)   Resp 17   Ht 6' (1.829 m)   Wt (!) 325 lb (147.4 kg)   SpO2 96%   BMI 44.08 kg/m²     Oxygen Saturation Interpretation: Normal    The patients available past medical records and past encounters were reviewed. ------------------------------ ED COURSE/MEDICAL DECISION MAKING----------------------  Medications   0.9 % sodium chloride infusion ( Intravenous New Bag 1/23/21 0159)   sodium chloride flush 0.9 % injection 10 mL (has no administration in time range)   trimethobenzamide (TIGAN) injection 200 mg (has no administration in time range)   fentaNYL (SUBLIMAZE) injection 50 mcg (50 mcg Intravenous Given 1/23/21 0157)   promethazine (PHENERGAN) injection 12.5 mg (12.5 mg Intramuscular Given 1/23/21 0157)   iopamidol (ISOVUE-370) 76 % injection 90 mL (90 mLs Intravenous Given 1/23/21 0407)           The cardiac monitor revealed sinus with a heart rate in the 60s as interpreted by me. The cardiac monitor was ordered secondary to the patient's vomiting and to monitor the patient for dysrhythmia. [facility] [id number]         Medical Decision Making:    Patient received IV fluids. Labs and imaging reviewed. On reevaluation, patient's resting, he continues to vomit. Additional antiemetics were provided. Given his intractable vomiting, along with his recent gastric bypass, surgical be consulted.     Counseling:

## 2021-01-23 NOTE — ANESTHESIA PRE PROCEDURE
Department of Anesthesiology  Preprocedure Note       Name:  Marco Rowe   Age:  35 y.o.  :  1980                                          MRN:  [id number]         Date:  2021      Surgeon: Pramod Tobias):  Briseyda Piedra MD    Procedure: Procedure(s):  CHOLECYSTECTOMY LAPAROSCOPIC possible gram possible open    Medications prior to admission:   Prior to Admission medications    Medication Sig Start Date End Date Taking? Authorizing Provider   docusate sodium (COLACE) 100 MG capsule Take 100 mg by mouth 3 times daily as needed for Constipation    Historical Provider, MD   sertraline (ZOLOFT) 50 MG tablet Take 1 tablet by mouth nightly 12/15/20   Moise Pfeiffer MD   ondansetron (ZOFRAN-ODT) 4 MG disintegrating tablet Take 1 tablet by mouth every 8 hours as needed for Nausea or Vomiting 12/15/20   Moise Pfeiffer MD   Multiple Vitamin (MVI, BARIATRIC ADVANTAGE MULTI-FORMULA, CHEW TAB) Take 1 tablet by mouth daily    Historical Provider, MD   Calcium Carbonate (CALCI-CHEW PO) Take 1 tablet by mouth 3 times daily    Historical Provider, MD   Ferrous Sulfate (IRON PO) Take 1 tablet by mouth daily    Historical Provider, MD   Cholecalciferol (VITAMIN D3) 125 MCG (5000 UT) TABS Take 1 tablet by mouth daily    Historical Provider, MD   omeprazole (PRILOSEC) 20 MG delayed release capsule Take 1 capsule by mouth daily 10/21/20 10/21/21  Radha Weston MD   blood glucose monitor strips Accucheck meter-pt check BS daily. 9/3/20   Moise Pfeiffer MD   Lancets MISC Accucheck or what insurance covers. 9/3/20   Moise Pfeiffer MD   Misc. Devices MISC Give one Accucheck glucometer monitor or whatever the insurance approves.  17   Gilbert Martinez MD   LORATADINE PO Take 10 mg by mouth as needed    Historical Provider, MD       Current medications:    Current Facility-Administered Medications   Medication Dose Route Frequency Provider Last Rate Last Admin  0.9 % sodium chloride infusion   Intravenous Continuous Ant Carbajal  mL/hr at 01/23/21 0449 New Bag at 01/23/21 0449    sodium chloride flush 0.9 % injection 10 mL  10 mL Intravenous 2 times per day Gianmarino C Gianfrate, DO   10 mL at 01/23/21 0816    sodium chloride flush 0.9 % injection 10 mL  10 mL Intravenous PRN Gianmarino C Gianfrate, DO        acetaminophen (TYLENOL) tablet 650 mg  650 mg Oral Q4H PRN Gianmarino C Gianfrate, DO        magnesium hydroxide (MILK OF MAGNESIA) 400 MG/5ML suspension 30 mL  30 mL Oral Daily PRN Gianmarino C Gianfrate, DO        promethazine (PHENERGAN) tablet 12.5 mg  12.5 mg Oral Q6H PRN Gianmarino C Gianfrate, DO        Or    ondansetron (ZOFRAN) injection 4 mg  4 mg Intravenous Q6H PRN Gianmarino C Gianfrate, DO   4 mg at 01/23/21 1712    trimethobenzamide (TIGAN) injection 200 mg  200 mg Intramuscular Q6H PRN Gianmarino C Gianfrate, DO        lactated ringers infusion   Intravenous Continuous Gianmarino C Gianfrate,  mL/hr at 01/23/21 0816 New Bag at 01/23/21 0816    pantoprazole (PROTONIX) tablet 40 mg  40 mg Oral QAM AC Gianmarino C Gianfrate, DO   40 mg at 01/23/21 0816    cefTRIAXone (ROCEPHIN) 1,000 mg in sterile water 10 mL IV syringe  1,000 mg Intravenous Q24H Angelika Richter MD   1,000 mg at 01/23/21 1144    metronidazole (FLAGYL) 500 mg in NaCl 100 mL IVPB premix  500 mg Intravenous Q8H Angelika Richter MD   Stopped at 01/23/21 1244    oxyCODONE (ROXICODONE) immediate release tablet 5 mg  5 mg Oral Q4H PRN Christina Navarro MD        Or   Bernice Officer oxyCODONE HCl (OXY-IR) immediate release tablet 10 mg  10 mg Oral Q4H PRN Christina Navarro MD   10 mg at 01/23/21 1712       Allergies: Allergies   Allergen Reactions    Latex Rash    Food Other (See Comments)     Walnuts - Mouth gets sores and pt becomes dry.      Seasonal      Takes Claritin        Problem List:    Patient Active Problem List   Diagnosis Code  Recurrent major depressive disorder (HCC) F33.9    WILL on CPAP G47.33, Z99.89    Diabetes mellitus type 2 in obese ([facility]) E11.69, E66.9    Benign essential HTN I10    Depression F32.9    ALVINO (generalized anxiety disorder) F41.1    Morbid obesity with BMI of 50.0-59.9, adult ([facility]) E66.01, Z68.43    Gastroesophageal reflux disease without esophagitis K21.9    S/P laparoscopic sleeve gastrectomy Z98.84    Intractable nausea and vomiting R11.2       Past Medical History:        Diagnosis Date    Benign essential HTN     Depression     Diabetes mellitus type 2 in obese ([facility] [address]     GERD without esophagitis     Hyperlipemia     Latex allergy     Morbid obesity due to excess calories (HCC)     WILL on CPAP     10    Sleep apnea     setting 9-10       Past Surgical History:        Procedure Laterality Date    SLEEVE GASTRECTOMY N/A 11/16/2020    GASTRECTOMY SLEEVE LAPAROSCOPIC performed by Radha Weston MD at [address] N/A 6/19/2020    EGD BIOPSY performed by Radha Weston MD at [address] History:    Social History     Tobacco Use    Smoking status: Never Smoker    Smokeless tobacco: Never Used   Substance Use Topics    Alcohol use: Not Currently                                Counseling given: Not Answered      Vital Signs (Current):   Vitals:    01/23/21 0300 01/23/21 0530 01/23/21 0645 01/23/21 0800   BP: (!) 185/95 (!) 172/85 (!) 157/83 (!) 145/83   Pulse: 69 70 66 71   Resp: 17 18 18 16   Temp:  36.7 °C (98.1 °F) 36.7 °C (98.1 °F) 36.7 °C (98 °F)   TempSrc:   Oral Oral   SpO2: 96% 99%  97%   Weight:       Height:                                                  BP Readings from Last 3 Encounters:   01/23/21 (!) 145/83   01/13/21 (!) 157/75   12/15/20 124/65       NPO Status:                           NPO midnight DOS                                                      BMI:   Wt Readings from Last 3 Encounters: 01/23/21 (!) 325 lb (147.4 kg)   01/13/21 (!) 327 lb (148.3 kg)   01/13/21 (!) 327 lb (148.3 kg)     Body mass index is 44.08 kg/m². CBC:   Lab Results   Component Value Date    WBC 11.9 01/23/2021    RBC 5.39 01/23/2021    HGB 13.6 01/23/2021    HCT 42.3 01/23/2021    MCV 78.5 01/23/2021    RDW 14.6 01/23/2021     01/23/2021       CMP:   Lab Results   Component Value Date     01/23/2021    K 3.8 01/23/2021     01/23/2021    CO2 26 01/23/2021    BUN 10 01/23/2021    CREATININE 0.9 01/23/2021    GFRAA >60 01/23/2021    LABGLOM >60 01/23/2021    GLUCOSE 153 01/23/2021    PROT 7.8 01/23/2021    CALCIUM 9.2 01/23/2021    BILITOT 0.4 01/23/2021    ALKPHOS 93 01/23/2021    AST 15 01/23/2021    ALT 20 01/23/2021       POC Tests: No results for input(s): POCGLU, POCNA, POCK, POCCL, POCBUN, POCHEMO, POCHCT in the last 72 hours. Coags: No results found for: PROTIME, INR, APTT    HCG (If Applicable): No results found for: PREGTESTUR, PREGSERUM, HCG, HCGQUANT     ABGs: No results found for: PHART, PO2ART, FPJ8YVC, NJK6TZJ, BEART, S3VWMVMO     Type & Screen (If Applicable):  No results found for: LABABO, LABRH    Drug/Infectious Status (If Applicable):  No results found for: HIV, HEPCAB    COVID-19 Screening (If Applicable):   Lab Results   Component Value Date    COVID19 Not Detected 11/09/2020     EKG 1/23/21  Narrative & Impression    Normal sinus rhythm  Normal ECG  No previous ECGs available  Confirmed by Zara Mariano ([id number]) on 1/23/2021 7:30:55 AM     NM Cardiac Stress Test 8/11/20  Impression   1. No reversible perfusion defect   2. Ejection fraction is 60 %.    3. No significant wall motion abnormality         Anesthesia Evaluation  Patient summary reviewed no history of anesthetic complications:   Airway: Mallampati: I  TM distance: <3 FB   Neck ROM: full  Mouth opening: > = 3 FB Dental: normal exam         Pulmonary: breath sounds clear to auscultation  (+) sleep apnea: on CPAP, (-) not a current smoker                           Cardiovascular:  Exercise tolerance: good (>4 METS),   (+) hypertension:, hyperlipidemia      ECG reviewed  Rhythm: regular  Rate: normal    Stress test reviewed       Beta Blocker:  Not on Beta Blocker         Neuro/Psych:   (+) psychiatric history:depression/anxiety             GI/Hepatic/Renal:   (+) GERD:, morbid obesity          Endo/Other:    (+) DiabetesType II DM, well controlled, , .                 Abdominal:           Vascular: negative vascular ROS. Anesthesia Plan      general     ASA 3       Induction: intravenous. BIS  MIPS: Postoperative opioids intended, Prophylactic antiemetics administered and Postoperative trial extubation. Anesthetic plan and risks discussed with patient. Use of blood products discussed with patient whom consented to blood products. Plan discussed with CRNA and attending.                   Leo Veras RN   1/23/2021

## 2021-01-23 NOTE — H&P
GENERAL SURGERY  HISTORY AND PHYSICAL  1/23/2021    Chief Complaint   Patient presents with    Emesis     (+N, abdominal pain, x2 hours, gastric bypass Nov 16, 2020)       NATALY Mahan is a 35 y.o. male who presents for evaluation of intractable nausea and vomiting. Pt states that the RUQ pain woke him up from sleep and then has not been able to stop vomiting since noon yesterday. He took Zofran without any help. He presented into the ED and got phenergan with out any help. He is recent post-op from sleeve gastrectomy and hernia repair on 11/16/20. He states that he hasn't made any changes to his diet even tough he was told he could introduce more solids into his diet. On exam he is soft, non-tender and non-distended on exam. Got some Tigan and it seems that it improved       Past Medical History:   Diagnosis Date    Benign essential HTN     Depression     Diabetes mellitus type 2 in obese ([address]     GERD without esophagitis     Hyperlipemia     Latex allergy     Morbid obesity due to excess calories ([address]     WILL on CPAP     10    Sleep apnea     setting 9-10       Past Surgical History:   Procedure Laterality Date    SLEEVE GASTRECTOMY N/A 11/16/2020    GASTRECTOMY SLEEVE LAPAROSCOPIC performed by Caitlin Antonio MD at [address] [facility] [address] 6/19/2020    EGD BIOPSY performed by Caitlin Antonio MD at [facility]       Prior to Admission medications    Medication Sig Start Date End Date Taking?  Authorizing Provider   docusate sodium (COLACE) 100 MG capsule Take 100 mg by mouth 3 times daily as needed for Constipation    Historical Provider, MD   sertraline (ZOLOFT) 50 MG tablet Take 1 tablet by mouth nightly 12/15/20   Soco Betancourt MD   ondansetron (ZOFRAN-ODT) 4 MG disintegrating tablet Take 1 tablet by mouth every 8 hours as needed for Nausea or Vomiting 12/15/20   Soco Betancourt MD Gastrointestinal ROS: nausea and RUQ pain  Genito-Urinary ROS: no dysuria, trouble voiding, or hematuria  Musculoskeletal ROS: negative  Neurological ROS: negative  Dermatological ROS: negative      PHYSICAL EXAM:    Vitals:    01/23/21 0300   BP: (!) 185/95   Pulse: 69   Resp: 17   Temp:    SpO2: 96%       General Appearance:  awake, alert, oriented, in mild acute distress  Skin:  Skin color, texture, turgor normal. No rashes or lesions. Head/face:  NCAT  Eyes:  PERRL  Lungs:  No chest wall tenderness. Heart:  Heart regular rate and rhythm  Abdomen:  Soft, non-tender, normal bowel sounds. Well healed incisions from previous surgery   Extremities: pulses present in all extremities  Neurologic:  negative      LABS:  CBC  Recent Labs     01/23/21  0155   WBC 11.9*   HGB 13.6   HCT 42.3        BMP  Recent Labs     01/23/21  0155      K 3.8      CO2 26   BUN 10   CREATININE 0.9   CALCIUM 9.2     Liver Function  Recent Labs     01/23/21  0155   LIPASE 19   BILITOT 0.4   AST 15   ALT 20   ALKPHOS 93   PROT 7.8   LABALBU 4.3     No results for input(s): LACTATE in the last 72 hours. No results for input(s): INR, PTT in the last 72 hours.     Invalid input(s): PT    RADIOLOGY    Ct Abdomen Pelvis W Iv Contrast Additional Contrast? None    Result Date: 1/23/2021 EXAMINATION: CT OF THE ABDOMEN AND PELVIS WITH CONTRAST 1/23/2021 4:06 am TECHNIQUE: CT of the abdomen and pelvis was performed with the administration of intravenous contrast. Multiplanar reformatted images are provided for review. Dose modulation, iterative reconstruction, and/or weight based adjustment of the mA/kV was utilized to reduce the radiation dose to as low as reasonably achievable. COMPARISON: None. HISTORY: ORDERING SYSTEM PROVIDED HISTORY: right flank pain TECHNOLOGIST PROVIDED HISTORY: Additional Contrast?->None Reason for exam:->right flank pain What reading provider will be dictating this exam?->CRC FINDINGS: Lower Chest: The visualized portions of the lung bases are clear. Organs: Within the limitations of a noncontrast exam, the visualized liver, is pancreas, adrenal glands demonstrate no significant abnormality. There is nonspecific splenomegaly measuring 16 cm. There is a subcentimeter low-density lesion in each kidney which are too small to characterize but although represented tiny cysts. There is no nephrolithiasis, hydronephrosis, hydroureter or other evidence for acute obstructive uropathy. GI/Bowel: There are no findings of intestinal obstruction. The appendix is normal. Pelvis:  Bladder is unremarkable in appearance. There is no pleural effusion. Peritoneum/Retroperitoneum: There is no abdominal aortic aneurysm. There is no free intraperitoneal air. There is no abnormal fluid collection. is Bones/Soft Tissues: No acute bony or soft tissue abnormality seen. 1. Splenomegaly measuring about 16 cm. 2. Otherwise no acute abnormality seen.     Xr Chest Portable    Result Date: 1/23/2021 EXAMINATION: ONE XRAY VIEW OF THE CHEST 1/23/2021 2:15 am COMPARISON: None. HISTORY: ORDERING SYSTEM PROVIDED HISTORY: chest pain TECHNOLOGIST PROVIDED HISTORY: Reason for exam:->chest pain What reading provider will be dictating this exam?->CRC FINDINGS: Heart size is normal. Pulmonary vasculature is not congested. The lungs are clear. The pleural spaces are clear. There is no pneumothorax. There is no acute abnormality seen.         ASSESSMENT:  35 y.o. male with intractable Nausea and vomiting     PLAN:  Admit for observation   IVF  Okay for ice chips   Nausea control   RUQ U/S    Electronically signed by Heavenly Byers DO on 1/23/21 at 5:40 AM EST    General Surgery Consult Note  Santi Zaidi MD, MS    Patient's Name/Date of Birth: Holley Devine / 1980    Date: January 23, 2021     Surgeon: Jonathan Hartley MD    Requesting Physician:     Chief Complaint: RUQ pain    Patient Active Problem List   Diagnosis    Recurrent major depressive disorder ([address]    WILL on CPAP    Diabetes mellitus type 2 in obese ([address]    Benign essential HTN    Depression    ALVINO (generalized anxiety disorder)    Morbid obesity with BMI of 50.0-59.9, adult ([address]    Gastroesophageal reflux disease without esophagitis    S/P laparoscopic sleeve gastrectomy    Intractable nausea and vomiting Subjective: As above. I saw and examined the patient and discussed the above HPI with the resident and agree with documented positive and negative findings. Patient gives a history of going to [address] [facility] last night and indulging into large tacos containing [place] Beef. States 2 hours after that he had horrible right upper quadrant pain and he knows it was not something he should have done he then vomited multiple times and his pain got worse so he came to the ER. Upon arrival in the ER he failed to tell anybody the history that he had eaten Chipotle against direction of his bariatric surgeon. His CT scan was relatively unremarkable although his gallbladder appeared distended and he had elevated white count. Upon further discussion he was admitting to his p.o. intake that was against his prescribed diet he also gives a history that this is happened to him multiple times in the past including 2 to 3 weeks after his sleeve gastrectomy. He has been adhering to his diet most of the time with 45 pounds of weight loss in the last 6 weeks. He still appears very uncomfortable with some right upper quadrant tenderness. Objective:  BP (!) 145/83   Pulse 71   Temp 98 °F (36.7 °C) (Oral)   Resp 16   Ht 6' (1.829 m)   Wt (!) 325 lb (147.4 kg)   SpO2 97%   BMI 44.08 kg/m²   Labs:  Reviewed by me and relevant abnormalities noted       A complete 10 system review was performed and are otherwise negative unless mentioned in the above HPI. Specific negatives are listed below but may not include all those reviewed.     General ROS: negative obtundation, AMS  ENT ROS: negative rhinorrhea, epistaxis  Allergy and Immunology ROS: negative itchy/watery eyes or nasal congestion  Hematological and Lymphatic ROS: negative spontaneous bleeding or bruising  Endocrine ROS: negative  lethargy, mood swings, palpitations or polydipsia/polyuria  Respiratory ROS: negative sputum changes, stridor, tachypnea or wheezing Right upper quadrant ultrasound recently done once he arrived on the floor which is equivocal  We will obtain HIDA scan  We will start antibiotics in anticipation of positive HIDA scan  Would proceed with OR for lap nita tomorrow if confirmed  Discussed risks of injury to liver, common bile duct, hepatic duct, surrounding vascular structures, small bowel, stomach. Risk for further surgery to correct complications. Plan for laparoscopic, possible open cholecystectomy with possible intraoperative cholangiogram. Patient agrees and all questions answered to their and family's satisfaction      I have personally participated in a face-to-face history and physical exam on the date of service. Reviewed chart, vitals, labs and radiologic studies. I also participated in medical decision making with the resident/NP on the date of service and I agree with all of the pertinent clinical information, assessment and treatment plan. I have reviewed and edited the note above based on my findings during my history, exam, and decision making.        Physician Signature: Electronically signed by Dr. Kassidy Dumont  [phone] (p)  1/23/2021  12:47 PM  s

## 2021-01-24 ENCOUNTER — ANESTHESIA (OUTPATIENT)
Dept: OPERATING ROOM | Age: 41
DRG: 418 | End: 2021-01-24
Payer: MEDICARE

## 2021-01-24 ENCOUNTER — APPOINTMENT (OUTPATIENT)
Dept: NUCLEAR MEDICINE | Age: 41
DRG: 418 | End: 2021-01-24
Payer: MEDICARE

## 2021-01-24 VITALS — OXYGEN SATURATION: 100 % | SYSTOLIC BLOOD PRESSURE: 149 MMHG | DIASTOLIC BLOOD PRESSURE: 72 MMHG

## 2021-01-24 LAB
ANION GAP SERPL CALCULATED.3IONS-SCNC: 11 MMOL/L (ref 7–16)
BUN BLDV-MCNC: 7 MG/DL (ref 6–20)
CALCIUM SERPL-MCNC: 9.4 MG/DL (ref 8.6–10.2)
CHLORIDE BLD-SCNC: 100 MMOL/L (ref 98–107)
CO2: 27 MMOL/L (ref 22–29)
CREAT SERPL-MCNC: 0.9 MG/DL (ref 0.7–1.2)
GFR AFRICAN AMERICAN: >60
GFR NON-AFRICAN AMERICAN: >60 ML/MIN/1.73
GLUCOSE BLD-MCNC: 135 MG/DL (ref 74–99)
HCT VFR BLD CALC: 37.7 % (ref 37–54)
HEMOGLOBIN: 12.2 G/DL (ref 12.5–16.5)
MCH RBC QN AUTO: 25.5 PG (ref 26–35)
MCHC RBC AUTO-ENTMCNC: 32.4 % (ref 32–34.5)
MCV RBC AUTO: 78.9 FL (ref 80–99.9)
PDW BLD-RTO: 14.6 FL (ref 11.5–15)
PLATELET # BLD: 252 E9/L (ref 130–450)
PMV BLD AUTO: 10.9 FL (ref 7–12)
POTASSIUM REFLEX MAGNESIUM: 3.7 MMOL/L (ref 3.5–5)
RBC # BLD: 4.78 E12/L (ref 3.8–5.8)
SODIUM BLD-SCNC: 138 MMOL/L (ref 132–146)
WBC # BLD: 14.4 E9/L (ref 4.5–11.5)

## 2021-01-24 PROCEDURE — 3700000001 HC ADD 15 MINUTES (ANESTHESIA): Performed by: SURGERY

## 2021-01-24 PROCEDURE — 6360000002 HC RX W HCPCS: Performed by: STUDENT IN AN ORGANIZED HEALTH CARE EDUCATION/TRAINING PROGRAM

## 2021-01-24 PROCEDURE — 2709999900 HC NON-CHARGEABLE SUPPLY: Performed by: SURGERY

## 2021-01-24 PROCEDURE — 6360000002 HC RX W HCPCS: Performed by: SURGERY

## 2021-01-24 PROCEDURE — 2580000003 HC RX 258: Performed by: SURGERY

## 2021-01-24 PROCEDURE — 2500000003 HC RX 250 WO HCPCS: Performed by: SURGERY

## 2021-01-24 PROCEDURE — 7100000000 HC PACU RECOVERY - FIRST 15 MIN: Performed by: SURGERY

## 2021-01-24 PROCEDURE — G0378 HOSPITAL OBSERVATION PER HR: HCPCS

## 2021-01-24 PROCEDURE — A9537 TC99M MEBROFENIN: HCPCS | Performed by: RADIOLOGY

## 2021-01-24 PROCEDURE — 0FT44ZZ RESECTION OF GALLBLADDER, PERCUTANEOUS ENDOSCOPIC APPROACH: ICD-10-PCS | Performed by: SURGERY

## 2021-01-24 PROCEDURE — 3700000000 HC ANESTHESIA ATTENDED CARE: Performed by: SURGERY

## 2021-01-24 PROCEDURE — 6370000000 HC RX 637 (ALT 250 FOR IP): Performed by: STUDENT IN AN ORGANIZED HEALTH CARE EDUCATION/TRAINING PROGRAM

## 2021-01-24 PROCEDURE — 47562 LAPAROSCOPIC CHOLECYSTECTOMY: CPT | Performed by: SURGERY

## 2021-01-24 PROCEDURE — 78226 HEPATOBILIARY SYSTEM IMAGING: CPT | Performed by: RADIOLOGY

## 2021-01-24 PROCEDURE — 7100000001 HC PACU RECOVERY - ADDTL 15 MIN: Performed by: SURGERY

## 2021-01-24 PROCEDURE — 3600000013 HC SURGERY LEVEL 3 ADDTL 15MIN: Performed by: SURGERY

## 2021-01-24 PROCEDURE — 36415 COLL VENOUS BLD VENIPUNCTURE: CPT

## 2021-01-24 PROCEDURE — 85027 COMPLETE CBC AUTOMATED: CPT

## 2021-01-24 PROCEDURE — 88304 TISSUE EXAM BY PATHOLOGIST: CPT

## 2021-01-24 PROCEDURE — 2500000003 HC RX 250 WO HCPCS

## 2021-01-24 PROCEDURE — 3430000000 HC RX DIAGNOSTIC RADIOPHARMACEUTICAL: Performed by: RADIOLOGY

## 2021-01-24 PROCEDURE — 6370000000 HC RX 637 (ALT 250 FOR IP): Performed by: SURGERY

## 2021-01-24 PROCEDURE — 3600000003 HC SURGERY LEVEL 3 BASE: Performed by: SURGERY

## 2021-01-24 PROCEDURE — 2580000003 HC RX 258: Performed by: STUDENT IN AN ORGANIZED HEALTH CARE EDUCATION/TRAINING PROGRAM

## 2021-01-24 PROCEDURE — 6360000002 HC RX W HCPCS

## 2021-01-24 PROCEDURE — 80048 BASIC METABOLIC PNL TOTAL CA: CPT

## 2021-01-24 PROCEDURE — 2580000003 HC RX 258

## 2021-01-24 PROCEDURE — 78226 HEPATOBILIARY SYSTEM IMAGING: CPT

## 2021-01-24 RX ORDER — BUPIVACAINE HYDROCHLORIDE AND EPINEPHRINE 5; 5 MG/ML; UG/ML
INJECTION, SOLUTION EPIDURAL; INTRACAUDAL; PERINEURAL PRN
Status: DISCONTINUED | OUTPATIENT
Start: 2021-01-24 | End: 2021-01-24 | Stop reason: ALTCHOICE

## 2021-01-24 RX ORDER — PROMETHAZINE HYDROCHLORIDE 25 MG/ML
6.25 INJECTION, SOLUTION INTRAMUSCULAR; INTRAVENOUS
Status: DISCONTINUED | OUTPATIENT
Start: 2021-01-24 | End: 2021-01-24 | Stop reason: HOSPADM

## 2021-01-24 RX ORDER — ONDANSETRON 2 MG/ML
INJECTION INTRAMUSCULAR; INTRAVENOUS PRN
Status: DISCONTINUED | OUTPATIENT
Start: 2021-01-24 | End: 2021-01-24 | Stop reason: SDUPTHER

## 2021-01-24 RX ORDER — GLYCOPYRROLATE 1 MG/5 ML
SYRINGE (ML) INTRAVENOUS PRN
Status: DISCONTINUED | OUTPATIENT
Start: 2021-01-24 | End: 2021-01-24 | Stop reason: SDUPTHER

## 2021-01-24 RX ORDER — ROCURONIUM BROMIDE 10 MG/ML
INJECTION, SOLUTION INTRAVENOUS PRN
Status: DISCONTINUED | OUTPATIENT
Start: 2021-01-24 | End: 2021-01-24 | Stop reason: SDUPTHER

## 2021-01-24 RX ORDER — NEOSTIGMINE METHYLSULFATE 1 MG/ML
INJECTION, SOLUTION INTRAVENOUS PRN
Status: DISCONTINUED | OUTPATIENT
Start: 2021-01-24 | End: 2021-01-24 | Stop reason: SDUPTHER

## 2021-01-24 RX ORDER — LIDOCAINE HYDROCHLORIDE 20 MG/ML
INJECTION, SOLUTION INTRAVENOUS PRN
Status: DISCONTINUED | OUTPATIENT
Start: 2021-01-24 | End: 2021-01-24 | Stop reason: SDUPTHER

## 2021-01-24 RX ORDER — FENTANYL CITRATE 50 UG/ML
INJECTION, SOLUTION INTRAMUSCULAR; INTRAVENOUS PRN
Status: DISCONTINUED | OUTPATIENT
Start: 2021-01-24 | End: 2021-01-24 | Stop reason: SDUPTHER

## 2021-01-24 RX ORDER — MIDAZOLAM HYDROCHLORIDE 1 MG/ML
INJECTION INTRAMUSCULAR; INTRAVENOUS PRN
Status: DISCONTINUED | OUTPATIENT
Start: 2021-01-24 | End: 2021-01-24 | Stop reason: SDUPTHER

## 2021-01-24 RX ORDER — ONDANSETRON 2 MG/ML
4 INJECTION INTRAMUSCULAR; INTRAVENOUS
Status: DISCONTINUED | OUTPATIENT
Start: 2021-01-24 | End: 2021-01-24 | Stop reason: HOSPADM

## 2021-01-24 RX ORDER — SODIUM CHLORIDE 9 MG/ML
INJECTION, SOLUTION INTRAVENOUS CONTINUOUS PRN
Status: DISCONTINUED | OUTPATIENT
Start: 2021-01-24 | End: 2021-01-24 | Stop reason: SDUPTHER

## 2021-01-24 RX ORDER — PROPOFOL 10 MG/ML
INJECTION, EMULSION INTRAVENOUS PRN
Status: DISCONTINUED | OUTPATIENT
Start: 2021-01-24 | End: 2021-01-24 | Stop reason: SDUPTHER

## 2021-01-24 RX ADMIN — LIDOCAINE HYDROCHLORIDE 100 MG: 20 INJECTION, SOLUTION INTRAVENOUS at 14:56

## 2021-01-24 RX ADMIN — ONDANSETRON 4 MG: 2 INJECTION INTRAMUSCULAR; INTRAVENOUS at 05:52

## 2021-01-24 RX ADMIN — METRONIDAZOLE 500 MG: 500 INJECTION, SOLUTION INTRAVENOUS at 19:47

## 2021-01-24 RX ADMIN — METRONIDAZOLE 500 MG: 500 INJECTION, SOLUTION INTRAVENOUS at 11:58

## 2021-01-24 RX ADMIN — ONDANSETRON HYDROCHLORIDE 4 MG: 2 INJECTION, SOLUTION INTRAMUSCULAR; INTRAVENOUS at 15:39

## 2021-01-24 RX ADMIN — OXYCODONE HYDROCHLORIDE 10 MG: 10 TABLET ORAL at 01:48

## 2021-01-24 RX ADMIN — FENTANYL CITRATE 100 MCG: 50 INJECTION, SOLUTION INTRAMUSCULAR; INTRAVENOUS at 15:16

## 2021-01-24 RX ADMIN — FENTANYL CITRATE 100 MCG: 50 INJECTION, SOLUTION INTRAMUSCULAR; INTRAVENOUS at 14:56

## 2021-01-24 RX ADMIN — OXYCODONE HYDROCHLORIDE 10 MG: 10 TABLET ORAL at 05:52

## 2021-01-24 RX ADMIN — SODIUM CHLORIDE, PRESERVATIVE FREE 10 ML: 5 INJECTION INTRAVENOUS at 19:47

## 2021-01-24 RX ADMIN — WATER 1000 MG: 1 INJECTION INTRAMUSCULAR; INTRAVENOUS; SUBCUTANEOUS at 11:58

## 2021-01-24 RX ADMIN — MIDAZOLAM 2 MG: 1 INJECTION INTRAMUSCULAR; INTRAVENOUS at 14:56

## 2021-01-24 RX ADMIN — Medication 3 MG: at 15:39

## 2021-01-24 RX ADMIN — SODIUM CHLORIDE, POTASSIUM CHLORIDE, SODIUM LACTATE AND CALCIUM CHLORIDE: 600; 310; 30; 20 INJECTION, SOLUTION INTRAVENOUS at 09:26

## 2021-01-24 RX ADMIN — Medication 0.6 MG: at 15:39

## 2021-01-24 RX ADMIN — ROCURONIUM BROMIDE 25 MG: 10 INJECTION, SOLUTION INTRAVENOUS at 15:13

## 2021-01-24 RX ADMIN — Medication 10 MILLICURIE: at 07:47

## 2021-01-24 RX ADMIN — PROPOFOL 200 MG: 10 INJECTION, EMULSION INTRAVENOUS at 14:56

## 2021-01-24 RX ADMIN — SODIUM CHLORIDE: 9 INJECTION, SOLUTION INTRAVENOUS at 14:48

## 2021-01-24 RX ADMIN — OXYCODONE HYDROCHLORIDE 10 MG: 10 TABLET ORAL at 10:05

## 2021-01-24 RX ADMIN — METRONIDAZOLE 500 MG: 500 INJECTION, SOLUTION INTRAVENOUS at 03:00

## 2021-01-24 RX ADMIN — PANTOPRAZOLE SODIUM 40 MG: 40 TABLET, DELAYED RELEASE ORAL at 05:52

## 2021-01-24 RX ADMIN — OXYCODONE HYDROCHLORIDE 10 MG: 10 TABLET ORAL at 19:47

## 2021-01-24 ASSESSMENT — PAIN DESCRIPTION - PAIN TYPE: TYPE: ACUTE PAIN;SURGICAL PAIN

## 2021-01-24 ASSESSMENT — PAIN DESCRIPTION - ONSET: ONSET: ON-GOING

## 2021-01-24 ASSESSMENT — PULMONARY FUNCTION TESTS
PIF_VALUE: 33
PIF_VALUE: 18
PIF_VALUE: 9
PIF_VALUE: 40
PIF_VALUE: 3
PIF_VALUE: 35
PIF_VALUE: 35
PIF_VALUE: 32
PIF_VALUE: 36
PIF_VALUE: 10
PIF_VALUE: 0
PIF_VALUE: 30
PIF_VALUE: 29
PIF_VALUE: 21
PIF_VALUE: 33
PIF_VALUE: 3
PIF_VALUE: 15
PIF_VALUE: 33
PIF_VALUE: 17
PIF_VALUE: 4
PIF_VALUE: 3
PIF_VALUE: 1
PIF_VALUE: 16
PIF_VALUE: 36
PIF_VALUE: 0
PIF_VALUE: 36
PIF_VALUE: 1
PIF_VALUE: 38
PIF_VALUE: 38
PIF_VALUE: 3
PIF_VALUE: 32
PIF_VALUE: 1

## 2021-01-24 ASSESSMENT — PAIN SCALES - GENERAL
PAINLEVEL_OUTOF10: 7
PAINLEVEL_OUTOF10: 2

## 2021-01-24 ASSESSMENT — PAIN DESCRIPTION - DESCRIPTORS: DESCRIPTORS: ACHING;CONSTANT;DISCOMFORT

## 2021-01-24 ASSESSMENT — PAIN DESCRIPTION - FREQUENCY: FREQUENCY: INTERMITTENT

## 2021-01-24 ASSESSMENT — PAIN DESCRIPTION - LOCATION: LOCATION: ABDOMEN

## 2021-01-24 ASSESSMENT — PAIN - FUNCTIONAL ASSESSMENT: PAIN_FUNCTIONAL_ASSESSMENT: ACTIVITIES ARE NOT PREVENTED

## 2021-01-24 ASSESSMENT — PAIN DESCRIPTION - ORIENTATION: ORIENTATION: RIGHT;MID;UPPER

## 2021-01-24 NOTE — OP NOTE
We initially made a 5-mm incision superior to the umbilicus, inserted a Veress needle, confirmed needle placement and insufflated to 15 mmHg. We then removed the Veress needle and inserted a 5-mm trocar and inserted a camera to follow, and inspected the abdomen. Upon inspection of the abdomen, there was extensive inflammation around the right upper quadrant near the gallbladder. A 12-mm trocar was inserted subxiphoid just right and lateral to the falciform ligament and two more 5-mm trocars in the right upper quadrant. Atraumatic graspers were used grasp the gallbladder and retract cephalad. The gallbladder was distended, and too taught to grasp. Cautery was used to open the fundus and suction  was used to decompress the gallbladder. The bile was and thick. The gallbladder itself was thickened and chronically and acutely inflamed with edema and signs of infection and necrosis. We then again retracted the gallbladder cephalad and exposed the infundibulum identifying the infundibulum. We dissected the peritoneum and omentum as well as transverse colon and duodenum off the infundibulum identifying the cystic duct junction. The cystic duct was skeletonized. The cystic artery was also identified ands skeletonized confirming it was leading directly into the gallbladder. The critical view was obtained. The 10mm titanium clip applier was used to place a single clip on the gallbladder side of the cystic duct. 2 clips proximally on the patient side and one distal on the gallbladder side of the cystic artery. The cystic duct was friable and I was concerned it may avulse with a cholangiogram. The anatomy was clearly identified. Two clips were placed on the patient side, 1 distal on the gallbladder side of the cystic duct and it was transected with laparoscopic scissors. Electrocautery was then used to dissect the gallbladder from the gallbladder fossa. Traction-countertraction technique was used to expose the medial and lateral aspects of the gallbladder. Gallbladder was completely dissected off the gallbladder fossa, placed in a laparoscopic bag, and retracted through the 12-mm port site. We then inspected our clip sites which were intact. Suction irrigation was undertaken to clear out the abdomen and it was suctioned until clear. One liter of fluids was used to irrigate. Surigcel snow was placed in the gallbladder fossa to ensure hemostasis. The gallbladder fossa was dry at this point and clips remained intact. We then removed the trocars under direct visualization. The abdomen was decompressed. The subxiphoid fascial incision was closed using 0 Vicryl suture and the suture closure device. Then we used 4-0 Monocryl suture to reapproximate the skin. Surgical glue was placed over the skin. The patient was awoken and extubated in the operating room without any difficulty, and transferred to the postoperative care unit in stable condition. All instrument counts, lap counts, and needle counts were correct at the completion of the procedure.     Vesta Garner MD  Minimally Invasive and Bariatric Surgery  1/24/2021  3:43 PM

## 2021-01-24 NOTE — PLAN OF CARE
Problem: Pain:  Goal: Pain level will decrease  Description: Pain level will decrease  1/24/2021 1017 by Alberto Osborn RN  Outcome: Met This Shift

## 2021-01-24 NOTE — ANESTHESIA POSTPROCEDURE EVALUATION
Department of Anesthesiology  Postprocedure Note    Patient: Jennifer Dalal  MRN: [id number]  YOB: 1980  Date of evaluation: 1/24/2021  Time:  4:34 PM     Procedure Summary     Date: 01/24/21 Room / Location: [place] [address] / [facility]    Anesthesia Start: 9650 Anesthesia Stop: 5904    Procedure: CHOLECYSTECTOMY LAPAROSCOPIC (N/A Abdomen) Diagnosis: (Abel Arndt)    Surgeons: Quyen Choe MD Responsible Provider: Wong Page MD    Anesthesia Type: general ASA Status: 3          Anesthesia Type: general    Argenis Phase I: Argenis Score: 8    Argenis Phase II:      Last vitals: Reviewed and per EMR flowsheets.        Anesthesia Post Evaluation    Patient location during evaluation: PACU  Patient participation: complete - patient participated  Level of consciousness: awake  Pain score: 0  Airway patency: patent  Nausea & Vomiting: no nausea  Complications: no  Cardiovascular status: hemodynamically stable  Respiratory status: acceptable  Hydration status: stable

## 2021-01-24 NOTE — PROGRESS NOTES
Floor Called, nurse to nurse given. . Patients test results review, VS reported to receiving nurse. Any and all important information regarding patient disclosed. Patient transferred to floor on bed in stable condition with ancillary staff.

## 2021-01-24 NOTE — PROGRESS NOTES
Patient seen and examined  Feeling better with abx and NPO  All questions answered for deshawn moya today    OR today    Electronically signed by Richard Franklin DO on 1/24/2021 at 6:20 AM

## 2021-01-24 NOTE — PLAN OF CARE
Problem: Pain:  Goal: Pain level will decrease  Description: Pain level will decrease  1/23/2021 2359 by Rudy Torres  Outcome: Met This Shift     Problem: Pain:  Goal: Control of acute pain  Description: Control of acute pain  Outcome: Met This Shift

## 2021-01-25 VITALS
TEMPERATURE: 98.2 F | SYSTOLIC BLOOD PRESSURE: 151 MMHG | OXYGEN SATURATION: 94 % | DIASTOLIC BLOOD PRESSURE: 83 MMHG | HEART RATE: 62 BPM | HEIGHT: 72 IN | BODY MASS INDEX: 42.66 KG/M2 | RESPIRATION RATE: 16 BRPM | WEIGHT: 315 LBS

## 2021-01-25 PROBLEM — K81.0 ACUTE CHOLECYSTITIS: Status: ACTIVE | Noted: 2021-01-25

## 2021-01-25 LAB
ANION GAP SERPL CALCULATED.3IONS-SCNC: 10 MMOL/L (ref 7–16)
BUN BLDV-MCNC: 7 MG/DL (ref 6–20)
CALCIUM SERPL-MCNC: 9 MG/DL (ref 8.6–10.2)
CHLORIDE BLD-SCNC: 99 MMOL/L (ref 98–107)
CO2: 28 MMOL/L (ref 22–29)
CREAT SERPL-MCNC: 0.8 MG/DL (ref 0.7–1.2)
GFR AFRICAN AMERICAN: >60
GFR NON-AFRICAN AMERICAN: >60 ML/MIN/1.73
GLUCOSE BLD-MCNC: 119 MG/DL (ref 74–99)
HCT VFR BLD CALC: 36.6 % (ref 37–54)
HEMOGLOBIN: 12.2 G/DL (ref 12.5–16.5)
MAGNESIUM: 1.8 MG/DL (ref 1.6–2.6)
MCH RBC QN AUTO: 26.2 PG (ref 26–35)
MCHC RBC AUTO-ENTMCNC: 33.3 % (ref 32–34.5)
MCV RBC AUTO: 78.5 FL (ref 80–99.9)
PDW BLD-RTO: 14.6 FL (ref 11.5–15)
PLATELET # BLD: 236 E9/L (ref 130–450)
PMV BLD AUTO: 11 FL (ref 7–12)
POTASSIUM REFLEX MAGNESIUM: 3.5 MMOL/L (ref 3.5–5)
RBC # BLD: 4.66 E12/L (ref 3.8–5.8)
SODIUM BLD-SCNC: 137 MMOL/L (ref 132–146)
WBC # BLD: 14.1 E9/L (ref 4.5–11.5)

## 2021-01-25 PROCEDURE — 2500000003 HC RX 250 WO HCPCS: Performed by: SURGERY

## 2021-01-25 PROCEDURE — 96376 TX/PRO/DX INJ SAME DRUG ADON: CPT

## 2021-01-25 PROCEDURE — 36415 COLL VENOUS BLD VENIPUNCTURE: CPT

## 2021-01-25 PROCEDURE — 96366 THER/PROPH/DIAG IV INF ADDON: CPT

## 2021-01-25 PROCEDURE — 85027 COMPLETE CBC AUTOMATED: CPT

## 2021-01-25 PROCEDURE — 80048 BASIC METABOLIC PNL TOTAL CA: CPT

## 2021-01-25 PROCEDURE — 6370000000 HC RX 637 (ALT 250 FOR IP): Performed by: SURGERY

## 2021-01-25 PROCEDURE — 83735 ASSAY OF MAGNESIUM: CPT

## 2021-01-25 PROCEDURE — 1200000000 HC SEMI PRIVATE

## 2021-01-25 PROCEDURE — 6360000002 HC RX W HCPCS: Performed by: SURGERY

## 2021-01-25 PROCEDURE — 2580000003 HC RX 258: Performed by: SURGERY

## 2021-01-25 RX ORDER — OXYCODONE HYDROCHLORIDE 5 MG/1
5 TABLET ORAL EVERY 6 HOURS PRN
Qty: 20 TABLET | Refills: 0 | Status: SHIPPED | OUTPATIENT
Start: 2021-01-25 | End: 2021-01-30

## 2021-01-25 RX ADMIN — OXYCODONE HYDROCHLORIDE 10 MG: 10 TABLET ORAL at 13:08

## 2021-01-25 RX ADMIN — METRONIDAZOLE 500 MG: 500 INJECTION, SOLUTION INTRAVENOUS at 12:24

## 2021-01-25 RX ADMIN — OXYCODONE HYDROCHLORIDE 10 MG: 10 TABLET ORAL at 04:15

## 2021-01-25 RX ADMIN — PANTOPRAZOLE SODIUM 40 MG: 40 TABLET, DELAYED RELEASE ORAL at 05:21

## 2021-01-25 RX ADMIN — METRONIDAZOLE 500 MG: 500 INJECTION, SOLUTION INTRAVENOUS at 02:57

## 2021-01-25 RX ADMIN — OXYCODONE HYDROCHLORIDE 10 MG: 10 TABLET ORAL at 08:26

## 2021-01-25 RX ADMIN — WATER 1000 MG: 1 INJECTION INTRAMUSCULAR; INTRAVENOUS; SUBCUTANEOUS at 12:24

## 2021-01-25 RX ADMIN — OXYCODONE HYDROCHLORIDE 10 MG: 10 TABLET ORAL at 00:07

## 2021-01-25 ASSESSMENT — PAIN SCALES - GENERAL: PAINLEVEL_OUTOF10: 7

## 2021-01-25 NOTE — PROGRESS NOTES
GENERAL SURGERY  DAILY PROGRESS NOTE  1/25/2021    Subjective:  Pt seen and examined this morning. He is doing well postoperatively. No nausea or vomiting but has not eaten yet due to poor appetite. Objective:  BP (!) 163/82   Pulse 88   Temp 98.6 °F (37 °C) (Temporal)   Resp 18   Ht 6' (1.829 m)   Wt (!) 325 lb (147.4 kg)   SpO2 96%   BMI 44.08 kg/m²     GENERAL:  Laying in bed, awake, alert, cooperative, no apparent distress  HEAD: Normocephalic, atraumatic  EYES: No sclera icterus, pupils equal  LUNGS:  No increased work of breathing  CARDIOVASCULAR:  RR  ABDOMEN:  Soft, appropriate incisional tenderness.  Incisions are c/d/i  EXTREMITIES: No edema or swelling  SKIN: Warm and dry    Assessment/Plan:  35 y.o. male who presented with intractable nausea and vomiting found to have acute cholecystitis s/p laparoscopic cholecystectomy on 1/24    - discontinue antibiotics this afternoon  - trial of diet  - possibly discharge this afternoon if tolerating diet    Electronically signed by Robert Pelayo MD on 1/25/2021 at 5:37 AM

## 2021-01-25 NOTE — CARE COORDINATION
Social Work Discharge/Planning:    Discharge order noted. SW met with patient to explain role and discuss transition of care. Patient reports being independent and able to drive prior to admission. Patient is from home. Patient lives with his uncle Elmira Álvarez [phone]) in a 2 story home that has 2-3 steps to enter. Bed and bath located on the 2nd floor. Patient has no DME. Patient does not wear oxygen at home. Patient denies WAI/SNF/HHC hx. Patient's PCP is Dr. Caroline Lima. Patient's pharmacy is [facility] located on [address] [facility]. Patient reports plan is to return home with no anticipated needs at this time. Patient reports uncle is able to transport him home upon discharge. JO/HALI to follow for any needs that may arise upon discharge.     FOZIA Tadeo  [phone]

## 2021-01-26 NOTE — DISCHARGE SUMMARY
Physician Discharge Summary     Patient ID:  Katt Downey  [id number]  35 y.o.  1980    Admit date: 2021    Discharge date and time: 2021  3:50 PM     Admitting Physician: Maurisio Taylor MD     Admission Diagnoses: Intractable nausea and vomiting [R11.2]  Acute cholecystitis [K81.0]    Discharge Diagnoses: Active Problems:    Intractable nausea and vomiting    Acute cholecystitis  Resolved Problems:    * No resolved hospital problems. *      Admission Condition: fair    Discharged Condition: stable      Hospital Course:  Katt Downey is a 35 y.o. male who presented with abdominal pain, nausea and vomiting. Work up revealed acute cholecystitis and he underwent a laparoscopic cholecystectomy. He progressed well postoperatively, pain was controlled on PO medications. He was tolerating a regular diet with no nausea or vomiting, and was in a suitable condition for discharge to home in stable condition. Consults:   IP CONSULT TO GENERAL SURGERY    Significant Diagnostic Studies:   Nm Hepatobiliary    Result Date: 2021  Patient MRN: [id number] : 1980 Age:  35 years Gender: Male Order Date: 2021 7:46 AM Exam: NM HEPATOBILIARY Number of Images: 11 views Indication:   eval cholecystitis eval cholecystitis What reading provider will be dictating this exam?->[facility] Comparison: Ultrasound gallbladder 2021 Findings: The patient was injected with 10 mCi of technetium mebrofenin. Clearance of radiotracer from the blood pool was normal Excretion of radiotracer from the liver appears to be normal Excretion of radiotracer into the biliary tree appears to be normal Excretion of tracer into the small bowel appears to be normal. The gallbladder was was not visualized . Justina Herzog 1.   Findings consistent with acute cholecystitis    Ct Abdomen Pelvis W Iv Contrast Additional Contrast? None    Result Date: 2021 EXAMINATION: CT OF THE ABDOMEN AND PELVIS WITH CONTRAST 1/23/2021 4:06 am TECHNIQUE: CT of the abdomen and pelvis was performed with the administration of intravenous contrast. Multiplanar reformatted images are provided for review. Dose modulation, iterative reconstruction, and/or weight based adjustment of the mA/kV was utilized to reduce the radiation dose to as low as reasonably achievable. COMPARISON: None. HISTORY: ORDERING SYSTEM PROVIDED HISTORY: right flank pain TECHNOLOGIST PROVIDED HISTORY: Additional Contrast?->None Reason for exam:->right flank pain What reading provider will be dictating this exam?->[facility] FINDINGS: Lower Chest: The visualized portions of the lung bases are clear. Organs: Within the limitations of a noncontrast exam, the visualized liver, is pancreas, adrenal glands demonstrate no significant abnormality. There is nonspecific splenomegaly measuring 16 cm. There is a subcentimeter low-density lesion in each kidney which are too small to characterize but although represented tiny cysts. There is no nephrolithiasis, hydronephrosis, hydroureter or other evidence for acute obstructive uropathy. GI/Bowel: There are no findings of intestinal obstruction. The appendix is normal. Pelvis:  Bladder is unremarkable in appearance. There is no pleural effusion. Peritoneum/Retroperitoneum: There is no abdominal aortic aneurysm. There is no free intraperitoneal air. There is no abnormal fluid collection. is Bones/Soft Tissues: No acute bony or soft tissue abnormality seen. 1. Splenomegaly measuring about 16 cm. 2. Otherwise no acute abnormality seen.     Us Gallbladder Ruq    Result Date: 1/23/2021 EXAMINATION: RIGHT UPPER QUADRANT ULTRASOUND 1/23/2021 7:22 am COMPARISON: Gallbladder ultrasound from June 8, 2020, CT abdomen and pelvis from the same day HISTORY: ORDERING SYSTEM PROVIDED HISTORY: ruq pain TECHNOLOGIST PROVIDED HISTORY: Reason for exam:->ruq pain What reading provider will be dictating this exam?->[facility] FINDINGS: The gallbladder is not dilated. No gallbladder wall thickening or pericholecystic fluid. Multiple tiny gallstones are present within the dependent aspect of the gallbladder. A sonographic Fine's sign was elicited. The common bile duct is at least mildly dilated, measuring 8 mm. Findings equivocal for acute cholecystitis. Cholelithiasis is noted and the patient did have tenderness while being scanned over the right upper quadrant. However, there is no obvious gallbladder wall thickening or pericholecystic fluid. Consider correlation with nuclear medicine hepatobiliary scan if there is persistent clinical concern for acute cholecystitis. Xr Chest Portable    Result Date: 1/23/2021  EXAMINATION: ONE XRAY VIEW OF THE CHEST 1/23/2021 2:15 am COMPARISON: None. HISTORY: ORDERING SYSTEM PROVIDED HISTORY: chest pain TECHNOLOGIST PROVIDED HISTORY: Reason for exam:->chest pain What reading provider will be dictating this exam?->[facility] FINDINGS: Heart size is normal. Pulmonary vasculature is not congested. The lungs are clear. The pleural spaces are clear. There is no pneumothorax. There is no acute abnormality seen. Discharge Exam:  BP (!) 163/82   Pulse 88   Temp 98.6 °F (37 °C) (Temporal)   Resp 18   Ht 6' (1.829 m)   Wt (!) 325 lb (147.4 kg)   SpO2 96%   BMI 44.08 kg/m²      GENERAL:  Laying in bed, awake, alert, cooperative, no apparent distress  HEAD: Normocephalic, atraumatic  EYES: No sclera icterus, pupils equal  LUNGS:  No increased work of breathing  CARDIOVASCULAR:  RR  ABDOMEN:  Soft, appropriate incisional tenderness.  Incisions are c/d/i EXTREMITIES: No edema or swelling  SKIN: Warm and dry    Disposition: home    In process/preliminary results:  Outstanding Order Results     Date and Time Order Name Status Description    1/24/2021 0000 Surgical Pathology In process           Patient Instructions:   Discharge Medication List as of 1/25/2021  1:37 PM      START taking these medications    Details   oxyCODONE (ROXICODONE) 5 MG immediate release tablet Take 1 tablet by mouth every 6 hours as needed for Pain for up to 5 days. , Disp-20 tablet, R-0Normal         CONTINUE these medications which have NOT CHANGED    Details   docusate sodium (COLACE) 100 MG capsule Take 100 mg by mouth 3 times daily as needed for ConstipationHistorical Med      sertraline (ZOLOFT) 50 MG tablet Take 1 tablet by mouth nightly, Disp-90 tablet, R-1Normal      ondansetron (ZOFRAN-ODT) 4 MG disintegrating tablet Take 1 tablet by mouth every 8 hours as needed for Nausea or Vomiting, Disp-10 tablet, R-2Normal      Multiple Vitamin (MVI, BARIATRIC ADVANTAGE MULTI-FORMULA, CHEW TAB) Take 1 tablet by mouth dailyHistorical Med      Calcium Carbonate (CALCI-CHEW PO) Take 1 tablet by mouth 3 times dailyHistorical Med      Ferrous Sulfate (IRON PO) Take 1 tablet by mouth dailyHistorical Med      Cholecalciferol (VITAMIN D3) 125 MCG (5000 UT) TABS Take 1 tablet by mouth dailyHistorical Med      omeprazole (PRILOSEC) 20 MG delayed release capsule Take 1 capsule by mouth daily, Disp-30 capsule,R-12Normal      blood glucose monitor strips Accucheck meter-pt check BS daily. , Disp-100 strip,R-2, Normal      Lancets MISC Disp-100 each,R-2, NormalAccucheck or what insurance covers. Misc. Devices MISC Disp-1 Device, R-0, NormalGive one Accucheck glucometer monitor or whatever the insurance approves.       LORATADINE PO Take 10 mg by mouth as neededHistorical Med             POST-OPERATIVE INSTRUCTIONS FOR GALLBLADDER SURGERY ? Call the office at [phone] to schedule your post-operative appointment with Dr. Vero Peter for two (2) weeks. ? You will have surgical glue closing your incisions, you may shower 24hours after your surgery but do not rub off glue, it will come off on its own over time. ? Do not bathe for 3 days after your surgery, shower only and pat incisions dry after shower. ? General guidelines for activity:  ·  Avoid strenuous activity or lifting anything heavier than 15 pounds for 4 weeks. ·  It is OK to be up, walking around, and walking up and down stairs. ·  Do what is comfortable: stop and rest when you feel tired. ? Drink plenty of fluids and stay on a bland diet for 2-3 days after surgery. ? Do NOT drive for one week and while taking your narcotic pain medicine. ? Watch for signs of infection:  · Excessive warmth or bright redness around your incisions  · Leakage of bloody or cloudy fluid from you incisions  · Fever over 100.5    ? During the laparoscopic procedure that you had, gas is pumped into the abdominal cavity. You may feel abdominal, shoulder, or rib pain for a few days due to this gas. ? You will have pain medicine ordered. Take as directed. BOWELS: constipation is a side effect of your pain meds, take a daily laxative (miralax, dulcolax, etc.) as needed to keep your bowels moving as they normally do, do not go 2-3 days without having a bowel movement. Pain medications; Percocet- take at least 1/2 pill every 6 hours the first 36 hours after surgery, and may take as many as 2 pills every 4 hours. After the first 36hours only take the pills as needed and stop them as soon as possible.  Pain meds cause constipation       Follow up:   Vero Peter MD  [address] [facility]  [phone]    Schedule an appointment as soon as possible for a visit in 2 weeks  For wound re-check       Signed:  Loly Borrego MD  1/26/2021  10:14 AM

## 2021-02-04 ENCOUNTER — HOSPITAL ENCOUNTER (OUTPATIENT)
Age: 41
Discharge: HOME | End: 2021-02-04
Payer: MEDICARE

## 2021-02-04 DIAGNOSIS — K91.2 MALNUTRITION FOLLOWING GASTROINTESTINAL SURGERY: Primary | ICD-10-CM

## 2021-02-04 DIAGNOSIS — K91.2 MALNUTRITION FOLLOWING GASTROINTESTINAL SURGERY: ICD-10-CM

## 2021-02-04 LAB
ALBUMIN SERPL-MCNC: 4 G/DL (ref 3.5–5.2)
ALP BLD-CCNC: 91 U/L (ref 40–129)
ALT SERPL-CCNC: 25 U/L (ref 0–40)
ANION GAP SERPL CALCULATED.3IONS-SCNC: 10 MMOL/L (ref 7–16)
AST SERPL-CCNC: 21 U/L (ref 0–39)
BILIRUB SERPL-MCNC: 0.5 MG/DL (ref 0–1.2)
BUN BLDV-MCNC: 7 MG/DL (ref 6–20)
CALCIUM SERPL-MCNC: 9.9 MG/DL (ref 8.6–10.2)
CHLORIDE BLD-SCNC: 101 MMOL/L (ref 98–107)
CHOLESTEROL, TOTAL: 156 MG/DL (ref 0–199)
CO2: 30 MMOL/L (ref 22–29)
CREAT SERPL-MCNC: 1 MG/DL (ref 0.7–1.2)
FERRITIN: 413 NG/ML
FOLATE: 15.7 NG/ML (ref 4.8–24.2)
GFR AFRICAN AMERICAN: >60
GFR NON-AFRICAN AMERICAN: >60 ML/MIN/1.73
GLUCOSE BLD-MCNC: 124 MG/DL (ref 74–99)
HCT VFR BLD CALC: 41.4 % (ref 37–54)
HEMOGLOBIN: 13.9 G/DL (ref 12.5–16.5)
MCH RBC QN AUTO: 25.9 PG (ref 26–35)
MCHC RBC AUTO-ENTMCNC: 33.6 % (ref 32–34.5)
MCV RBC AUTO: 77.2 FL (ref 80–99.9)
PDW BLD-RTO: 14.4 FL (ref 11.5–15)
PLATELET # BLD: 347 E9/L (ref 130–450)
PMV BLD AUTO: 10.1 FL (ref 7–12)
POTASSIUM SERPL-SCNC: 4.6 MMOL/L (ref 3.5–5)
PREALBUMIN: 18 MG/DL (ref 20–40)
RBC # BLD: 5.36 E12/L (ref 3.8–5.8)
SODIUM BLD-SCNC: 141 MMOL/L (ref 132–146)
TOTAL PROTEIN: 8.1 G/DL (ref 6.4–8.3)
TRIGL SERPL-MCNC: 160 MG/DL (ref 0–149)
VITAMIN B-12: 883 PG/ML (ref 211–946)
VITAMIN D 25-HYDROXY: 39 NG/ML (ref 30–100)
WBC # BLD: 9.7 E9/L (ref 4.5–11.5)

## 2021-02-04 PROCEDURE — 80053 COMPREHEN METABOLIC PANEL: CPT

## 2021-02-04 PROCEDURE — 84425 ASSAY OF VITAMIN B-1: CPT

## 2021-02-04 PROCEDURE — 84478 ASSAY OF TRIGLYCERIDES: CPT

## 2021-02-04 PROCEDURE — 82746 ASSAY OF FOLIC ACID SERUM: CPT

## 2021-02-04 PROCEDURE — 82607 VITAMIN B-12: CPT

## 2021-02-04 PROCEDURE — 82465 ASSAY BLD/SERUM CHOLESTEROL: CPT

## 2021-02-04 PROCEDURE — 82728 ASSAY OF FERRITIN: CPT

## 2021-02-04 PROCEDURE — 84134 ASSAY OF PREALBUMIN: CPT

## 2021-02-04 PROCEDURE — 84630 ASSAY OF ZINC: CPT

## 2021-02-04 PROCEDURE — 36415 COLL VENOUS BLD VENIPUNCTURE: CPT

## 2021-02-04 PROCEDURE — 82306 VITAMIN D 25 HYDROXY: CPT

## 2021-02-04 PROCEDURE — 85027 COMPLETE CBC AUTOMATED: CPT

## 2021-02-07 LAB — ZINC: 86.2 UG/DL (ref 60–120)

## 2021-02-08 LAB — VITAMIN B1 WHOLE BLOOD: 124 NMOL/L (ref 70–180)

## 2021-02-10 ENCOUNTER — OFFICE VISIT (OUTPATIENT)
Dept: BARIATRICS/WEIGHT MGMT | Age: 41
End: 2021-02-10
Payer: MEDICARE

## 2021-02-10 ENCOUNTER — INITIAL CONSULT (OUTPATIENT)
Dept: BARIATRICS/WEIGHT MGMT | Age: 41
End: 2021-02-10
Payer: MEDICARE

## 2021-02-10 VITALS — WEIGHT: 309 LBS | HEIGHT: 72 IN | BODY MASS INDEX: 41.85 KG/M2

## 2021-02-10 VITALS
HEART RATE: 94 BPM | HEIGHT: 72 IN | RESPIRATION RATE: 18 BRPM | SYSTOLIC BLOOD PRESSURE: 129 MMHG | WEIGHT: 309 LBS | DIASTOLIC BLOOD PRESSURE: 85 MMHG | BODY MASS INDEX: 41.85 KG/M2 | TEMPERATURE: 97.2 F

## 2021-02-10 DIAGNOSIS — Z71.3 DIETARY COUNSELING: Primary | ICD-10-CM

## 2021-02-10 DIAGNOSIS — E66.01 MORBID OBESITY DUE TO EXCESS CALORIES (HCC): ICD-10-CM

## 2021-02-10 DIAGNOSIS — K91.2 MALNUTRITION FOLLOWING GASTROINTESTINAL SURGERY: Primary | ICD-10-CM

## 2021-02-10 PROCEDURE — 99024 POSTOP FOLLOW-UP VISIT: CPT | Performed by: SURGERY

## 2021-02-10 PROCEDURE — 99212 OFFICE O/P EST SF 10 MIN: CPT

## 2021-02-10 PROCEDURE — 99999 PR OFFICE/OUTPT VISIT,PROCEDURE ONLY: CPT

## 2021-02-10 PROCEDURE — 99211 OFF/OP EST MAY X REQ PHY/QHP: CPT

## 2021-02-10 RX ORDER — NYSTATIN 100000 [USP'U]/G
POWDER TOPICAL
Qty: 1 BOTTLE | Refills: 2 | Status: SHIPPED
Start: 2021-02-10 | End: 2021-11-17

## 2021-02-10 NOTE — PATIENT INSTRUCTIONS
Please continue to take your vitamin and mineral supplements as instructed. If you received a blood work prescription today for laboratory monitoring due prior to your next routine follow-up visit, please have this blood work obtained 10 to 14 days prior to your next visit. It is important to fast for 12 hours prior to routine weight loss surgery blood work, EXCEPT for drinking water, to ensure accuracy of results. Please report nausea, vomiting, abdominal pain, or any other problems you experience to your surgeon. For problems related to weight loss surgery, it is best to go to [address] [place] [address] Emergency Department and have your surgeon paged.

## 2021-02-10 NOTE — PROGRESS NOTES
3 mth LRYGB f/u, labs are in [address] and is scheduled with Renzo for dietary. Water intake is 64 oz daily, vitamin intake is good, having bowel movements, getting protein through shakes and foods. Pt states that since he had his gallbladder removed he feels like bile comes up and then has diarrhea and notices that his nose runs after he eats. Pt has an incision that he would like to have checked because he sleeps on his side and is afraid it will open up.  He also states that he has an area under his belly that he would like checked because it is red and has to use antifungal cream.

## 2021-02-10 NOTE — PROGRESS NOTES
Ute Naranjo  2/10/2021  Laparoscopic sleeve gastrectomy   3 months Post-Operative Follow-up. 2 weeks s/p lap nita      Subjective:   Ute Naranjo is a 35 y.o. male three months post Laparoscopic sleeve gastrectomy. He reports no issues but having runny nose. Reports no problems with eating, bowel movements, voiding, or the wounds. He is not having swallowing difficulty, is compliant most of the time with the multivitamins and calcium + Vit D. He is meeting fluid recommendations of at least 64 ounces per day and is meeting protein recommendations. Exercise: no regular exercise. Weight=(!) 309 lb (140.2 kg)  Today's weight represents a total weight loss to date of 53 pounds. Prior to Admission medications    Medication Sig Start Date End Date Taking? Authorizing Provider   docusate sodium (COLACE) 100 MG capsule Take 100 mg by mouth 3 times daily as needed for Constipation    Historical Provider, MD   sertraline (ZOLOFT) 50 MG tablet Take 1 tablet by mouth nightly 12/15/20   Jose Umana MD   ondansetron (ZOFRAN-ODT) 4 MG disintegrating tablet Take 1 tablet by mouth every 8 hours as needed for Nausea or Vomiting 12/15/20   Jose Umana MD   Multiple Vitamin (MVI, BARIATRIC ADVANTAGE MULTI-FORMULA, CHEW TAB) Take 1 tablet by mouth daily    Historical Provider, MD   Calcium Carbonate (CALCI-CHEW PO) Take 1 tablet by mouth 3 times daily    Historical Provider, MD   Ferrous Sulfate (IRON PO) Take 1 tablet by mouth daily    Historical Provider, MD   Cholecalciferol (VITAMIN D3) 125 MCG (5000 UT) TABS Take 1 tablet by mouth daily    Historical Provider, MD   omeprazole (PRILOSEC) 20 MG delayed release capsule Take 1 capsule by mouth daily 10/21/20 10/21/21  Asad Webber MD   blood glucose monitor strips Accucheck meter-pt check BS daily. 9/3/20   Jose Umana MD   Lancets MISC Accucheck or what insurance covers.  9/3/20   Jose Umana MD Misc. Devices MISC Give one Accucheck glucometer monitor or whatever the insurance approves. 9/1/17   Francesco Cates MD   LORATADINE PO Take 10 mg by mouth as needed    Historical Provider, MD          Physical exam:   /85 (Site: Left Upper Arm, Position: Sitting, Cuff Size: Large Adult)   Pulse 94   Temp 97.2 °F (36.2 °C) (Temporal)   Resp 18   Ht 6' (1.829 m)   Wt (!) 309 lb (140.2 kg)   BMI 41.91 kg/m²   General appearance: alert, appears stated age and cooperative  Head: Normocephalic, without obvious abnormality, atraumatic  Neck: no adenopathy, no carotid bruit, no JVD, supple, symmetrical, trachea midline and thyroid not enlarged, symmetric, no tenderness/mass/nodules  Lungs: clear to auscultation bilaterally  Heart: regular rate and rhythm, S1, S2 normal, no murmur, click, rub or gallop  Abdomen: soft, non-tender; bowel sounds normal; no masses,  no organomegaly  Extremities: extremities normal, atraumatic, no cyanosis or edema    Assessment: doing well 3 months post Laparoscopicsleeve gastrectomy and lap nita 2 weeks . Plan:  Keep up good work. Continue eating a soft, high protein, low calorie diet. Eat small portions very slowly and chew well before swallowing. Drink plenty of water,  include functional foods. Try to exercise more, at least 30 minutes per day, 7 days per week. Follow up in 3 months and contact me if questions arise in the meantime. Start using fiber therapy such as Metamucil twice a day to prevent constipation. Bowels must move every day.       Physician Signature: Electronically signed by Dr. Wilfredo Cortes MD

## 2021-02-10 NOTE — PROGRESS NOTES
Medical Nutrition Therapy (MNT) Assessment     Pt. Name: Brian Anthony   Date:2/10/2021  F/U Appt: Sx Type 3 mo LSG     Food Records Kept: No  24Hour Recall Completed at Office:No    Ht 6' (1.829 m)   Wt (!) 309 lb (140.2 kg)   BMI 41.91 kg/m²  Height: 6' (1.829 m) Weight: (!) 309 lb (140.2 kg)   IBW:ideal body weight   164lbs  % EBWL: 27%     Wt Readings from Last 3 Encounters:   02/10/21 (!) 309 lb (140.2 kg)   02/10/21 (!) 309 lb (140.2 kg)   01/23/21 (!) 325 lb (147.4 kg)                     Protein supplements: Pt. is currently using the following protein supplement Nectar and consuming the following grams of protein 85 plus. Rd / Ld reviewed with patient based on 1.0 gram per kg of IBW patient needs 75-85 grams of protein total daily.       Subjective:                   Current MNT:       Bariatric soft diet with MVI, Calcium & Protein Supplements     MNT Advanced to:     Bariatric soft diet introducing raw fruit, raw vegetables, rice, protein bars, multivitamin, calcium, protein supplements      Allergies and Food Allergies and Food Intolerances:  none    Nutritional Data  No - Poor appetite more than 5 days     No - NPO or clear liquid more than 3 days     No - Problem Chewing      No - Problem Swallowing      No - Problem Mouth Pain      No - Problem Denture  No - Missing Teeth         No - Pressure Sore    No - Open Wound    No - Surgical Wound  No - Documented: Sepsis or Infection    No - Nausea  No - Vomiting    SWLC Bowel Protocol:  Patient states he / she has the following bowel movements per week 7  no - Diarrhea  No - Steatorrhea  No - Constipation  When was your last bowel movement today  How much plain water are you drinking daily   64 oz (water and/or Koolaid SF)  What other beverages / fluids are you drinking daily and the amount Koolaid,   Yes - Are you taking Colace daily  What amount of Colace are you taking daily 2  No - Are you taking Sugar Free Chewable Fiber Gummies What amount of Sugar Free Chewable Fiber Gummies are you taking daily none  Yes - Did your surgeon discuss constipation with you? No - Did your surgeon discuss increasing water intake? How much water were you instructed to take daily? Continue current  No - Did your surgeon discuss continuing Colace? How much Colace did your surgeon instruct you to take? n/a  No - Did your surgeon discuss stopping Colace? No - Did your surgeon discuss starting Fiber Gummies? How much Fiber Gummies did your surgeon instruct you to take? n/a  Did your surgeon discuss any other medications / supplements to take daily to move your bowels and avoid constipation? no  No Patient was provided today the Constipation Handout  Yes Rd Ld reinforced pt needs to consume the following - Water Intake should be 64 oz plus, Fiber Chews prn, Dietary Fiber Intake 25 grams plus        No - Hair loss   No - Weight regain       No - Acid Reflux  No - Dumping Syndrome      No - Food gets stuck  Yes  - Are you eating solids - should not be eating solids until 6 weeks post-op. no - Night Time Coughing  not applicable -  B Iris Noted  Yes - Are you chewing thoroughly  - Does not take effect until 6 weeks post-op  Yes - Are you hungry after eating   Very mildly hungry after eating     How many meals a day 5-6 / Portions Sizes of Meals are 1/3 cup max         Labs:  Glucose 124H, triglycerides 160H, prealbumin 18L    Supplements: Bariatric Advantage Multi-Vitamin 1 tablet 2 times Daily, Bariatric Advantage 29 mgs Iron 1 tablet Daily, Bariatric Advanatge Calcium Lozenges 1 tablet 3 times Daily , Dry Vitamin D3 5,000iu every day    Estimated Daily Nutritional Needs: Based on Bariatric procedure for Wt.  Loss  Energy: Will be calculated at Maintenance Stage    Protein: 60  80 gms Daily · Rd / Ld encouraged patient to exercise at least 30 minutes daily  · Rd / Ld instructed patient on how to increase oral protein intake within the diet. Pt. can verbalize he / she will need to consume 60 - 80 grams. · Rd / Ld instructed the patient on how to increase the use of protein supplements within the diet. · Pt. was instructed on how to increase water intake. Patient will need to consume 48 - 64 oz. of just plain water in addition to 30 oz. of non-caloric beverages. · Handouts given to patient  · RD / LD encouraged oral intake    · RD / LD encouraged pt. to keep food records. · Pt. is able to verbalize diet concepts      No - Constipation Handout Given and Reviewed    No - High Fiber Handout Given and Reviewed      No - Ulcer Handout Given and Reviewed          No - Pt. was instructed to continue current MNT   Yes - Pt. diet was advanced to the following stage ( See above)   No - Supplements: initiated (See list below  - Pt. given instruction)     No - Supplemental foods:______________________  No - Pt. was placed on a altered meal schedule    Good - Dietary Compliance    Pt had gallbladder surgery 2 weeks ago. Doing fine. Pt is here 2 weeks early, so, his weight loss rate is on track for this time period.

## 2021-04-22 ENCOUNTER — HOSPITAL ENCOUNTER (OUTPATIENT)
Age: 41
Discharge: HOME | End: 2021-04-22
Payer: MEDICARE

## 2021-04-22 DIAGNOSIS — K91.2 MALNUTRITION FOLLOWING GASTROINTESTINAL SURGERY: ICD-10-CM

## 2021-04-22 LAB
ALBUMIN SERPL-MCNC: 4 G/DL (ref 3.5–5.2)
ALP BLD-CCNC: 111 U/L (ref 40–129)
ALT SERPL-CCNC: 16 U/L (ref 0–40)
ANION GAP SERPL CALCULATED.3IONS-SCNC: 8 MMOL/L (ref 7–16)
AST SERPL-CCNC: 14 U/L (ref 0–39)
BILIRUB SERPL-MCNC: 0.4 MG/DL (ref 0–1.2)
BUN BLDV-MCNC: 8 MG/DL (ref 6–20)
CALCIUM SERPL-MCNC: 9.8 MG/DL (ref 8.6–10.2)
CHLORIDE BLD-SCNC: 105 MMOL/L (ref 98–107)
CHOLESTEROL, TOTAL: 166 MG/DL (ref 0–199)
CO2: 30 MMOL/L (ref 22–29)
CREAT SERPL-MCNC: 1 MG/DL (ref 0.7–1.2)
FERRITIN: 246 NG/ML
FOLATE: 14 NG/ML (ref 4.8–24.2)
GFR AFRICAN AMERICAN: >60
GFR NON-AFRICAN AMERICAN: >60 ML/MIN/1.73
GLUCOSE BLD-MCNC: 115 MG/DL (ref 74–99)
HCT VFR BLD CALC: 40.8 % (ref 37–54)
HEMOGLOBIN: 13.5 G/DL (ref 12.5–16.5)
MCH RBC QN AUTO: 25.8 PG (ref 26–35)
MCHC RBC AUTO-ENTMCNC: 33.1 % (ref 32–34.5)
MCV RBC AUTO: 77.9 FL (ref 80–99.9)
PDW BLD-RTO: 14.8 FL (ref 11.5–15)
PLATELET # BLD: 260 E9/L (ref 130–450)
PMV BLD AUTO: 10.2 FL (ref 7–12)
POTASSIUM SERPL-SCNC: 4.1 MMOL/L (ref 3.5–5)
PREALBUMIN: 17 MG/DL (ref 20–40)
RBC # BLD: 5.24 E12/L (ref 3.8–5.8)
SODIUM BLD-SCNC: 143 MMOL/L (ref 132–146)
TOTAL PROTEIN: 7.7 G/DL (ref 6.4–8.3)
TRIGL SERPL-MCNC: 154 MG/DL (ref 0–149)
VITAMIN B-12: 839 PG/ML (ref 211–946)
VITAMIN D 25-HYDROXY: 42 NG/ML (ref 30–100)
WBC # BLD: 9 E9/L (ref 4.5–11.5)

## 2021-04-22 PROCEDURE — 82728 ASSAY OF FERRITIN: CPT

## 2021-04-22 PROCEDURE — 84478 ASSAY OF TRIGLYCERIDES: CPT

## 2021-04-22 PROCEDURE — 82607 VITAMIN B-12: CPT

## 2021-04-22 PROCEDURE — 84134 ASSAY OF PREALBUMIN: CPT

## 2021-04-22 PROCEDURE — 36415 COLL VENOUS BLD VENIPUNCTURE: CPT

## 2021-04-22 PROCEDURE — 84255 ASSAY OF SELENIUM: CPT

## 2021-04-22 PROCEDURE — 82525 ASSAY OF COPPER: CPT

## 2021-04-22 PROCEDURE — 82306 VITAMIN D 25 HYDROXY: CPT

## 2021-04-22 PROCEDURE — 82746 ASSAY OF FOLIC ACID SERUM: CPT

## 2021-04-22 PROCEDURE — 82465 ASSAY BLD/SERUM CHOLESTEROL: CPT

## 2021-04-22 PROCEDURE — 84630 ASSAY OF ZINC: CPT

## 2021-04-22 PROCEDURE — 80053 COMPREHEN METABOLIC PANEL: CPT

## 2021-04-22 PROCEDURE — 85027 COMPLETE CBC AUTOMATED: CPT

## 2021-04-22 PROCEDURE — 84425 ASSAY OF VITAMIN B-1: CPT

## 2021-04-25 LAB
COPPER: 140.9 UG/DL (ref 70–140)
SELENIUM: 160.7 UG/L (ref 23–190)
ZINC: 66.2 UG/DL (ref 60–120)

## 2021-04-28 LAB — VITAMIN B1 WHOLE BLOOD: 152 NMOL/L (ref 70–180)

## 2021-05-05 ENCOUNTER — INITIAL CONSULT (OUTPATIENT)
Dept: BARIATRICS/WEIGHT MGMT | Age: 41
End: 2021-05-05
Payer: MEDICARE

## 2021-05-05 ENCOUNTER — OFFICE VISIT (OUTPATIENT)
Dept: BARIATRICS/WEIGHT MGMT | Age: 41
End: 2021-05-05
Payer: MEDICARE

## 2021-05-05 VITALS — BODY MASS INDEX: 40.63 KG/M2 | WEIGHT: 300 LBS | HEIGHT: 72 IN

## 2021-05-05 VITALS
SYSTOLIC BLOOD PRESSURE: 124 MMHG | DIASTOLIC BLOOD PRESSURE: 86 MMHG | HEIGHT: 72 IN | RESPIRATION RATE: 20 BRPM | WEIGHT: 300 LBS | HEART RATE: 90 BPM | TEMPERATURE: 97 F | BODY MASS INDEX: 40.63 KG/M2

## 2021-05-05 DIAGNOSIS — E66.01 MORBID OBESITY DUE TO EXCESS CALORIES (HCC): ICD-10-CM

## 2021-05-05 DIAGNOSIS — Z71.3 DIETARY COUNSELING: Primary | ICD-10-CM

## 2021-05-05 DIAGNOSIS — M25.551 RIGHT HIP PAIN: ICD-10-CM

## 2021-05-05 DIAGNOSIS — K91.2 MALNUTRITION FOLLOWING GASTROINTESTINAL SURGERY: Primary | ICD-10-CM

## 2021-05-05 PROCEDURE — 99213 OFFICE O/P EST LOW 20 MIN: CPT | Performed by: SURGERY

## 2021-05-05 PROCEDURE — G8427 DOCREV CUR MEDS BY ELIG CLIN: HCPCS | Performed by: SURGERY

## 2021-05-05 PROCEDURE — G8417 CALC BMI ABV UP PARAM F/U: HCPCS | Performed by: SURGERY

## 2021-05-05 PROCEDURE — 99999 PR OFFICE/OUTPT VISIT,PROCEDURE ONLY: CPT

## 2021-05-05 PROCEDURE — 99212 OFFICE O/P EST SF 10 MIN: CPT

## 2021-05-05 PROCEDURE — 1036F TOBACCO NON-USER: CPT | Performed by: SURGERY

## 2021-05-05 NOTE — PATIENT INSTRUCTIONS
Please continue to take your vitamin and mineral supplements as instructed. If you received a blood work prescription today for laboratory monitoring due prior to your next routine follow-up visit, please have this blood work obtained 10 to 14 days prior to your next visit. It is important to fast for 12 hours prior to routine weight loss surgery blood work, EXCEPT for drinking water, to ensure accuracy of results. Please report nausea, vomiting, abdominal pain, or any other problems you experience to your surgeon. For problems related to weight loss surgery, it is best to go to [address] [place] [address] Emergency Department and have your surgeon paged.

## 2021-05-05 NOTE — PROGRESS NOTES
Medical Nutrition Therapy (MNT) Assessment     Pt. Name: Amy Blackwood   Date:5/5/2021  F/U Appt: Sx Type 6 mo post op LSG     Food Records Kept: No  24Hour Recall Completed at Office:No    Ht 6' (1.829 m)   Wt 300 lb (136.1 kg)   BMI 40.69 kg/m²  Height: 6' (1.829 m) Weight: 300 lb (136.1 kg)   IBW:ideal body weight   164 lbs  % EBWL: 31%     Wt Readings from Last 3 Encounters:   05/05/21 300 lb (136.1 kg)   05/05/21 300 lb (136.1 kg)   02/10/21 (!) 309 lb (140.2 kg)                     Protein supplements: Pt. is currently using the following protein supplement Nectar and consuming the following grams of protein 100. Rd / Ld reviewed with patient based on 1.0 gram per kg of IBW patient needs 75-85 grams of protein total daily. Subjective:                   Current MNT:       Bariatric soft diet introducing raw fruit, raw vegetables, rice, protein bars, multivitamin, calcium, protein supplements     MNT Advanced to:     Bariatric soft diet introducing raw fruit, raw vegetables, rice, protein bars, multivitamin, calcium, protein supplements      Allergies and Food Allergies and Food Intolerances:  [place] allergy, bread and pasta not well tolerated    Nutritional Data  No - Poor appetite more than 5 days     No - NPO or clear liquid more than 3 days     No - Problem Chewing      No - Problem Swallowing      No - Problem Mouth Pain      No - Problem Denture  No - Missing Teeth         No - Pressure Sore    No - Open Wound    No - Surgical Wound  No - Documented: Sepsis or Infection    No - Nausea  No - Vomiting    SWLC Bowel Protocol:  Patient states he / she has the following bowel movements per week 7  no - Diarrhea  No - Steatorrhea  No - Constipation    No Patient was provided today the Constipation Handout  Yes Rd Ld reinforced pt needs to consume the following - Water Intake should be 64 oz , Fiber Chews prn, Dietary Fiber Intake 25 grams daily or more         No - Hair loss   Very mild thinning?   No - Weight regain       No - Acid Reflux  Yes - Dumping Syndrome   Sweets causes it      No - Food gets stuck      Protein foods at times  Yes - Are you eating solids - should not be eating solids until 6 weeks post-op. no - Night Time Coughing  no -  B Ping Noted  Yes - Are you chewing thoroughly  - Does not take effect until 6 weeks post-op  No - Are you hungry after eating     How many meals a day 6 / Portions Sizes of Meals are 5 - 6 oz          Labs:  4/22/21:   Glucose 115H, copper 140.9H, prealbumin 17L, ttriglycerides 154H    Supplements: Bariatric Advantage Multi-Vitamin 1 tablet 2 times Daily, Bariatric Advantage 29 mgs Iron 1 tablet Daily, Bariatric Advanatge Calcium Lozenges 1 tablet 3 times Daily , Dry Vitamin D3 5,000iu every day    Estimated Daily Nutritional Needs: Based on Bariatric procedure for Wt. Loss  Energy: Will be calculated at Maintenance Stage    Protein: 60  80 gms Daily    MNT Plan and Additional Education: RD/LD reviewed Bariatric Soft Diet incorporating in Raw Fruits, Raw Vegetables, Red Meat, and Rice. Encouraged pt to meet protein and fluid needs daily. Handouts given. Pt. verbalized understanding. Pt instructed to call with questions. MEDICAL NUTRITION THERAPY (MNT)  Nutrition Care Plan   (The patient has been educated and given written education material that reinforces the following dietary guidelines for Bariatric Surgery)  Goal:  Patient able to verbalize the following dietary concepts:  3 to 4 ounce portions per meal     6 small meals daily      60 to 80 grams of protein   48 to 64 ounces of fluid daily (water)     Always consume protein item first with all meals   Pt is aware wt loss is pt controlled.    Slow Meals  30-35 chews per bite / Meals 30  45 minutes long            The RD / LD reviewed with the patient that the dietary goals of the bariatric patient is to ensure that patient is able to meet established nutritional needs for a Bariatric Surgery  Patient foods:______________________  No - Pt. was placed on a altered meal schedule    Fair - Dietary Compliance

## 2021-05-05 NOTE — PROGRESS NOTES
Patient here for 6 month post LSG. Having skin issues. Denies reflux. Having hip difficulty's right hip.

## 2021-05-05 NOTE — PROGRESS NOTES
Mikaela Ozuna  5/5/2021  Laparoscopic sleeve gastrectomy  6 months Post-Operative Follow-up. Subjective:   Mikaela Ozuna is a 35 y.o. male six months post Laparoscopic sleeve gastrectomy  . No problems with surgery but having right hip pain  He is not having swallowing difficulty, is compliant most of the time with the multivitamins and calcium + Vit D. He is meeting fluid recommendations of at least 64 ounces per day and is meeting protein recommendations. Exercise: no regular exercise. Weight=300 lb (136.1 kg)  Today's weight represents a weight loss to date of 62 pounds. Allergies: Latex, Food, and Seasonal   Prior to Admission medications    Medication Sig Start Date End Date Taking? Authorizing Provider   nystatin (MYCOSTATIN) 451858 UNIT/GM powder Apply 3 times daily. 2/10/21  Yes Carin Peacock MD   sertraline (ZOLOFT) 50 MG tablet Take 1 tablet by mouth nightly 12/15/20  Yes Joey Dangelo MD   Multiple Vitamin (MVI, BARIATRIC ADVANTAGE MULTI-FORMULA, CHEW TAB) Take 1 tablet by mouth daily   Yes Historical Provider, MD   Calcium Carbonate (CALCI-CHEW PO) Take 1 tablet by mouth 3 times daily   Yes Historical Provider, MD   Ferrous Sulfate (IRON PO) Take 1 tablet by mouth daily   Yes Historical Provider, MD   Cholecalciferol (VITAMIN D3) 125 MCG (5000 UT) TABS Take 1 tablet by mouth daily   Yes Historical Provider, MD   omeprazole (PRILOSEC) 20 MG delayed release capsule Take 1 capsule by mouth daily 10/21/20 10/21/21 Yes Carin Peacock MD   blood glucose monitor strips Accucheck meter-pt check BS daily. 9/3/20  Yes Joey Dangelo MD   [facility]. Devices MISC Give one Accucheck glucometer monitor or whatever the insurance approves. 9/1/17  Yes Isaac Subramanian MD   LORATADINE PO Take 10 mg by mouth as needed   Yes Historical Provider, MD   Lancets MISC Accucheck or what insurance covers.  9/3/20   Joey Dangelo MD           Physical exam:   /86 (Site: Left Upper Arm, Position: Sitting, Cuff Size: Medium Adult)   Pulse 90   Temp 97 °F (36.1 °C) (Temporal)   Resp 20   Ht 6' (1.829 m)   Wt 300 lb (136.1 kg)   BMI 40.69 kg/m²   General appearance: alert, appears stated age and cooperative  Head: Normocephalic, without obvious abnormality, atraumatic  Neck: no adenopathy, no carotid bruit, no JVD, supple, symmetrical, trachea midline and thyroid not enlarged, symmetric, no tenderness/mass/nodules  Lungs: clear to auscultation bilaterally  Heart: regular rate and rhythm  Abdomen: soft, non-tender; bowel sounds normal; no masses,  no organomegaly  Extremities: extremities normal, atraumatic, no cyanosis or edema    Assessment:  6 months post Laparoscopic sleeve gastrectomy. He does not complain of GERD,  does not have sleep apnea,  does not have diabetes,  does not have hypertension off medical treatment. Cholesterol and triglycerides are normal.    Plan: Will get xray of hip and refer back to PCP for continued workup. Continue to eat small portions very slowly and chew well before swallowing. High protein, low calorie diet. Drink plenty of water. Try to exercise more, maintain adequate variety and balance. Follow up in 6 months. Always call the clinic if you have any medical problems. Stop the Omeprazole or other acid reducing medicine if possible. If ulcer pain or acid reflux symptoms start, restart the Omeprazole and call the clinic.       Physician Signature: Electronically signed by Dr. Brittanie Decker MD

## 2021-05-15 DIAGNOSIS — F33.9 RECURRENT MAJOR DEPRESSIVE DISORDER, REMISSION STATUS UNSPECIFIED (HCC): ICD-10-CM

## 2021-05-15 DIAGNOSIS — R11.2 POST-OPERATIVE NAUSEA AND VOMITING: ICD-10-CM

## 2021-05-15 DIAGNOSIS — Z98.890 POST-OPERATIVE NAUSEA AND VOMITING: ICD-10-CM

## 2021-05-17 RX ORDER — ONDANSETRON 4 MG/1
TABLET, ORALLY DISINTEGRATING ORAL
Qty: 10 TABLET | Refills: 2 | OUTPATIENT
Start: 2021-05-17

## 2021-10-20 NOTE — PROGRESS NOTES
OFFICE VISIT     PRIMARY CARE PHYSICIAN:      Quinn Santoyo MD       ALLERGIES / SENSITIVITIES:        Allergies   Allergen Reactions    Latex     Food Other (See Comments)     Walnuts - Mouth gets sores and pt becomes dry.  Seasonal      Takes Claritin           REVIEWED MEDICATIONS:        Current Outpatient Medications:     Multiple Vitamin (MVI, CELEBRATE, CHEWABLE TABLET), Take 1 tablet by mouth daily, Disp: , Rfl:     vitamin D (ERGOCALCIFEROL) 1.25 MG (00957 UT) CAPS capsule, Take 1 capsule by mouth once a week, Disp: 12 capsule, Rfl: 1    lisinopril (PRINIVIL;ZESTRIL) 10 MG tablet, Take 1 tablet by mouth daily, Disp: 90 tablet, Rfl: 0    sertraline (ZOLOFT) 50 MG tablet, Take 1 tablet by mouth daily, Disp: 90 tablet, Rfl: 0    metFORMIN (GLUCOPHAGE) 1000 MG tablet, Take 1 tablet by mouth 2 times daily (with meals), Disp: 180 tablet, Rfl: 0    LORATADINE PO, Take 10 mg by mouth as needed, Disp: , Rfl:     blood glucose monitor strips, Accucheck meter-pt check BS daily. , Disp: 100 strip, Rfl: 2    Lancets MISC, Accucheck or what insurance covers. , Disp: 100 each, Rfl: 2    Misc. Devices MISC, Give one Accucheck glucometer monitor or whatever the insurance approves. , Disp: 1 Device, Rfl: 0      S: REASON FOR VISIT:       Chief Complaint   Patient presents with    Cardiac Clearance     Consult, per Dr Da Gorves, to obtain cardiac clearance for bariatric surgery. Labs 6/20. Is c/o SOBOE. No other complaints.  Shortness of Breath          History of Present Illness:       New consult for preop cardiac evaluation for surgery   40 yr pt with HX of HTN, DM, dyslipidemia, WILL referred for cardiac clearance for his Bariatric surgery   C/o mild SOBOE, no PND   Patient is compliant with all medications   Calvin any exertional chest pain    No palpitations, dizzy or syncope.    Active at home   No orthopnea   Try to watch diet          Past Medical History:   Diagnosis Date    Benign essential HTN     Depression     Diabetes mellitus type 2 in obese ([facility] [address]     GERD without esophagitis     Hyperlipemia     Latex allergy     Morbid obesity due to excess calories (HCC)     WILL on CPAP     10            Past Surgical History:   Procedure Laterality Date    UPPER GASTROINTESTINAL ENDOSCOPY N/A 6/19/2020    EGD BIOPSY performed by Marcy Hussein MD at [address] [facility] History   Problem Relation Age of Onset    Uterine Cancer Mother     Thyroid Disease Mother     Diabetes Mother     Tuberculosis Father     Heart Attack Maternal Grandfather     Heart Attack Other     Thyroid Disease Brother     No Known Problems Brother           Social History     Tobacco Use    Smoking status: Never Smoker    Smokeless tobacco: Never Used   Substance Use Topics    Alcohol use: Not Currently         Review of Systems:  Constitutional: negative for fever and chills, or significant weight loss  HEENT: negative for acute visual symptoms or auditory problems, no dysphagia  Respiratory: negative for cough, wheezing, or hemoptysis  Cardiovascular: negative for chest pain, palpitations, and +ve dyspnea  Gastrointestinal: negative for abdominal pain, diarrhea, nausea and vomiting  Endocrine: Negative for polyuria and polydyspsia  Genitourinary:negative for dysuria and hematuria  Derm: negative for rash and skin lesion(s)  Neurological: negative for tingling, numbness, weakness, seizures and tremors  Endocrine: negative for polydipsia and polyuria  Musculoskeletal: negative for pain or tenderness  Psychiatric: negative for anxiety, depression, or suicidal ideations         O:  COMPLETE PHYSICAL EXAM:       /64 (Site: Left Upper Arm, Position: Sitting, Cuff Size: Large Adult)   Pulse 81   Resp 20   Ht 6' (1.829 m)   Wt (!) 368 lb 12.8 oz (167.3 kg)   SpO2 95%   BMI 50.02 kg/m²       General:   Patient alert, comfortable, no distress. Appears stated age.    HEENT:    Pupils equal, no icterus, no nasal drainage, tongue moist.   Neck:              No masses, Thyroid not palpable. No elevated JVD, No carotid bruit. Chest:   Normal configuration, non tender. Lungs:   Clear to auscultation bilaterally, few scattered rhonchi. Cardiovascular:  Regular rhythm, 1/6 systolic murmur, No S3,  no palpable thrills,    Abdomen:  Soft, Morbidly obese, Bowel sounds normal, can not feel any pulsatile abdominal aorta   Extremities:  No edema. Distal pulses palpable. No cyanosis, no clubbing. Skin:   Good turgor, warm and dry, no cyanosis. Musculoskeletal: No joint swelling or deformity. Neuro:   Cranial nerves grossly intact; No focal neurologic deficit. Psych:   Alert, good mood and effect. REVIEW OF DIAGNOSTIC TESTS:        Electrocardiogram: NSR             A/P:   ASSESSMENT / PLAN:    Fiona Brandt was seen today for cardiac clearance and shortness of breath. Diagnoses and all orders for this visit:    Preop cardiovascular exam - good functional capacity, Letter of cardiac clearance to Dr Sly Treadwell after stress test  -     EKG 12 Lead  -     NM Cardiac Stress Test Nuclear Imaging; Future  -     Cardiac Stress Test Exercise- Treadmill; Future    Morbid obesity with BMI of 50.0-59.9, adult (HCC) - Diet, exercise and weight loss discussed - Awaiting bariatric surgery    Essential hypertension - Controlled  -     NM Cardiac Stress Test Nuclear Imaging; Future  -     Cardiac Stress Test Exercise- Treadmill; Future    Controlled type 2 diabetes mellitus without complication, without long-term current use of insulin ([facility] [address]  -     NM Cardiac Stress Test Nuclear Imaging; Future  -     Cardiac Stress Test Exercise- Treadmill;  Future    Dyslipidemia - Low cholesterol diet, exercise as tolerates and weight loss discussed - Statin therapy defer to Dr Elizabeth Garrett    WILL (obstructive sleep apnea) - On CPAP    Gastroesophageal reflux disease without esophagitis    Shortness of breath  -     NM Cardiac Stress Test Nuclear Imaging; Future    Other orders  -     Beta Blocker Management Prior to Cardiac Stress Test; Standing  -     Beta Blocker Management Prior to Cardiac Stress Test       Preventive Cardiology: Low cholesterol diet, regular exercise as tolerate, and gradual weight loss discussed. Monitor BP and heart rates. Above recommendations discussed with him   All questions answered about cardiac diagnoses and cardiac medications. Continue current medications. Compliance with medications and f/u with all physicians discussed. Risk factor modification based on risk profile discussed. Call if any exertional chest pain, short of breath, dizzy or palpitations   Follow up in 12 months or earlier if needed.    Call with stress test results      [id number] [facility]  [address], [place], [address] [facility]  [phone] The patient is a 25y year old Male complaining of multiple medical complaints.

## 2021-11-09 ENCOUNTER — HOSPITAL ENCOUNTER (OUTPATIENT)
Dept: GENERAL RADIOLOGY | Age: 41
Discharge: HOME | End: 2021-11-11
Payer: MEDICARE

## 2021-11-09 ENCOUNTER — HOSPITAL ENCOUNTER (OUTPATIENT)
Age: 41
Discharge: HOME | End: 2021-11-11
Payer: MEDICARE

## 2021-11-09 ENCOUNTER — HOSPITAL ENCOUNTER (OUTPATIENT)
Age: 41
Discharge: HOME | End: 2021-11-09
Payer: MEDICARE

## 2021-11-09 DIAGNOSIS — M25.551 RIGHT HIP PAIN: ICD-10-CM

## 2021-11-09 LAB
ALBUMIN SERPL-MCNC: 4.1 G/DL (ref 3.5–5.2)
ALP BLD-CCNC: 93 U/L (ref 40–129)
ALT SERPL-CCNC: 21 U/L (ref 0–40)
ANION GAP SERPL CALCULATED.3IONS-SCNC: 8 MMOL/L (ref 7–16)
AST SERPL-CCNC: 16 U/L (ref 0–39)
BILIRUB SERPL-MCNC: 0.5 MG/DL (ref 0–1.2)
BUN BLDV-MCNC: 12 MG/DL (ref 6–20)
CALCIUM SERPL-MCNC: 9.8 MG/DL (ref 8.6–10.2)
CHLORIDE BLD-SCNC: 105 MMOL/L (ref 98–107)
CHOLESTEROL, TOTAL: 168 MG/DL (ref 0–199)
CO2: 29 MMOL/L (ref 22–29)
CREAT SERPL-MCNC: 1 MG/DL (ref 0.7–1.2)
FERRITIN: 182 NG/ML
FOLATE: 15.1 NG/ML (ref 4.8–24.2)
GFR AFRICAN AMERICAN: >60
GFR NON-AFRICAN AMERICAN: >60 ML/MIN/1.73
GLUCOSE BLD-MCNC: 125 MG/DL (ref 74–99)
HCT VFR BLD CALC: 44.3 % (ref 37–54)
HEMOGLOBIN: 14.7 G/DL (ref 12.5–16.5)
MCH RBC QN AUTO: 25.8 PG (ref 26–35)
MCHC RBC AUTO-ENTMCNC: 33.2 % (ref 32–34.5)
MCV RBC AUTO: 77.9 FL (ref 80–99.9)
PDW BLD-RTO: 14.1 FL (ref 11.5–15)
PLATELET # BLD: 245 E9/L (ref 130–450)
PMV BLD AUTO: 10.3 FL (ref 7–12)
POTASSIUM SERPL-SCNC: 4.3 MMOL/L (ref 3.5–5)
RBC # BLD: 5.69 E12/L (ref 3.8–5.8)
SODIUM BLD-SCNC: 142 MMOL/L (ref 132–146)
TOTAL PROTEIN: 7.5 G/DL (ref 6.4–8.3)
TRIGL SERPL-MCNC: 105 MG/DL (ref 0–149)
VITAMIN B-12: 692 PG/ML (ref 211–946)
VITAMIN D 25-HYDROXY: 40 NG/ML (ref 30–100)
WBC # BLD: 6.7 E9/L (ref 4.5–11.5)

## 2021-11-09 PROCEDURE — 84425 ASSAY OF VITAMIN B-1: CPT

## 2021-11-09 PROCEDURE — 80053 COMPREHEN METABOLIC PANEL: CPT

## 2021-11-09 PROCEDURE — 84255 ASSAY OF SELENIUM: CPT

## 2021-11-09 PROCEDURE — 84478 ASSAY OF TRIGLYCERIDES: CPT

## 2021-11-09 PROCEDURE — 84630 ASSAY OF ZINC: CPT

## 2021-11-09 PROCEDURE — 82306 VITAMIN D 25 HYDROXY: CPT

## 2021-11-09 PROCEDURE — 36415 COLL VENOUS BLD VENIPUNCTURE: CPT

## 2021-11-09 PROCEDURE — 82728 ASSAY OF FERRITIN: CPT

## 2021-11-09 PROCEDURE — 82607 VITAMIN B-12: CPT

## 2021-11-09 PROCEDURE — 82746 ASSAY OF FOLIC ACID SERUM: CPT

## 2021-11-09 PROCEDURE — 82525 ASSAY OF COPPER: CPT

## 2021-11-09 PROCEDURE — 85027 COMPLETE CBC AUTOMATED: CPT

## 2021-11-09 PROCEDURE — 73502 X-RAY EXAM HIP UNI 2-3 VIEWS: CPT

## 2021-11-09 PROCEDURE — 82465 ASSAY BLD/SERUM CHOLESTEROL: CPT

## 2021-11-11 ENCOUNTER — TELEPHONE (OUTPATIENT)
Dept: INTERNAL MEDICINE | Age: 41
End: 2021-11-11

## 2021-11-11 DIAGNOSIS — F33.9 RECURRENT MAJOR DEPRESSIVE DISORDER, REMISSION STATUS UNSPECIFIED (HCC): ICD-10-CM

## 2021-11-11 NOTE — TELEPHONE ENCOUNTER
Pt called in asking for refill on psych medication states he has been on for years. States Dr Radha Garcia ordered the med last. I explained that pt would need an appt due to not being seen since dec of 20 pt also advised of dr Lam Ocampo being here next week and he can come next mon or fri at 7:45 am to be seen as a walk in with Radha Garcia or come any mon or fri and see any aval pcp to reorder meds. Pt very upset stating every time he calls ecc tells him they can not schedule him . I explained the ecc to him.  Pt advised of walk in states he will walk in

## 2021-11-15 LAB
COPPER: 135.1 UG/DL (ref 70–140)
SELENIUM: 140.4 UG/L (ref 23–190)
VITAMIN B1 WHOLE BLOOD: 132 NMOL/L (ref 70–180)
ZINC: 75.3 UG/DL (ref 60–120)

## 2021-11-17 ENCOUNTER — OFFICE VISIT (OUTPATIENT)
Dept: BARIATRICS/WEIGHT MGMT | Age: 41
End: 2021-11-17
Payer: MEDICARE

## 2021-11-17 ENCOUNTER — TELEPHONE (OUTPATIENT)
Dept: BARIATRICS/WEIGHT MGMT | Age: 41
End: 2021-11-17

## 2021-11-17 ENCOUNTER — INITIAL CONSULT (OUTPATIENT)
Dept: BARIATRICS/WEIGHT MGMT | Age: 41
End: 2021-11-17

## 2021-11-17 VITALS
SYSTOLIC BLOOD PRESSURE: 145 MMHG | BODY MASS INDEX: 42.12 KG/M2 | RESPIRATION RATE: 20 BRPM | DIASTOLIC BLOOD PRESSURE: 80 MMHG | HEIGHT: 72 IN | WEIGHT: 311 LBS | TEMPERATURE: 97.7 F | HEART RATE: 89 BPM

## 2021-11-17 VITALS — HEIGHT: 72 IN | BODY MASS INDEX: 42.12 KG/M2 | WEIGHT: 311 LBS

## 2021-11-17 DIAGNOSIS — Z71.3 NUTRITIONAL COUNSELING: Primary | ICD-10-CM

## 2021-11-17 DIAGNOSIS — K91.2 MALNUTRITION FOLLOWING GASTROINTESTINAL SURGERY: Primary | ICD-10-CM

## 2021-11-17 DIAGNOSIS — M25.551 RIGHT HIP PAIN: ICD-10-CM

## 2021-11-17 DIAGNOSIS — Z00.8 NUTRITIONAL ASSESSMENT: ICD-10-CM

## 2021-11-17 PROCEDURE — 1036F TOBACCO NON-USER: CPT | Performed by: SURGERY

## 2021-11-17 PROCEDURE — 99211 OFF/OP EST MAY X REQ PHY/QHP: CPT

## 2021-11-17 PROCEDURE — G8484 FLU IMMUNIZE NO ADMIN: HCPCS | Performed by: SURGERY

## 2021-11-17 PROCEDURE — G8427 DOCREV CUR MEDS BY ELIG CLIN: HCPCS | Performed by: SURGERY

## 2021-11-17 PROCEDURE — 99999 PR OFFICE/OUTPT VISIT,PROCEDURE ONLY: CPT | Performed by: DIETITIAN, REGISTERED

## 2021-11-17 PROCEDURE — G8417 CALC BMI ABV UP PARAM F/U: HCPCS | Performed by: SURGERY

## 2021-11-17 PROCEDURE — 99213 OFFICE O/P EST LOW 20 MIN: CPT | Performed by: SURGERY

## 2021-11-17 RX ORDER — OMEPRAZOLE 20 MG/1
20 CAPSULE, DELAYED RELEASE ORAL
Qty: 60 CAPSULE | Refills: 3 | Status: SHIPPED
Start: 2021-11-17 | End: 2022-06-23 | Stop reason: SDUPTHER

## 2021-11-17 NOTE — PROGRESS NOTES
Weight Loss Assessment: Completed by Nutrition Services RD/LD Certified in Adult Weight Management:  Phone Number:  [phone]  Fax Number:   [phone]    Praneeth Vazquez   11/17/21  Weight Loss Appointment: 1 year RYGB F/U Appt - Back On Track      Wt Readings from Last 3 Encounters:   11/17/21 (!) 311 lb (141.1 kg)   11/17/21 (!) 311 lb (141.1 kg)   05/05/21 300 lb (136.1 kg)        (!) 311 lb (141.1 kg)  IBW: 164 lbs         % IBW: 190%       % EBWL: 25%           ABW: 200 lbs  % ABW: 156%       BMI: Body mass index is 42.18 kg/m². Patient's 24 Hour Recall:  Breakfast: Yogurt - Fat Free, 6 Frozen Raspberries - 1/2 cup  Snack: None  Lunch: Chicken Breast - 2/3 of a cup, Broccoli or (Mixed Vegetables) - 1 cup   Snack: Chicken Breast 2/3 of a cup  Dinner:Chicken Breast 2/3 of a cup   Mixed Vegetables 1 /2 cup  Snack: None  Water Intake: 1 Gallon  Other Beverages: Tea and 2 Cup's Coffee with Splenda  Exercise: ADL's -     Education:  Rd Walter educated pt on getting back on track. Pt was placed on the Low-Fat , Low-Calorie Portion Controlled Diet. No food records have been kept for today's appointment to assess nutritional status. Pt does verbalize he is not always complying with the diet and based on his 24 Hour Recall can verbalize he needs to decrease the volume of food he is consuming. Pt does verbalize he is going to go back to using a protein supplement as a  meal replacement to meet protein needs daily. Weight loss goal for next follow-up appointment: 285 lbs    Patient has established the following three goals for the next follow-up appointment. 1. Pt states he wants to add more variety of food into his current meal plan. 2. Pt states he wants to add a protein supplement back into his diet as a meal replacement. 3. Pt states he wants to increase PA.     Patient has established the following exercise goal for next follow-up appointment:  ADL's - Pt wants to start back on a PA program.    Did the patient keep food records:No    Pt. is aware if they do not comply with The [address] [place] [address] and [facility] Guidelines that this can lead to the patient being dismissed from the program.    The registered dietitian spent the following time 60 minutes educating the patient and providing the patient with nutritional handouts to follow. __________________________________________________________________________________  Primary Care Physician Follow-up:  Pt. was seen by Bridger Cabezas RD/MYRIAM regarding weight loss education and follow-up on 11/17/21. This was the patients 1st appointment with the registered dietitian. The registered dietitian spent the following amount of time with the patient 60 minutes. Please [place] the following: The Primary Care Physician reviewed the above nutrition assessment and patient education and agrees with current diet plan. The Primary Care Physician wants the current diet plan changed to the following:_____________________________________________________________________________________________________________________________________________________________________________________________________________. Physician Signature:__________________________ Date:______________  Once signed please fax back to the [facility] [phone]. We thank you for allowing us to participate in your patients care.

## 2021-11-17 NOTE — PROGRESS NOTES
Patient is 1 yr LRYGB. Patient states that he gets in at least 1 gallon of water daily. Protein through foods. Vitamin intake is good. Bowel movements are good. Patient stated that he is concerned about his protein.

## 2021-11-17 NOTE — PROGRESS NOTES
Vikki Norwood  11/17/2021  Leon Babinski   Laparoscopic Sleeve Gastrectomy  1 Year Post-Operative Follow-up       Vikki Norwood is a 35 y.o. male 1 years post Laparoscopic Sleeve Gastrectomy. He reports some weight yo yoing, is not having swallowing difficulty, is noncompliant much of the time with the multivitamins and calcium + Vit D. He is meeting fluid recommendations of at least 64 ounces per day and is meeting protein recommendations. Heis not exercising: no regular exercise. Complains of right hip pain and we got xrays last time. Has gerd off prilosec. Weight=(!) 311 lb (141.1 kg)  Today's weight represents a total weight loss of 51 pounds since surgery and regained 11 pounds since the last visit. Prior to Admission medications    Medication Sig Start Date End Date Taking? Authorizing Provider   sertraline (ZOLOFT) 50 MG tablet take 1 tablet by mouth nightly 11/12/21  Yes Maxine Das, DO   Multiple Vitamin (MVI, BARIATRIC ADVANTAGE MULTI-FORMULA, CHEW TAB) Take 1 tablet by mouth daily   Yes Historical Provider, MD   Calcium Carbonate (CALCI-CHEW PO) Take 1 tablet by mouth 3 times daily   Yes Historical Provider, MD   Ferrous Sulfate (IRON PO) Take 1 tablet by mouth daily   Yes Historical Provider, MD   Cholecalciferol (VITAMIN D3) 125 MCG (5000 UT) TABS Take 1 tablet by mouth daily   Yes Historical Provider, MD   blood glucose monitor strips Accucheck meter-pt check BS daily. 9/3/20  Yes Kvng Hopper MD   Lancets MISC Accucheck or what insurance covers. 9/3/20  Yes Kvng Hopper MD   Misc. Devices MISC Give one Accucheck glucometer monitor or whatever the insurance approves.  9/1/17  Yes Cynthia Anderson MD   LORATADINE PO Take 10 mg by mouth as needed   Yes Historical Provider, MD          Physical exam:   BP (!) 145/80 (Site: Right Lower Arm, Position: Sitting, Cuff Size: Large Adult)   Pulse 89   Temp 97.7 °F (36.5 °C) (Temporal)   Resp 20   Ht 6' (1.829 m) Wt (!) 311 lb (141.1 kg)   BMI 42.18 kg/m²   General appearance: alert, appears stated age and cooperative  Head: Normocephalic, without obvious abnormality, atraumatic  Neck: no adenopathy, no carotid bruit, no JVD, supple, symmetrical, trachea midline and thyroid not enlarged, symmetric, no tenderness/mass/nodules  Lungs: clear to auscultation bilaterally  Heart: regular rate and rhythm  Abdomen: soft, non-tender; bowel sounds normal; no masses,  no organomegaly, no hernias  Extremities: extremities normal, atraumatic, no cyanosis or edema    Assessment: right hip pain post Laparoscopic Sleeve Gastrectomy. He does complain of GERD,  does not have sleep apnea,  does have diabetes,  does have hypertension off medical treatment. Cholesterol and triglycerides are normal. Malnutrition, hypoalbuminemia, right hip pain, gerd    Plan: Will give prilosec for gerd, refer to Dr. Phoebe Sanabria for right hip pain. Continue to eat a high protein, low calorie diet, eat small portions very slowly and chew well before swallowing. Drink plenty of water and fluids. Make sure to use fiber to keep the bowels regular. Try to exercise 7 days per week, maintain adequate variety and balance. Always notify the clinic if you have any medical problems. Follow up in 6 months.  Will check several lab panels      Physician Signature: Electronically signed by Dr. Ailyn Echevarria MD

## 2021-11-17 NOTE — PATIENT INSTRUCTIONS
Please continue to take your vitamin and mineral supplements as instructed. If you received a blood work prescription today for laboratory monitoring due prior to your next routine follow-up visit, please have this blood work obtained 10 to 14 days prior to your next visit. It is important to fast for 12 hours prior to routine weight loss surgery blood work, EXCEPT for drinking water, to ensure accuracy of results. Please report nausea, vomiting, abdominal pain, or any other problems you experience to your surgeon. For problems related to weight loss surgery, it is best to go to [address] [place] [address] Emergency Department and have your surgeon paged.

## 2021-12-09 ENCOUNTER — OFFICE VISIT (OUTPATIENT)
Dept: ORTHOPEDIC SURGERY | Age: 41
End: 2021-12-09
Payer: MEDICARE

## 2021-12-09 VITALS — BODY MASS INDEX: 42.66 KG/M2 | HEIGHT: 72 IN | WEIGHT: 315 LBS

## 2021-12-09 DIAGNOSIS — S76.011A STRAIN OF RIGHT HIP, INITIAL ENCOUNTER: ICD-10-CM

## 2021-12-09 DIAGNOSIS — Z71.82 EXERCISE COUNSELING: ICD-10-CM

## 2021-12-09 DIAGNOSIS — M70.61 TROCHANTERIC BURSITIS OF RIGHT HIP: Primary | ICD-10-CM

## 2021-12-09 PROCEDURE — G8427 DOCREV CUR MEDS BY ELIG CLIN: HCPCS | Performed by: ORTHOPAEDIC SURGERY

## 2021-12-09 PROCEDURE — 1036F TOBACCO NON-USER: CPT | Performed by: ORTHOPAEDIC SURGERY

## 2021-12-09 PROCEDURE — 99203 OFFICE O/P NEW LOW 30 MIN: CPT | Performed by: ORTHOPAEDIC SURGERY

## 2021-12-09 PROCEDURE — G8482 FLU IMMUNIZE ORDER/ADMIN: HCPCS | Performed by: ORTHOPAEDIC SURGERY

## 2021-12-09 PROCEDURE — G8417 CALC BMI ABV UP PARAM F/U: HCPCS | Performed by: ORTHOPAEDIC SURGERY

## 2021-12-20 ENCOUNTER — OFFICE VISIT (OUTPATIENT)
Dept: INTERNAL MEDICINE | Age: 41
End: 2021-12-20
Payer: MEDICARE

## 2021-12-20 VITALS
BODY MASS INDEX: 41.99 KG/M2 | TEMPERATURE: 98.1 F | RESPIRATION RATE: 22 BRPM | OXYGEN SATURATION: 98 % | HEART RATE: 78 BPM | WEIGHT: 310 LBS | SYSTOLIC BLOOD PRESSURE: 152 MMHG | DIASTOLIC BLOOD PRESSURE: 80 MMHG | HEIGHT: 72 IN

## 2021-12-20 DIAGNOSIS — G47.33 OSA ON CPAP: ICD-10-CM

## 2021-12-20 DIAGNOSIS — Z99.89 OSA ON CPAP: ICD-10-CM

## 2021-12-20 DIAGNOSIS — E66.01 MORBID OBESITY WITH BMI OF 50.0-59.9, ADULT (HCC): ICD-10-CM

## 2021-12-20 DIAGNOSIS — K21.9 GASTROESOPHAGEAL REFLUX DISEASE WITHOUT ESOPHAGITIS: ICD-10-CM

## 2021-12-20 DIAGNOSIS — F33.9 RECURRENT MAJOR DEPRESSIVE DISORDER, REMISSION STATUS UNSPECIFIED (HCC): Primary | ICD-10-CM

## 2021-12-20 DIAGNOSIS — E11.9 CONTROLLED TYPE 2 DIABETES MELLITUS WITHOUT COMPLICATION, WITHOUT LONG-TERM CURRENT USE OF INSULIN (HCC): ICD-10-CM

## 2021-12-20 DIAGNOSIS — Z23 INFLUENZA VACCINE NEEDED: ICD-10-CM

## 2021-12-20 PROBLEM — R11.2 INTRACTABLE NAUSEA AND VOMITING: Status: RESOLVED | Noted: 2021-01-23 | Resolved: 2021-12-20

## 2021-12-20 PROBLEM — K81.0 ACUTE CHOLECYSTITIS: Status: RESOLVED | Noted: 2021-01-25 | Resolved: 2021-12-20

## 2021-12-20 LAB — HBA1C MFR BLD: 5.4 %

## 2021-12-20 PROCEDURE — G8482 FLU IMMUNIZE ORDER/ADMIN: HCPCS | Performed by: INTERNAL MEDICINE

## 2021-12-20 PROCEDURE — 6360000002 HC RX W HCPCS

## 2021-12-20 PROCEDURE — G0008 ADMIN INFLUENZA VIRUS VAC: HCPCS

## 2021-12-20 PROCEDURE — G8427 DOCREV CUR MEDS BY ELIG CLIN: HCPCS | Performed by: INTERNAL MEDICINE

## 2021-12-20 PROCEDURE — 90686 IIV4 VACC NO PRSV 0.5 ML IM: CPT

## 2021-12-20 PROCEDURE — G8417 CALC BMI ABV UP PARAM F/U: HCPCS | Performed by: INTERNAL MEDICINE

## 2021-12-20 PROCEDURE — 99213 OFFICE O/P EST LOW 20 MIN: CPT | Performed by: INTERNAL MEDICINE

## 2021-12-20 PROCEDURE — 83036 HEMOGLOBIN GLYCOSYLATED A1C: CPT | Performed by: INTERNAL MEDICINE

## 2021-12-20 PROCEDURE — 2022F DILAT RTA XM EVC RTNOPTHY: CPT | Performed by: INTERNAL MEDICINE

## 2021-12-20 PROCEDURE — 1036F TOBACCO NON-USER: CPT | Performed by: INTERNAL MEDICINE

## 2021-12-20 SDOH — ECONOMIC STABILITY: FOOD INSECURITY: WITHIN THE PAST 12 MONTHS, YOU WORRIED THAT YOUR FOOD WOULD RUN OUT BEFORE YOU GOT MONEY TO BUY MORE.: SOMETIMES TRUE

## 2021-12-20 SDOH — ECONOMIC STABILITY: FOOD INSECURITY: WITHIN THE PAST 12 MONTHS, THE FOOD YOU BOUGHT JUST DIDN'T LAST AND YOU DIDN'T HAVE MONEY TO GET MORE.: SOMETIMES TRUE

## 2021-12-20 SDOH — ECONOMIC STABILITY: HOUSING INSECURITY
IN THE LAST 12 MONTHS, WAS THERE A TIME WHEN YOU DID NOT HAVE A STEADY PLACE TO SLEEP OR SLEPT IN A SHELTER (INCLUDING NOW)?: NO

## 2021-12-20 SDOH — ECONOMIC STABILITY: INCOME INSECURITY: IN THE LAST 12 MONTHS, WAS THERE A TIME WHEN YOU WERE NOT ABLE TO PAY THE MORTGAGE OR RENT ON TIME?: NO

## 2021-12-20 SDOH — ECONOMIC STABILITY: HOUSING INSECURITY: IN THE LAST 12 MONTHS, HOW MANY PLACES HAVE YOU LIVED?: 1

## 2021-12-20 ASSESSMENT — PATIENT HEALTH QUESTIONNAIRE - PHQ9
2. FEELING DOWN, DEPRESSED OR HOPELESS: MORE THAN HALF THE DAYS
1. LITTLE INTEREST OR PLEASURE IN DOING THINGS: NOT AT ALL
SUM OF ALL RESPONSES TO PHQ9 QUESTIONS 1 & 2: 2
DEPRESSION UNABLE TO ASSESS: YES

## 2021-12-20 ASSESSMENT — SOCIAL DETERMINANTS OF HEALTH (SDOH): HOW HARD IS IT FOR YOU TO PAY FOR THE VERY BASICS LIKE FOOD, HOUSING, MEDICAL CARE, AND HEATING?: VERY HARD

## 2021-12-20 ASSESSMENT — LIFESTYLE VARIABLES: HOW OFTEN DO YOU HAVE A DRINK CONTAINING ALCOHOL: NEVER

## 2021-12-20 NOTE — PATIENT INSTRUCTIONS
1. Continue current medication  2. Continue DASH diet and exercise  3. Lets meet again in a month to catch up on your weight and blood pressure, to decide on medication.  1/24/2022 at 3pm.

## 2021-12-20 NOTE — PROGRESS NOTES
[facility]    Attending Physician Statement  I have discussed the case, including pertinent history and exam findings with the resident physician. I agree with the assessment, plan and orders as documented by the resident. I have reviewed all pertinent PMHx, PSHx, FamHx, SocialHx, medications, and allergies and updated history as appropriate. Patient presents for routine follow up of medical problems. One year since last visit. DM - HbA1c - 5.4   Continue lifestyle changes. HTN - lisinopril was discontinue at last visit. BP has been elevated over the past 2 readings. Would recommend restarting ACE. If patient declines - will have him return in 1 month to reassess and resume medication at that time. Labs are up to date. S/P Laparoscopic banding - stable   Continue bariatric follow-up. Remainder of medical problems as per resident note.     Charmayne Goodness, MD  12/20/2021 2:41 PM

## 2021-12-20 NOTE — PROGRESS NOTES
abdominal pain, hematochezia or melena  Genitourinary: no urinary urgency, frequency, dysuria, nocturia, hesitancy, or incontinence  Musculoskeletal: no arthritis, arthralgia, myalgia, weakness, or morning stiffness  Skin: no abnormal pigmentation, rash, itching, masses, hair or nail changes    Current Outpatient Medications on File Prior to Visit   Medication Sig Dispense Refill    omeprazole (PRILOSEC) 20 MG delayed release capsule Take 1 capsule by mouth 2 times daily (before meals) 60 capsule 3    Multiple Vitamin (MVI, BARIATRIC ADVANTAGE MULTI-FORMULA, CHEW TAB) Take 1 tablet by mouth daily      Calcium Carbonate (CALCI-CHEW PO) Take 1 tablet by mouth 3 times daily      Ferrous Sulfate (IRON PO) Take 1 tablet by mouth daily      Cholecalciferol (VITAMIN D3) 125 MCG (5000 UT) TABS Take 1 tablet by mouth daily      Misc. Devices MISC Give one Accucheck glucometer monitor or whatever the insurance approves. 1 Device 0    LORATADINE PO Take 10 mg by mouth as needed       No current facility-administered medications on file prior to visit. OBJECTIVE:    VS: BP (!) 152/80 (Site: Right Upper Arm, Position: Sitting, Cuff Size: Medium Adult)   Pulse 78   Temp 98.1 °F (36.7 °C) (Temporal)   Resp 22   Ht 6' (1.829 m)   Wt (!) 310 lb (140.6 kg)   SpO2 98% Comment: room air  BMI 42.04 kg/m²   General appearance: Alert, Awake, Oriented times 3, no distress  Head: atraumatic, normocephalic  Neck: no JVD, trachea midline, no adenopathy  Ear: bilateral grossly equal hearing, external ear normal  Eyes: bilateral pupils are equal, round and reactive, no icterus, no pallor, EOM intact, no nystagmus  Lungs: Lungs clear to auscultation bilaterally. No rhonchi, crackles or wheezes  Heart: S1 S2  Regular rate and rhythm. No rub, murmur or gallop  Abdomen: Abdomen soft but obese, non-tender. non-distended BS normal. No masses, organomegaly, no guarding rebound or rigidity.  Well healed laparoscopic access points. Extremities: No edema, Peripheral pulses palpable 2/4  Hematology: no petechia, purpura or ecchymoses, no adenopathy  Neurology: CN grossly intact, power/reflex/bulk bilateral equal in all extremities  Psych: normal behaviour, thought process intact, sleep normal, anhedonia absent, normal energy, normal concentration, no suicidal ideation    ASSESSMENT/PLAN:  Caesar Dale was seen today for depression and other. Diagnoses and all orders for this visit:    Recurrent major depressive disorder, remission status unspecified ([facility])  -     sertraline (ZOLOFT) 50 MG tablet; Take 1 tablet by mouth nightly    Influenza vaccine needed  -     INFLUENZA, QUADV, 3 YRS AND OLDER, IM PF, PREFILL SYR OR SDV, 0.5ML (AFLURIA QUADV, PF)    WILL on CPAP    Gastroesophageal reflux disease without esophagitis  Continue omeprazole 20mg twice daily    Morbid obesity with BMI of 50.0-59.9, adult ([facility])  -     POCT glycosylated hemoglobin (Hb A1C)  - HbA1c 5.4 today        I have reviewed all pertient PMHx, PSHx, FamHx, Social Hx, medications, and allergies and updated history as appropriate. RTC:    I have reviewed my findings and recommendations with Marleny Horn and Dr. Renny Whiteside.     Morales White MD, MD PGY-3  12/20/2021 2:54 PM

## 2021-12-20 NOTE — PROGRESS NOTES
Patient has not knowingly had unprotected exposire to anyone positive for COVID-10 within the last 14 days DENIES    AND does not have the following signs or symptoms:    A. One of the followin. Fever greater than 100.0 F NEGATIVE       2. Cough NEGATIVE       3. New onset shortness of breath NEGATIVE       4. New onset difficulty breathing NEGATIVE    AND/OR    B. Two or more of the following criteria:        1. Muscle aches NEGATIVE       2. Headache NEGATIVE       3. Sore Throat NEGATIVE       4. New onset loss of smell or taste NEGATIVE       5. New onset diarrhea NEGATIVE       6. Chills NEGATIVE       7. Runny nose NEGATIVE       8. Sneezing NEGATIVE    Patient given instruction by Dr Luisa Bartlett. 5 month follow up scheduled. Printed AVS given to patient.

## 2021-12-21 PROBLEM — E11.9 CONTROLLED TYPE 2 DIABETES MELLITUS WITHOUT COMPLICATION, WITHOUT LONG-TERM CURRENT USE OF INSULIN (HCC): Status: ACTIVE | Noted: 2021-12-21

## 2021-12-22 NOTE — PROGRESS NOTES
Chief Complaint   Patient presents with    Hip Pain     Right hip pain since February. No specific injury. No treatment as of yet directed toward hip. posterior and lateral hip pain. HPI:    Patient is 35 y.o. male complaining of atraumatic insidious onset of Right hip pain since February. No specific injury. No treatment as of yet directed toward hip. posterior and lateral hip pain. States he is lost a lot of weight and noticed more pain while trying to sleep on that side. Patient denies numbness or tingling recent trauma. Patient has tried rest, ice, heat, NSAIDs, HEP without much relief. ROS:    Skin: (-) rash,(-) psoriasis,(-) eczema, (-)skin cancer. Neurologic: (-)numbness, (-)tingling, (-)headaches, (-) LOC. Cardiovascular: (-) Chest pain, (-) swelling in legs/feet, (-) SOB, (-) cramping in legs/feet with walking.     All other review of systems negative except stated above or in HPI      Past Medical History:   Diagnosis Date    Benign essential HTN     Depression     Diabetes mellitus type 2 in obese ([address]     GERD without esophagitis     Hyperlipemia     Latex allergy     Morbid obesity due to excess calories ([address]     WILL on CPAP     10    Sleep apnea     setting 9-10     Past Surgical History:   Procedure Laterality Date    CHOLECYSTECTOMY, LAPAROSCOPIC N/A 1/24/2021    CHOLECYSTECTOMY LAPAROSCOPIC performed by Julia Gonzales MD at [address] [facility] [facility] 11/16/2020    GASTRECTOMY SLEEVE LAPAROSCOPIC performed by Catherine Covarrubias MD at [address] [place] [address] 6/19/2020    EGD BIOPSY performed by Catherine Covarrubias MD at [place] [address]       Current Outpatient Medications:     sertraline (ZOLOFT) 50 MG tablet, Take 1 tablet by mouth nightly, Disp: 90 tablet, Rfl: 1    omeprazole (PRILOSEC) 20 MG delayed release capsule, Take 1 capsule by mouth 2 times daily (before meals), Disp: 60 capsule, Rfl: 3    Multiple Vitamin (MVI, BARIATRIC ADVANTAGE MULTI-FORMULA, CHEW TAB), Take 1 tablet by mouth daily, Disp: , Rfl:     Calcium Carbonate (CALCI-CHEW PO), Take 1 tablet by mouth 3 times daily, Disp: , Rfl:     Ferrous Sulfate (IRON PO), Take 1 tablet by mouth daily, Disp: , Rfl:     Cholecalciferol (VITAMIN D3) 125 MCG (5000 UT) TABS, Take 1 tablet by mouth daily, Disp: , Rfl:     Misc. Devices MISC, Give one Accucheck glucometer monitor or whatever the insurance approves. , Disp: 1 Device, Rfl: 0    LORATADINE PO, Take 10 mg by mouth as needed, Disp: , Rfl:   Allergies   Allergen Reactions    Latex Rash    Food Other (See Comments)     Walnuts - Mouth gets sores and pt becomes dry.  Seasonal      Takes Claritin      Social History     Socioeconomic History    Marital status: Single     Spouse name: Not on file    Number of children: Not on file    Years of education: Not on file    Highest education level: Not on file   Occupational History    Occupation: Disable   Tobacco Use    Smoking status: Never Smoker    Smokeless tobacco: Never Used   Vaping Use    Vaping Use: Never used   Substance and Sexual Activity    Alcohol use: Not Currently    Drug use: Never    Sexual activity: Not on file   Other Topics Concern    Not on file   Social History Narrative    Drinks 2 L iced tea daily. Social Determinants of Health     Financial Resource Strain: High Risk    Difficulty of Paying Living Expenses: Very hard   Food Insecurity: Food Insecurity Present    Worried About Running Out of Food in the Last Year: Sometimes true    Ashlie of Food in the Last Year: Sometimes true   Transportation Needs: Unmet Transportation Needs    Lack of Transportation (Medical): Yes    Lack of Transportation (Non-Medical):  No   Physical Activity:     Days of Exercise per Week: Not on file    Minutes of Exercise per Session: Not on file   Stress:     Feeling of Stress : Not on file   Social Connections:     Frequency of Communication with Friends and Family: Not on file    Frequency of Social Gatherings with Friends and Family: Not on file    Attends Mormonism Services: Not on file    Active Member of Clubs or Organizations: Not on file    Attends Club or Organization Meetings: Not on file    Marital Status: Not on file   Intimate Partner Violence:     Fear of Current or Ex-Partner: Not on file    Emotionally Abused: Not on file    Physically Abused: Not on file    Sexually Abused: Not on file   Housing Stability: [address] Unable to Pay for Housing in the Last Year: No    Number of [place] in the Last Year: 1    Unstable Housing in the Last Year: No     Family History   Problem Relation Age of Onset    Uterine Cancer Mother     Thyroid Disease Mother     Diabetes Mother     Tuberculosis Father     Heart Attack Maternal Grandfather     Heart Attack Other     Thyroid Disease Brother     No Known Problems Brother            Physical Exam:    Ht 6' (1.829 m)   Wt (!) 315 lb (142.9 kg)   BMI 42.72 kg/m²     GENERAL: alert, appears stated age, cooperative, no acute distress    HEENT: Head is normocephalic, atraumatic. PERRLA. SKIN: Clean, dry, intact. There is not any cellulitis or cutaneous lesions noted in the upper extremities    PULMONARY: breathing is regular and unlabored, no acute distress    CV: The bilateral upper and lower extremities are warm and well-perfused with brisk capillary refill. 2+ pulses UE and LE bilateral.     PSYCHIATRY: Pleasant mood, appropriate behavior, follows commands    NEURO: Sensation is intact distally with light touch with no alteration. Motor exam of the upper extremities show elbow flexion and extension, wrist flexion and extension, and finger abduction grossly intact 5/5. Upper extremity reflexes are bilaterally symmetrical and within normal limits. LYMPH: No lymphedema present distally in upper or lower extremity.      MUSCULOSKELETAL:  Examination of the hips reveal negative C sign.  Pain with internal/external rotation in the groin. Tender palpation greater trochanteric ridge. No antalgic gait however. Tender palpation SI joint as well. Exam is benign with full range of motion 0 to 40 degrees no varus valgus instability. There is no limb length discrepancy. Imaging:  EXAMINATION:   TWO XRAY VIEWS OF THE RIGHT HIP       11/9/2021 3:42 pm       COMPARISON:   CT abdomen and pelvis from January 23, 2021       HISTORY:   ORDERING SYSTEM PROVIDED HISTORY: Right hip pain       FINDINGS:   Pubic symphysis appears congruent.  Normal appearance of the right superior   and inferior pubic rami.       Right hip joint space appears preserved.  There is mild acetabular spurring. Normal contour to the femoral head.  Femoral head/neck trabeculations appear   maintained.  The imaged proximal right femur appears intact.  There appear to   be chronic stress osseous changes off the greater trochanter.           Impression   1.  No acute osseous findings seen about the right hip.  Normal alignment.       2.  Mild right hip osteoarthritis.       3.  There appear to be chronic stress osseous changes off the greater   trochanter. El Conner was seen today for hip pain. Diagnoses and all orders for this visit:    Trochanteric bursitis of right hip    Exercise counseling    Strain of right hip, initial encounter        Patient seen and examined. X-rays reviewed with patient in detail. Natural history and course discussed with patient in long discussion  Treatment options discussed with patient in detail including risks and benefits. Patient should do well with conservative management as patient would like to avoid surgery at this time. In a 15 minute assessment and discussion, patient was counseled on continued weight loss, diet, and physical activity relating to this condition.  He was educated with options in detail including nutrition, joining a health club/ weight loss program, and use of cardio equipment such as the Arc Trainer and the importance of use as well as range of motion and HEP exercises for weight loss.      Kristal pain cream    Jeimy Kaiser, DO

## 2022-01-12 ENCOUNTER — INITIAL CONSULT (OUTPATIENT)
Dept: BARIATRICS/WEIGHT MGMT | Age: 42
End: 2022-01-12
Payer: MEDICARE

## 2022-01-12 DIAGNOSIS — F50.9 COMPULSIVE EATING PATTERNS: Primary | ICD-10-CM

## 2022-01-12 PROCEDURE — 90837 PSYTX W PT 60 MINUTES: CPT | Performed by: SOCIAL WORKER

## 2022-01-12 PROCEDURE — 1036F TOBACCO NON-USER: CPT | Performed by: SOCIAL WORKER

## 2022-01-12 NOTE — PROGRESS NOTES
INDIVIDUAL SESSION: POST OP      Aleks Cavazos is a 38 y.o. Single,   male, referred by Primary Care Provider  for evaluation and treatment. Patient identify verified by Name and . Those attending session : patient      Chief Complaint   Patient presents with    Consultation     post op weight gain       Reported Current weight 320. Wt Readings from Last 3 Encounters:   21 (!) 310 lb (140.6 kg)   21 (!) 315 lb (142.9 kg)   21 (!) 311 lb (141.1 kg)         MEDICAL PROBLEMS    Medical History:  Past Medical History:   Diagnosis Date    Benign essential HTN     Depression     Diabetes mellitus type 2 in obese (HCC)     GERD without esophagitis     Hyperlipemia     Latex allergy     Morbid obesity due to excess calories (HCC)     WILL on CPAP     10    Sleep apnea     setting 9-10        Current Outpatient Medications   Medication Sig Dispense Refill    sertraline (ZOLOFT) 50 MG tablet Take 1 tablet by mouth nightly 90 tablet 1    omeprazole (PRILOSEC) 20 MG delayed release capsule Take 1 capsule by mouth 2 times daily (before meals) 60 capsule 3    Multiple Vitamin (MVI, BARIATRIC ADVANTAGE MULTI-FORMULA, CHEW TAB) Take 1 tablet by mouth daily      Calcium Carbonate (CALCI-CHEW PO) Take 1 tablet by mouth 3 times daily      Ferrous Sulfate (IRON PO) Take 1 tablet by mouth daily      Cholecalciferol (VITAMIN D3) 125 MCG (5000 UT) TABS Take 1 tablet by mouth daily      Misc. Devices MISC Give one Accucheck glucometer monitor or whatever the insurance approves. 1 Device 0    LORATADINE PO Take 10 mg by mouth as needed       No current facility-administered medications for this visit. DIAGNOSIS:   Encounter Diagnosis   Name Primary?     Compulsive eating patterns Yes          Narrative: Angela Watters stated that he is has maintained his weight over the Holidays but has developed some patterns with food and eating that have caused him to regain some of the weight he lost after having surgery. He stated  That his uncle, who has been his main support, is now seems to be \"sabotaging\" his efforts by bringing him sweets and other foods he cannot eat. He shared other possible sources of support, siblings and friends, that he will reach out to. He also shared some confusion about \"using the tool\" correctly at this stage of weight loss (almost 1.5 years out from surgery). Mental Status Exam: appearance:  appropriately dressed and appropriately groomed, behavior:  normal, attitude:  cooperative, speech:  appropriate, mood:  euthymic, affect:  congruent with mood, thought content:  no evidence of psychosis, thought process:  logical and coherent, orientation:  oriented in all spheres, memory:  recent:  good and remote:  good, insight:  fair , judgment:  fair  and cognitive:  intact    RISK ASSESSMENT    Suicide screen: denies current suicidal ideation, plan and intent    Self Injurious Behavior: denies    Homicide screen: denies current homicidal ideation, plan and intent    TREATMENT PLAN:  Goal: Increase understanding of role of emotional, behavioral, cognitive or environmental factors contributing to issues with adjustment after bariatric surgery. Learn and implement coping/craving management  skills to assist in adjustment after bariatric surgery. Interventions in session:  Explored emotional, behavioral, cognitive or environmental factors contributing to issues with adjustment after bariatric surgery. Provided Psychoeducation: coping/craving management skills. Assignments/Recommendations: Continue follow-up with SW as needed. Review and practice coping/craving management  skills  attend [address] [place] [address] support group. Follow up with present providers all scheduled appointment at [address] [place] [address].       Next steps: Schedule follow up with me for  60MIN in 8 weeks      Patient and/or family/guardian verbalizes understanding of and agreement with treatment recommendations and plan: yes    Start time: 1:15 pm         End time: 2:15 pm

## 2022-01-12 NOTE — PATIENT INSTRUCTIONS
Assignments/Recommendations: Continue follow-up with SW as needed. Review and practice coping/craving management  skils  attend [facility] support group. Follow up with present providers all scheduled appointment at [facility].

## 2022-01-27 ENCOUNTER — OFFICE VISIT (OUTPATIENT)
Dept: INTERNAL MEDICINE | Age: 42
End: 2022-01-27
Payer: MEDICARE

## 2022-01-27 VITALS
DIASTOLIC BLOOD PRESSURE: 70 MMHG | RESPIRATION RATE: 16 BRPM | HEIGHT: 72 IN | BODY MASS INDEX: 42.66 KG/M2 | TEMPERATURE: 97.7 F | HEART RATE: 80 BPM | WEIGHT: 315 LBS | SYSTOLIC BLOOD PRESSURE: 130 MMHG

## 2022-01-27 DIAGNOSIS — I10 BENIGN ESSENTIAL HTN: Primary | ICD-10-CM

## 2022-01-27 DIAGNOSIS — E11.9 CONTROLLED TYPE 2 DIABETES MELLITUS WITHOUT COMPLICATION, WITHOUT LONG-TERM CURRENT USE OF INSULIN (HCC): ICD-10-CM

## 2022-01-27 DIAGNOSIS — F33.9 RECURRENT MAJOR DEPRESSIVE DISORDER, REMISSION STATUS UNSPECIFIED (HCC): ICD-10-CM

## 2022-01-27 PROCEDURE — G8427 DOCREV CUR MEDS BY ELIG CLIN: HCPCS | Performed by: INTERNAL MEDICINE

## 2022-01-27 PROCEDURE — 99213 OFFICE O/P EST LOW 20 MIN: CPT | Performed by: INTERNAL MEDICINE

## 2022-01-27 PROCEDURE — 2022F DILAT RTA XM EVC RTNOPTHY: CPT | Performed by: INTERNAL MEDICINE

## 2022-01-27 PROCEDURE — G8482 FLU IMMUNIZE ORDER/ADMIN: HCPCS | Performed by: INTERNAL MEDICINE

## 2022-01-27 PROCEDURE — 1036F TOBACCO NON-USER: CPT | Performed by: INTERNAL MEDICINE

## 2022-01-27 PROCEDURE — 99212 OFFICE O/P EST SF 10 MIN: CPT | Performed by: INTERNAL MEDICINE

## 2022-01-27 PROCEDURE — G8417 CALC BMI ABV UP PARAM F/U: HCPCS | Performed by: INTERNAL MEDICINE

## 2022-01-27 PROCEDURE — 3046F HEMOGLOBIN A1C LEVEL >9.0%: CPT | Performed by: INTERNAL MEDICINE

## 2022-01-27 ASSESSMENT — PATIENT HEALTH QUESTIONNAIRE - PHQ9
8. MOVING OR SPEAKING SO SLOWLY THAT OTHER PEOPLE COULD HAVE NOTICED. OR THE OPPOSITE, BEING SO FIGETY OR RESTLESS THAT YOU HAVE BEEN MOVING AROUND A LOT MORE THAN USUAL: 1
9. THOUGHTS THAT YOU WOULD BE BETTER OFF DEAD, OR OF HURTING YOURSELF: 0
4. FEELING TIRED OR HAVING LITTLE ENERGY: 1
SUM OF ALL RESPONSES TO PHQ QUESTIONS 1-9: 7
SUM OF ALL RESPONSES TO PHQ QUESTIONS 1-9: 7
2. FEELING DOWN, DEPRESSED OR HOPELESS: 1
SUM OF ALL RESPONSES TO PHQ QUESTIONS 1-9: 7
5. POOR APPETITE OR OVEREATING: 1
1. LITTLE INTEREST OR PLEASURE IN DOING THINGS: 1
7. TROUBLE CONCENTRATING ON THINGS, SUCH AS READING THE NEWSPAPER OR WATCHING TELEVISION: 0
10. IF YOU CHECKED OFF ANY PROBLEMS, HOW DIFFICULT HAVE THESE PROBLEMS MADE IT FOR YOU TO DO YOUR WORK, TAKE CARE OF THINGS AT HOME, OR GET ALONG WITH OTHER PEOPLE: 1
SUM OF ALL RESPONSES TO PHQ QUESTIONS 1-9: 7
SUM OF ALL RESPONSES TO PHQ9 QUESTIONS 1 & 2: 2
6. FEELING BAD ABOUT YOURSELF - OR THAT YOU ARE A FAILURE OR HAVE LET YOURSELF OR YOUR FAMILY DOWN: 1
3. TROUBLE FALLING OR STAYING ASLEEP: 1

## 2022-01-27 NOTE — PROGRESS NOTES
Discharge instructions reviewed with patient per Dr. Deisi Al. Follow up appt scheduled and AVS given to patient.

## 2022-01-27 NOTE — PROGRESS NOTES
[facility]  Internal Medicine Residency Program  ACC Note      SUBJECTIVE:  PMH:  · DM, HbA1C 6.8 (6/19/2020) -> 5.4 on 12/2021, no microalbuminuria (6/8/2020) - off of metformin, stable  · HTN, stable off of Lisinopril 10mg   · HLD, not on statin, LDL 91,  (6/8/2020); has not tolerated multiple statin types -  · Generalized anxiety disorder, Sertraline 50mg OD  · Vitamin D deficiency, Vit D2 [id number] started from 6/24/2020 weekly, Vit D 25 at 7 (6/19/2020) -> 21 (9/14/2020); currently on D3 5000UT  · WILL, CPAP, 10 years. · GERD, Hiatal hernia,s/p EGD 6/19/2020 negative for gastritis and H.pylori  · Obesity class III s/p laparoscopic sleeve gastrectomy 11/16/2020, multi-disciplinary approach; follows with Dr. Nayely Weber every 6 months, also follows support group every 2 weeks, attends group sessions, iron, calcium, vitamin D3 (5000U) and multivitamin supplement  · Pharmacy -> can script be sent to [facility] gaps:  COVID-19 - not has had yet    CC: had concerns including Hypertension (BP check). Sagrario Hernández presented to the [facility] for a routine visit    Has been Off metformin since dec 2020 as his A1C was 5.5. Has gained some weight. Checked A1c last visit-> 5.4%     Off Lisinopril - blood pressure stable off medication; diet and exercise; DASH diet; to try to further loose weight; recheck on next visit; re-introduce lisinopril if needed in 1 month. Patient agreeable to the plan. Had to drink less liquid at a time, due to feeling of streching -> still going slow  Follows with Dr. Nayely Weber every 6 months      Review Of Systems:  General: no fevers, chills, weight loss or gain.    Ears/Nose/Throat: no hearing loss, tinnitus, vertigo, nosebleed, nasal congestion, rhinorrhea, sore throat  Respiratory: no cough, pleuritic chest pain, dyspnea, or wheezing  Cardiovascular: no chest pain, angina, dyspnea on exertion, orthopnea, PND, palpitations, or claudication  Gastrointestinal: no nausea, vomiting, heartburn, diarrhea, constipation, abdominal pain, hematochezia or melena  Genitourinary: no urinary urgency, frequency, dysuria, nocturia, hesitancy, or incontinence  Musculoskeletal: no arthritis, arthralgia, myalgia, weakness, or morning stiffness  Skin: no abnormal pigmentation, rash, itching, masses, hair or nail changes    Current Outpatient Medications on File Prior to Visit   Medication Sig Dispense Refill    sertraline (ZOLOFT) 50 MG tablet Take 1 tablet by mouth nightly 90 tablet 1    omeprazole (PRILOSEC) 20 MG delayed release capsule Take 1 capsule by mouth 2 times daily (before meals) 60 capsule 3    Multiple Vitamin (MVI, BARIATRIC ADVANTAGE MULTI-FORMULA, CHEW TAB) Take 1 tablet by mouth daily      Calcium Carbonate (CALCI-CHEW PO) Take 1 tablet by mouth 3 times daily      Ferrous Sulfate (IRON PO) Take 1 tablet by mouth daily      Cholecalciferol (VITAMIN D3) 125 MCG (5000 UT) TABS Take 1 tablet by mouth daily      LORATADINE PO Take 10 mg by mouth as needed      Misc. Devices MISC Give one Accucheck glucometer monitor or whatever the insurance approves. 1 Device 0     No current facility-administered medications on file prior to visit. OBJECTIVE:    VS: /78   Pulse 80   Temp 97.7 °F (36.5 °C) (Oral)   Resp 16   Ht 6' (1.829 m)   Wt (!) 322 lb (146.1 kg)   BMI 43.67 kg/m²   General appearance: Alert, Awake, Oriented times 3, no distress  Head: atraumatic, normocephalic  Neck: no JVD, trachea midline, no adenopathy  Ear: bilateral grossly equal hearing, external ear normal  Eyes: bilateral pupils are equal, round and reactive, no icterus, no pallor, EOM intact, no nystagmus  Lungs: Lungs clear to auscultation bilaterally. No rhonchi, crackles or wheezes  Heart: S1 S2  Regular rate and rhythm. No rub, murmur or gallop  Abdomen: Abdomen soft but obese, non-tender.  non-distended BS normal. No masses, organomegaly, no guarding rebound or rigidity. Well healed laparoscopic access points. Extremities: No edema, Peripheral pulses palpable 2/4  Hematology: no petechia, purpura or ecchymoses, no adenopathy  Neurology: CN grossly intact, power/reflex/bulk bilateral equal in all extremities  Psych: normal behaviour, thought process intact, sleep normal, anhedonia absent, normal energy, normal concentration, no suicidal ideation    ASSESSMENT/PLAN:  Sherwin Freeman was seen today for depression and other. Diagnoses and all orders for this visit:    Recurrent major depressive disorder, remission status unspecified ([facility])  -     sertraline (ZOLOFT) 50 MG tablet; Take 1 tablet by mouth nightly    Influenza vaccine needed  -     INFLUENZA, QUADV, 3 YRS AND OLDER, IM PF, PREFILL SYR OR SDV, 0.5ML (AFLURIA QUADV, PF)    WILL on CPAP    Gastroesophageal reflux disease without esophagitis  Continue omeprazole 20mg twice daily    Morbid obesity with BMI of 50.0-59.9, adult ([facility])  -     POCT glycosylated hemoglobin (Hb A1C)  - HbA1c 5.4 today        I have reviewed all pertient PMHx, PSHx, FamHx, Social Hx, medications, and allergies and updated history as appropriate. RTC:    I have reviewed my findings and recommendations with Anaya Carbone and Dr. Juni Lopez.     Nicolle Webster MD, MD PGY-3  1/27/2022 1:21 PM

## 2022-01-27 NOTE — PATIENT INSTRUCTIONS
· Continue to take the prescribed medicines. · No need for anti-hypertensive medication or anti-diabetic medication at this point. · We will continue to monitor your blood pressure and A1C.   · Continue to follow your diet and continue to try to loose more weight  · Script for blood pressure cuff can be taken to:  [facility] supply  [address] Karolina Valdez  [phone]

## 2022-01-27 NOTE — PROGRESS NOTES
[address] [facility]  Internal Medicine Residency Clinic    Attending Physician Statement  I have discussed the case, including pertinent history and exam findings with the resident physician. I agree with the assessment, plan and orders as documented by the resident. I have reviewed all pertinent PMHx, PSHx, FamHx, SocialHx, medications, and allergies and updated history as appropriate. Patient here for BP check. Has been off lisinopril and BP remains controlled. Will continue to be off lisinopril. Keep appointment in May. Remainder of medical problems as per resident note. Mandi Angelo MD  1/27/2022 1:42 PM

## 2022-04-28 ENCOUNTER — INITIAL CONSULT (OUTPATIENT)
Dept: BARIATRICS/WEIGHT MGMT | Age: 42
End: 2022-04-28
Payer: MEDICARE

## 2022-04-28 DIAGNOSIS — F50.9 COMPULSIVE EATING PATTERNS: Primary | ICD-10-CM

## 2022-04-28 PROCEDURE — 90834 PSYTX W PT 45 MINUTES: CPT | Performed by: SOCIAL WORKER

## 2022-04-28 PROCEDURE — 1036F TOBACCO NON-USER: CPT | Performed by: SOCIAL WORKER

## 2022-04-28 NOTE — PROGRESS NOTES
INDIVIDUAL SESSION:     Jean Marie Mueller is a 38 y.o. Single,   male, referred by Primary Care Provider  for evaluation and treatment. Patient identify verified by Name and . Those attending session : patient      No chief complaint on file. DX:   Encounter Diagnosis   Name Primary?  Compulsive eating patterns Yes        Wt Readings from Last 3 Encounters:   22 (!) 322 lb (146.1 kg)   21 (!) 310 lb (140.6 kg)   21 (!) 315 lb (142.9 kg)            Narrative: Celeste Boyd stated that he is having trouble maintaining a healthy eating patterns. He shared that he sometimes grazes and seems to have more trouble with eating later in the day. He stated that completing the \"[employer] Journal\" reminded him of being on a diet which was stressful for him. He did identify  that he has some trouble regulating how fast he eats which cause him to become over-full and uncomfortable. 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 Mental Status Exam: appearance:  appropriately dressed and healthy looking, behavior:  normal, attitude:  cooperative, speech:  appropriate, mood:  euthymic, affect:  congruent with mood, thought content:  no evidence of psychosis, thought process:  logical and coherent, orientation:  oriented in all spheres, memory:  recent:  good and remote:  good, insight:  fair , judgment:  fair  and cognitive:  intact and intelligent    RISK ASSESSMENT    Suicide screen: denies current suicidal ideation, plan and intent    Self Injurious Behavior: denies    Homicide screen: denies current homicidal ideation, plan and intent    TREATMENT PLAN:  Goal: Increase understanding of role of emotional, behavioral, cognitive or environmental factors contributing to weigpost op weight gain.  Learn and implement coping/craving management skills to assist in adjustment after bariatric surgery. Interventions in session:  Explored emotional, behavioral, cognitive or environmental factors contributing to issues with post op weight gain Provided Psychoeducation: coping/craving management skills. Assignments/Recommendations: Continue follow-up with SW as needed. Review and practice coping/craving management skills, attend [facility] support group. Follow up with present providers and all scheduled appointment at [facility].       Next steps: Schedule follow up with me for  60MIN in 6 weeks      Patient and/or family/guardian verbalizes understanding of and agreement with treatment recommendations and plan: yes    Start time: 2:15 pm         End time: 3:04     Visit Time: 49 min    Vasiliy Francois

## 2022-06-01 ENCOUNTER — OFFICE VISIT (OUTPATIENT)
Dept: BARIATRICS/WEIGHT MGMT | Age: 42
End: 2022-06-01
Payer: MEDICARE

## 2022-06-01 VITALS
DIASTOLIC BLOOD PRESSURE: 88 MMHG | HEIGHT: 72 IN | BODY MASS INDEX: 42.66 KG/M2 | SYSTOLIC BLOOD PRESSURE: 151 MMHG | HEART RATE: 80 BPM | TEMPERATURE: 97.7 F | RESPIRATION RATE: 20 BRPM | WEIGHT: 315 LBS

## 2022-06-01 DIAGNOSIS — K91.2 MALNUTRITION FOLLOWING GASTROINTESTINAL SURGERY: Primary | ICD-10-CM

## 2022-06-01 PROCEDURE — G8427 DOCREV CUR MEDS BY ELIG CLIN: HCPCS | Performed by: SURGERY

## 2022-06-01 PROCEDURE — 99211 OFF/OP EST MAY X REQ PHY/QHP: CPT

## 2022-06-01 PROCEDURE — G8417 CALC BMI ABV UP PARAM F/U: HCPCS | Performed by: SURGERY

## 2022-06-01 PROCEDURE — 99213 OFFICE O/P EST LOW 20 MIN: CPT | Performed by: SURGERY

## 2022-06-01 PROCEDURE — 1036F TOBACCO NON-USER: CPT | Performed by: SURGERY

## 2022-06-01 NOTE — PROGRESS NOTES
Patient is 18 mo LRYGB. Water intake is around 128 oz daily. Protein through shakes and foods. Vitamin intake is good. Bowel movements are good.

## 2022-06-01 NOTE — PATIENT INSTRUCTIONS
Please continue to take your vitamin and mineral supplements as instructed. If you received a blood work prescription today for laboratory monitoring due prior to your next routine follow-up visit, please have this blood work obtained 10 to 14 days prior to your next visit. It is important to fast for 12 hours prior to routine weight loss surgery blood work, EXCEPT for drinking water, to ensure accuracy of results. Please report nausea, vomiting, abdominal pain, or any other problems you experience to your surgeon. For problems related to weight loss surgery, it is best to go to [address] [place] [address] Emergency Department and have your surgeon paged.

## 2022-06-16 ENCOUNTER — TELEMEDICINE (OUTPATIENT)
Dept: BARIATRICS/WEIGHT MGMT | Age: 42
End: 2022-06-16
Payer: MEDICARE

## 2022-06-16 DIAGNOSIS — F50.9 COMPULSIVE EATING PATTERNS: Primary | ICD-10-CM

## 2022-06-16 PROCEDURE — 1036F TOBACCO NON-USER: CPT | Performed by: SOCIAL WORKER

## 2022-06-16 PROCEDURE — 90837 PSYTX W PT 60 MINUTES: CPT | Performed by: SOCIAL WORKER

## 2022-06-16 NOTE — PROGRESS NOTES
INDIVIDUAL SESSION:     Carolyn Chi is a 38 y.o. Single,   male, referred by Primary Care Provider  for evaluation and treatment. Patient identify verified by Name and . This session was provided as a live video telehealth contact using \"My Chart/Haiku/Keiry\" . Pt was informed and understands the following: that this live video telehealth contact is being made in lieu of a face to face meeting due to the COVID-19 pandemic; this contact is considered a telehealth services; the same session fees that apply to face to face services apply to telehealth services; the benefits and risks of engaging in telehealth services; the need for reliable and secure Internet/phone connection; and that this is their responsibility to maintain the privacy and security of their device and location. With this information, the client verbally consents to participate in a live video telehealth session. Patient Consent :   SW discussed role and services including limits to confidentiality, documentation in a single EMR with care team, time-limited services, and potential financial responsibility. Patient expressed understanding and is agreeable to same. PT consented to receiving unencrypted e-mail   yes      Patient's location: home address in [address] [place] [address]  location other address in [facility]       Those attending session : patient      Chief Complaint   Patient presents with    Consultation     post op follow up       DX:   Encounter Diagnosis   Name Primary?  Compulsive eating patterns Yes        Wt Readings from Last 3 Encounters:   22 (!) 324 lb (147 kg)   22 (!) 322 lb (146.1 kg)   21 (!) 310 lb (140.6 kg)            Narrative: Akila Daniela shared that he has been having some problems with his desire to move forward in his life and what steps to take to start that process.   He stated that he has not lost any weight but has not gained any either and is working toward starting weigh loss again as he has not met his goal weight. He shared that he is considering seeing a therapist and plans to speak to his PCP this week at their next scheduled appointment. Steph Jackson stated that he would consider revision as discussed with his surgeon but that there may be other alteratives s well. Steph Jackson also shared his concerns about how focused he becomes when he is working on a project. He described this as being \"obsessed\" with the project until it is completed. He stated that he was open to learning some DBT skills. He also stated that he will follow through with PCP regarding a therapist.             Mental Status Exam: appearance:  appropriately dressed and appropriately groomed, behavior:  normal, attitude:  cooperative, speech:  appropriate, mood:  euthymic, affect:  congruent with mood, thought content:  no evidence of psychosis, thought process:  logical and coherent, orientation:  oriented in all spheres, memory:  recent:  good and remote:  good, insight:  fair , judgment:  fair  and cognitive:  intact and intelligent    RISK ASSESSMENT    Suicide screen: denies current suicidal ideation, plan and intent    Self Injurious Behavior: denies    Homicide screen: denies current homicidal ideation, plan and intent    TREATMENT PLAN:  Goal: Increase understanding of role of emotional, behavioral, cognitive or environmental factors contributing to issues with adjustment after bariatric surgery. Learn and implement coping/craving management  skills to assist in adjustment after bariatric surgery. Interventions in session:  Explored emotional, behavioral, cognitive or environmental factors contributing to issues with adjustment after bariatric surgery. Provided Psychoeducation: coping/craving management skills . Assignments/Recommendations: Continue follow-up with SW as needed. Review and practice coping/craving management  skils Mindfulness and Radical Acceptance, attend [facility] support group.  Follow up with (referrals/present providers) all scheduled appointment at [facility].       Next steps: Schedule follow up with me for  60MIN in 10 weeks      Patient and/or family/guardian verbalizes understanding of and agreement with treatment recommendations and plan: yes    Start time: 2:28 pm         End time: 3:38 pm    Visit Time: [address], LISW

## 2022-06-16 NOTE — PATIENT INSTRUCTIONS
Assignments/Recommendations: Continue follow-up with SW as needed. Review and practice coping/craving management  skils Mindfulness and Radical Acceptance, attend [facility] support group. Follow up with (referrals/present providers) all scheduled appointment at [facility].

## 2022-06-22 NOTE — PROGRESS NOTES
[facility]  Internal Medicine Residency Program  ACC Note      SUBJECTIVE:  PMH:  · DM, HbA1C 6.8 (6/19/2020) -> 5.4 on 12/2021, no microalbuminuria (6/8/2020) - off of metformin, stable  · HTN, stable off of Lisinopril 10mg   · HLD, not on statin, LDL 91,  (6/8/2020); has not tolerated multiple statin types  · Generalized anxiety disorder, Sertraline 50mg OD; to establish with psychiatraist  · Vitamin D deficiency, Vit D2 54770AP started from 6/24/2020 weekly, Vit D 25 at 7 (6/19/2020) -> 21 (9/14/2020); currently on D3 5000UT  · WILL, CPAP, 10 years. · GERD, Hiatal hernia,s/p EGD 6/19/2020 negative for gastritis and H.pylori, weaning off omeprazole  · Obesity class III s/p laparoscopic sleeve gastrectomy 11/16/2020, multi-disciplinary approach; follows with Dr. Nona Lacey every 6 months, also follows support group every 2 weeks, attends group sessions, iron, calcium, vitamin D3 (5000U) and multivitamin supplement     Care gaps:  COVID-19 vaccine - not has had yet - advised to go to local pharmacy    CC: had concerns including Medication Refill. Guillermina Rivero presented to the [facility] for a routine visit      ADHD?   Working memory (-)  Task shifting (++)  Self monitoring (-)  Initiation difficulty (+) / stopping difficulty (+)  Self inhibition (-)  Impulsivity (-)  Hyperactivity / restlessness: racing thoughts (+)  Emotional dysregulation (+)    -no signs of delvin  -will discuss with LISW to establish with psych  -consider switching to [address] [place], [place] [address]Th [facility] for help with weight loss        Obesity class III s/p laparoscopic sleeve gastrectomy 11/16/2020, multi-disciplinary approach; follows with Dr. Nona Lacey every 6 months, also follows support group every 2 weeks, attends group sessions, iron, calcium, vitamin D3 (5000U) and multivitamin supplement  - discussed about further plans with Dr. Forrest Arellano team      GERD  - currently taking omeprazole 20 mg every other day  - continue to wean off    Has been Off metformin since dec 2020 as his A1C was 5.5. Has gained some weight. Checked A1c last visit-> 5.4%   - hemoglobin a1c on next visit     Off Lisinopril - blood pressure stable off medication; diet and exercise; DASH diet; to try to further loose weight; recheck on next visit  - continue off medication         Review Of Systems:  General: no fevers, chills, weight loss or gain. Ears/Nose/Throat: no hearing loss, tinnitus, vertigo, nosebleed, nasal congestion, rhinorrhea, sore throat  Respiratory: no cough, pleuritic chest pain, dyspnea, or wheezing  Cardiovascular: no chest pain, angina, dyspnea on exertion, orthopnea, PND, palpitations, or claudication  Gastrointestinal: no nausea, vomiting, heartburn, diarrhea, constipation, abdominal pain, hematochezia or melena  Genitourinary: no urinary urgency, frequency, dysuria, nocturia, hesitancy, or incontinence  Musculoskeletal: no arthritis, arthralgia, myalgia, weakness, or morning stiffness  Skin: no abnormal pigmentation, rash, itching, masses, hair or nail changes    Current Outpatient Medications on File Prior to Visit   Medication Sig Dispense Refill    Multiple Vitamin (MVI, BARIATRIC ADVANTAGE MULTI-FORMULA, CHEW TAB) Take 1 tablet by mouth daily      Calcium Carbonate (CALCI-CHEW PO) Take 1 tablet by mouth 3 times daily      Ferrous Sulfate (IRON PO) Take 1 tablet by mouth daily      Cholecalciferol (VITAMIN D3) 125 MCG (5000 UT) TABS Take 1 tablet by mouth daily      LORATADINE PO Take 10 mg by mouth as needed      Misc. Devices KIT Dispense 1 blood pressure cuff. 1 kit 0    Misc. Devices MISC Give one Accucheck glucometer monitor or whatever the insurance approves. 1 Device 0     No current facility-administered medications on file prior to visit.        OBJECTIVE:    VS: /81 (Site: Right Upper Arm, Position: Sitting, Cuff Size: Large Adult)   Pulse 56   Temp 98.3 °F (36.8 °C) (Oral)   Resp 18   Ht 6' (1.829 m)   Wt (!) 326 lb 11.2 oz (148.2 kg)   SpO2 100%   BMI 44.31 kg/m²   General appearance: Alert, Awake, Oriented times 3, no distress  Head: atraumatic, normocephalic  Neck: no JVD, trachea midline, no adenopathy  Ear: bilateral grossly equal hearing, external ear normal  Eyes: bilateral pupils are equal, round and reactive, no icterus, no pallor, EOM intact, no nystagmus  Lungs: Lungs clear to auscultation bilaterally. No rhonchi, crackles or wheezes  Heart: S1 S2  Regular rate and rhythm. No rub, murmur or gallop  Abdomen: Abdomen soft but obese, non-tender. non-distended BS normal. No masses, organomegaly, no guarding rebound or rigidity. Extremities: No edema, Peripheral pulses palpable 2/4  Hematology: no petechia, purpura or ecchymoses, no adenopathy  Neurology: CN grossly intact, power/reflex/bulk bilateral equal in all extremities  Psych: normal behaviour, thought process intact, sleep normal, anhedonia absent, normal energy, normal concentration, no suicidal ideation    ASSESSMENT/PLAN:  Armand Jean Baptiste was seen today for medication refill. Diagnoses and all orders for this visit:    Gastroesophageal reflux disease without esophagitis  -     omeprazole (PRILOSEC) 20 MG delayed release capsule; Take 1 capsule by mouth every other day    Recurrent major depressive disorder, remission status unspecified (HCC)  -     sertraline (ZOLOFT) 50 MG tablet; Take 1 tablet by mouth nightly        I have reviewed all pertient PMHx, PSHx, FamHx, Social Hx, medications, and allergies and updated history as appropriate.     RTC:    I have reviewed my findings and recommendations with Morgan Titus and Dr. Mita Richmond MD, MD PGY-3   6/23/2022 9:24 AM

## 2022-06-23 ENCOUNTER — OFFICE VISIT (OUTPATIENT)
Dept: INTERNAL MEDICINE | Age: 42
End: 2022-06-23
Payer: MEDICARE

## 2022-06-23 ENCOUNTER — SOCIAL WORK (OUTPATIENT)
Dept: INTERNAL MEDICINE | Age: 42
End: 2022-06-23

## 2022-06-23 VITALS
SYSTOLIC BLOOD PRESSURE: 125 MMHG | BODY MASS INDEX: 42.66 KG/M2 | OXYGEN SATURATION: 100 % | HEART RATE: 56 BPM | TEMPERATURE: 98.3 F | HEIGHT: 72 IN | WEIGHT: 315 LBS | DIASTOLIC BLOOD PRESSURE: 81 MMHG | RESPIRATION RATE: 18 BRPM

## 2022-06-23 DIAGNOSIS — F33.9 RECURRENT MAJOR DEPRESSIVE DISORDER, REMISSION STATUS UNSPECIFIED (HCC): Primary | ICD-10-CM

## 2022-06-23 DIAGNOSIS — K21.9 GASTROESOPHAGEAL REFLUX DISEASE WITHOUT ESOPHAGITIS: ICD-10-CM

## 2022-06-23 PROCEDURE — 99213 OFFICE O/P EST LOW 20 MIN: CPT | Performed by: INTERNAL MEDICINE

## 2022-06-23 PROCEDURE — G8417 CALC BMI ABV UP PARAM F/U: HCPCS | Performed by: INTERNAL MEDICINE

## 2022-06-23 PROCEDURE — 1036F TOBACCO NON-USER: CPT | Performed by: INTERNAL MEDICINE

## 2022-06-23 PROCEDURE — G8427 DOCREV CUR MEDS BY ELIG CLIN: HCPCS | Performed by: INTERNAL MEDICINE

## 2022-06-23 RX ORDER — OMEPRAZOLE 20 MG/1
20 CAPSULE, DELAYED RELEASE ORAL EVERY OTHER DAY
Qty: 45 CAPSULE | Refills: 1 | Status: SHIPPED | OUTPATIENT
Start: 2022-06-23

## 2022-06-23 NOTE — PROGRESS NOTES
Patient verbalized understanding of office instructions. He will call with questions or concerns. Pt was given discharge instructions, all questions were fully answered.   Instructed to stop at the  for AVS

## 2022-06-23 NOTE — PROGRESS NOTES
SW met with pt, pt requested referral to psychiatry pt reported depression is controlled but he would like more additional counseling and an assessment for long-term treatment. SW provided referral information for [facility].

## 2022-06-23 NOTE — PATIENT INSTRUCTIONS
· To establish with psychiatrist for accurate diagnosis and medication adjustment as needed. · To continue current regimen for now  · Please continue to wean omeprazole further. Try OTC tums as needed.   · Follow up in 6 months

## 2022-06-23 NOTE — PROGRESS NOTES
[facility]  Internal Medicine Residency Clinic    Attending Physician Statement  I have discussed the case, including pertinent history and exam findings with the resident physician. I agree with the assessment, plan and orders as documented by the resident. I have reviewed all pertinent PMHx, PSHx, FamHx, SocialHx, medications, and allergies and updated history as appropriate. Patient here for routine follow up of medical problems. 1. S/p sleeve gastrectomy. Continue follow up with Dr Desiree Michelle. Continue supplementations. 2. Hx of DM, off medications now  3. Hx of HTN, off medications now  4. MDD, on sertraline. Patient is concerned about his brother who was recently diagnosed with bipolar d/o. He has symptoms like impulsivity but no dysfunction in multiple settings. Referral to [facility] for counseling/possible psych referral for continued medication management. Remainder of medical problems as per resident note. Gemma Pastor MD  6/23/2022 9:17 AM

## 2022-08-18 ENCOUNTER — TELEMEDICINE (OUTPATIENT)
Dept: BARIATRICS/WEIGHT MGMT | Age: 42
End: 2022-08-18
Payer: MEDICARE

## 2022-08-18 DIAGNOSIS — F50.9 COMPULSIVE EATING PATTERNS: Primary | ICD-10-CM

## 2022-08-18 PROCEDURE — 90837 PSYTX W PT 60 MINUTES: CPT | Performed by: SOCIAL WORKER

## 2022-08-18 NOTE — PATIENT INSTRUCTIONS
Assignments/Recommendations: Continue follow-up with SW as needed. Review and practice coping/craving management  skills, attend [facility] support group. Follow up with (referrals/present providers) all scheduled appointment at [facility].

## 2022-08-18 NOTE — PROGRESS NOTES
INDIVIDUAL SESSION:     Kitty Barraza is a 38 y.o. Single,   male, referred by Primary Care Provider  for evaluation and treatment. Patient identify verified by Name and . This session was provided as a live video telehealth contact using \"My Chart/Haiku/Keiry\" due to  COVID 19 restriction on face to face visits. Pt was informed and understands the following: that this live video telehealth contact is being made in lieu of a face to face meeting due to the COVID-19 pandemic; this contact is considered a telehealth services; the same session fees that apply to face to face services apply to telehealth services; the benefits and risks of engaging in telehealth services; the need for reliable and secure Internet/phone connection; and that this is their responsibility to maintain the privacy and security of their device and location. With this information, the client verbally consents to participate in a live video telehealth session. Patient Consent :   SW discussed role and services including limits to confidentiality, documentation in a single EMR with care team, time-limited services, and potential financial responsibility. Patient expressed understanding and is agreeable to same. PT consented to receiving unencrypted e-mail   yes      Patient's location: home address in [address] [place] [address]  location other address in [facility]       Those attending session : patient      Chief Complaint   Patient presents with    Consultation     Post op follow up       DX:   Encounter Diagnosis   Name Primary? Compulsive eating patterns Yes        Wt Readings from Last 3 Encounters:   22 (!) 326 lb 11.2 oz (148.2 kg)   22 (!) 324 lb (147 kg)   22 (!) 322 lb (146.1 kg)          Narrative: Alyssa Yeager shared that he has started seeing a therapist recently and has also started a new medication (Adderal) which he reported has been helpful.   Maurizio shared that medication is helping with racing thoughts and distractibility. He shared that he is staying busy and his mood has been good and that he feels as though he is making progress with emotional patterns with food. He also shared that he is having less anxiety. He stated that he is hopeful that medication and therapy will help him work through childhood issues and support his efforts at maintaining a healthy lifestyle. Mental Status Exam: appearance:  appropriately dressed and appropriately groomed, behavior:  normal, attitude:  cooperative, speech:  appropriate, mood:  euthymic, affect:  congruent with mood, thought content:  no evidence of psychosis, thought process:  logical and coherent, orientation:  oriented in all spheres, memory:  recent:  good and remote:  good, insight:  fair , judgment:  fair , and cognitive:  intact and intelligent    RISK ASSESSMENT    Suicide screen: denies current suicidal ideation, plan and intent    Self Injurious Behavior: denies    Homicide screen: denies current homicidal ideation, plan and intent    TREATMENT PLAN:  Goal: Increase understanding of role of emotional, behavioral, cognitive or environmental factors contributing to issues with adjustment after bariatric surgery. Learn and implement coping/craving management  skills to assist in adjustment after bariatric surgery. Interventions in session:  Explored emotional, behavioral, cognitive or environmental factors contributing to issues with adjustment after bariatric surgery. Provided Psychoeducation: coping/craving management skills       Assignments/Recommendations: Continue follow-up with SW as needed. Review and practice coping/craving management  skills, attend [facility] support group. Follow up with (referrals/present providers) all scheduled appointment at [facility].       Next steps: Schedule follow up with me for  60MIN in 9 weeks      Patient and/or family/guardian verbalizes understanding of and agreement with treatment recommendations and plan: yes    Start time: 1:22 pm         End time: 2:18 PM     Visit Time:  TAM Bonilla

## 2022-11-07 ENCOUNTER — HOSPITAL ENCOUNTER (OUTPATIENT)
Age: 42
Discharge: HOME | End: 2022-11-07
Payer: MEDICARE

## 2022-11-07 DIAGNOSIS — K91.2 MALNUTRITION FOLLOWING GASTROINTESTINAL SURGERY: ICD-10-CM

## 2022-11-07 LAB
ALBUMIN SERPL-MCNC: 3.9 G/DL (ref 3.5–5.2)
ALP BLD-CCNC: 100 U/L (ref 40–129)
ALT SERPL-CCNC: 22 U/L (ref 0–40)
ANION GAP SERPL CALCULATED.3IONS-SCNC: 7 MMOL/L (ref 7–16)
AST SERPL-CCNC: 18 U/L (ref 0–39)
BILIRUB SERPL-MCNC: 0.5 MG/DL (ref 0–1.2)
BUN BLDV-MCNC: 11 MG/DL (ref 6–20)
CALCIUM SERPL-MCNC: 9.6 MG/DL (ref 8.6–10.2)
CHLORIDE BLD-SCNC: 104 MMOL/L (ref 98–107)
CO2: 29 MMOL/L (ref 22–29)
CREAT SERPL-MCNC: 0.9 MG/DL (ref 0.7–1.2)
GFR SERPL CREATININE-BSD FRML MDRD: >60 ML/MIN/1.73
GLUCOSE BLD-MCNC: 103 MG/DL (ref 74–99)
HCT VFR BLD CALC: 41.7 % (ref 37–54)
HEMOGLOBIN: 14.3 G/DL (ref 12.5–16.5)
MCH RBC QN AUTO: 26 PG (ref 26–35)
MCHC RBC AUTO-ENTMCNC: 34.3 % (ref 32–34.5)
MCV RBC AUTO: 75.7 FL (ref 80–99.9)
PDW BLD-RTO: 14.3 FL (ref 11.5–15)
PLATELET # BLD: 228 E9/L (ref 130–450)
PMV BLD AUTO: 10.1 FL (ref 7–12)
POTASSIUM SERPL-SCNC: 3.9 MMOL/L (ref 3.5–5)
RBC # BLD: 5.51 E12/L (ref 3.8–5.8)
SODIUM BLD-SCNC: 140 MMOL/L (ref 132–146)
TOTAL PROTEIN: 7.5 G/DL (ref 6.4–8.3)
WBC # BLD: 8.5 E9/L (ref 4.5–11.5)

## 2022-11-07 PROCEDURE — 82746 ASSAY OF FOLIC ACID SERUM: CPT

## 2022-11-07 PROCEDURE — 84630 ASSAY OF ZINC: CPT

## 2022-11-07 PROCEDURE — 36415 COLL VENOUS BLD VENIPUNCTURE: CPT

## 2022-11-07 PROCEDURE — 82728 ASSAY OF FERRITIN: CPT

## 2022-11-07 PROCEDURE — 84425 ASSAY OF VITAMIN B-1: CPT

## 2022-11-07 PROCEDURE — 82525 ASSAY OF COPPER: CPT

## 2022-11-07 PROCEDURE — 82465 ASSAY BLD/SERUM CHOLESTEROL: CPT

## 2022-11-07 PROCEDURE — 80053 COMPREHEN METABOLIC PANEL: CPT

## 2022-11-07 PROCEDURE — 84478 ASSAY OF TRIGLYCERIDES: CPT

## 2022-11-07 PROCEDURE — 82607 VITAMIN B-12: CPT

## 2022-11-07 PROCEDURE — 84134 ASSAY OF PREALBUMIN: CPT

## 2022-11-07 PROCEDURE — 85027 COMPLETE CBC AUTOMATED: CPT

## 2022-11-07 PROCEDURE — 84255 ASSAY OF SELENIUM: CPT

## 2022-11-07 PROCEDURE — 82306 VITAMIN D 25 HYDROXY: CPT

## 2022-11-08 LAB
CHOLESTEROL, TOTAL: 162 MG/DL (ref 0–199)
FERRITIN: 297 NG/ML
FOLATE: 13.9 NG/ML (ref 4.8–24.2)
PREALBUMIN: 15 MG/DL (ref 20–40)
TRIGL SERPL-MCNC: 131 MG/DL (ref 0–149)
VITAMIN B-12: 602 PG/ML (ref 211–946)
VITAMIN D 25-HYDROXY: 37 NG/ML (ref 30–100)

## 2022-11-10 LAB
COPPER: 113.1 UG/DL (ref 70–140)
SELENIUM: 116.4 UG/L (ref 23–190)
ZINC: 68.4 UG/DL (ref 60–120)

## 2022-11-13 LAB — VITAMIN B1 WHOLE BLOOD: 141 NMOL/L (ref 70–180)

## 2022-11-16 ENCOUNTER — OFFICE VISIT (OUTPATIENT)
Dept: BARIATRICS/WEIGHT MGMT | Age: 42
End: 2022-11-16
Payer: MEDICARE

## 2022-11-16 VITALS
SYSTOLIC BLOOD PRESSURE: 175 MMHG | HEIGHT: 72 IN | DIASTOLIC BLOOD PRESSURE: 81 MMHG | TEMPERATURE: 97.7 F | BODY MASS INDEX: 42.39 KG/M2 | WEIGHT: 313 LBS | HEART RATE: 121 BPM | RESPIRATION RATE: 20 BRPM

## 2022-11-16 DIAGNOSIS — F50.9 COMPULSIVE EATING PATTERNS: Primary | ICD-10-CM

## 2022-11-16 DIAGNOSIS — K91.2 MALNUTRITION FOLLOWING GASTROINTESTINAL SURGERY: Primary | ICD-10-CM

## 2022-11-16 PROCEDURE — 1036F TOBACCO NON-USER: CPT | Performed by: SOCIAL WORKER

## 2022-11-16 PROCEDURE — G8417 CALC BMI ABV UP PARAM F/U: HCPCS | Performed by: SURGERY

## 2022-11-16 PROCEDURE — 99213 OFFICE O/P EST LOW 20 MIN: CPT | Performed by: SURGERY

## 2022-11-16 PROCEDURE — 90834 PSYTX W PT 45 MINUTES: CPT | Performed by: SOCIAL WORKER

## 2022-11-16 PROCEDURE — 99211 OFF/OP EST MAY X REQ PHY/QHP: CPT

## 2022-11-16 PROCEDURE — G8484 FLU IMMUNIZE NO ADMIN: HCPCS | Performed by: SURGERY

## 2022-11-16 PROCEDURE — 1036F TOBACCO NON-USER: CPT | Performed by: SURGERY

## 2022-11-16 PROCEDURE — 3078F DIAST BP <80 MM HG: CPT | Performed by: SURGERY

## 2022-11-16 PROCEDURE — G8427 DOCREV CUR MEDS BY ELIG CLIN: HCPCS | Performed by: SURGERY

## 2022-11-16 PROCEDURE — 3074F SYST BP LT 130 MM HG: CPT | Performed by: SURGERY

## 2022-11-16 NOTE — PROGRESS NOTES
Saul Fleming  11/16/2022  Dick Garces   Laparoscopic Sleeve Gastrectomy  2 Year Post-Operative Follow-up       Saul Fleming is a 38 y.o. male 2 years post Laparoscopic Sleeve Gastrectomy. He reports epigastric pain, is not having swallowing difficulty, is compliant most of the time with the multivitamins and calcium + Vit D. He is meeting fluid recommendations of at least 64 ounces per day and is meeting protein recommendations. He is not exercising: no regular exercise. Weight=(!) 313 lb (142 kg)  Today's weight represents a total weight loss of 49 pounds since surgery and regained 13 pounds since the last visit. Prior to Admission medications    Medication Sig Start Date End Date Taking? Authorizing Provider   omeprazole (PRILOSEC) 20 MG delayed release capsule Take 1 capsule by mouth every other day 6/23/22  Yes Pro Vines MD   sertraline (ZOLOFT) 50 MG tablet Take 1 tablet by mouth nightly 6/23/22  Yes Pro Vines MD   Misc. Devices KIT Dispense 1 blood pressure cuff. 1/27/22  Yes Pro Vines MD   Multiple Vitamin (MVI, BARIATRIC ADVANTAGE MULTI-FORMULA, CHEW TAB) Take 1 tablet by mouth daily   Yes Historical Provider, MD   Calcium Carbonate (CALCI-CHEW PO) Take 1 tablet by mouth 3 times daily   Yes Historical Provider, MD   Ferrous Sulfate (IRON PO) Take 1 tablet by mouth daily   Yes Historical Provider, MD   Cholecalciferol (VITAMIN D3) 125 MCG (5000 UT) TABS Take 1 tablet by mouth daily   Yes Historical Provider, MD   Misc. Devices MISC Give one Accucheck glucometer monitor or whatever the insurance approves.  9/1/17  Yes Wiliam Wang MD   LORATADINE PO Take 10 mg by mouth as needed   Yes Historical Provider, MD          Physical exam:   BP (!) 175/81 (Site: Left Lower Arm, Position: Sitting, Cuff Size: Medium Adult)   Pulse (!) 121   Temp 97.7 °F (36.5 °C) (Temporal)   Resp 20   Ht 6' (1.829 m)   Wt (!) 313 lb (142 kg)   BMI 42.45 kg/m²   General appearance: alert, appears stated age and cooperative  Head: Normocephalic, without obvious abnormality, atraumatic  Neck: no adenopathy, no carotid bruit, no JVD, supple, symmetrical, trachea midline and thyroid not enlarged, symmetric, no tenderness/mass/nodules  Lungs: clear to auscultation bilaterally  Heart: regular rate and rhythm  Abdomen: soft, non-tender; bowel sounds normal; no masses,  no organomegaly, no hernias  Extremities: extremities normal, atraumatic, no cyanosis or edema    Assessment: epigastric pain when not taking prilosec post Laparoscopic Sleeve Gastrectomy. He does not complain of GERD,  does not have sleep apnea,  does not have diabetes,  does have hypertension off medical treatment. Cholesterol and triglycerides are normal. Malnutrition, hypoalbuminemia, epigastric pain when not taking prilosec    Plan:  If pain gets worse will do EGD. Continue to eat a high protein, low calorie diet, eat small portions very slowly and chew well before swallowing. Drink plenty of water and fluids. Make sure to use fiber to keep the bowels regular. Try to exercise 7 days per week, maintain adequate variety and balance. Always notify the clinic if you have any medical problems. Follow up in 6 months.  Will check several lab panels      Physician Signature: Electronically signed by Dr. Mohit Avila MD

## 2022-11-16 NOTE — PATIENT INSTRUCTIONS
Assignments/Recommendations: Follow-up with SW as needed. Attend [facility] support group. Follow up with current primary therapist and all scheduled follow up appointment at [facility].

## 2022-11-16 NOTE — PATIENT INSTRUCTIONS
Please continue to take your vitamin and mineral supplements as instructed. If you received a blood work prescription today for laboratory monitoring due prior to your next routine follow-up visit, please have this blood work obtained 10 to 14 days prior to your next visit. It is important to fast for 12 hours prior to routine weight loss surgery blood work, EXCEPT for drinking water, to ensure accuracy of results. Please report nausea, vomiting, abdominal pain, or any other problems you experience to your surgeon. For problems related to weight loss surgery, it is best to go to [address] [place] [address] Emergency Department and have your surgeon paged.

## 2022-11-16 NOTE — PROGRESS NOTES
Patient is 1 yr LSG. Water intake is around a gallon daily. Protein through shakes and foods. Vitamin intake is good. Bowel movements are good.

## 2022-11-16 NOTE — PROGRESS NOTES
Assignments/Recommendations: Follow-up with SW as needed. Attend [facility] support group. Follow up with current primary therapist and all scheduled follow up appointment at [facility].       Next steps: Continue with bariatric support group      Patient and/or family/guardian verbalizes understanding of and agreement with treatment recommendations and plan: yes    Start time: 2:50 pm          End time: 3:35 PM       Visit Time: 45MIN    TAM Modi

## 2023-01-04 DIAGNOSIS — K21.9 GASTROESOPHAGEAL REFLUX DISEASE WITHOUT ESOPHAGITIS: ICD-10-CM

## 2023-01-05 RX ORDER — OMEPRAZOLE 20 MG/1
CAPSULE, DELAYED RELEASE ORAL
Qty: 30 CAPSULE | OUTPATIENT
Start: 2023-01-05

## 2023-01-09 DIAGNOSIS — K21.9 GASTROESOPHAGEAL REFLUX DISEASE WITHOUT ESOPHAGITIS: ICD-10-CM

## 2023-01-09 NOTE — TELEPHONE ENCOUNTER
I called and spoke to patient regarding refill of omeprazole , due to not being seen since 8/2022, per patient he's not able to come in until March. PCP here at beginning of April. Pt Scheduled . Please see pt 's message for reference of medication. Omeprazole queued.

## 2023-01-09 NOTE — TELEPHONE ENCOUNTER
----- Message from Ben Riley sent at 2023  4:56 PM EST -----  Regarding: Omeprazole 20mg Refill Request  Dr Alton Schmitt and team :)    I am in need of a refill of Omeprazole 20mg. Dr Radha Vieira prescribed this and previously Dr Katty Soares prescribed it after my bariatric surgery. I recently had my 2 year bariatric surgery followup with my surgeon Dr Katty Soares and he would like me to continue to ween myself off Omeprazole; which has been going well; however, as you can see by the script on file having , I use Omeprazole about every 3 days; basically as needed. Thanks in advance for your assistance.     -Isi

## 2023-01-10 RX ORDER — OMEPRAZOLE 20 MG/1
20 CAPSULE, DELAYED RELEASE ORAL EVERY OTHER DAY
Qty: 45 CAPSULE | Refills: 1 | Status: SHIPPED | OUTPATIENT
Start: 2023-01-10

## 2023-04-03 ENCOUNTER — OFFICE VISIT (OUTPATIENT)
Dept: INTERNAL MEDICINE | Age: 43
End: 2023-04-03
Payer: MEDICARE

## 2023-04-03 VITALS
HEART RATE: 88 BPM | TEMPERATURE: 97.2 F | WEIGHT: 290.4 LBS | RESPIRATION RATE: 18 BRPM | SYSTOLIC BLOOD PRESSURE: 112 MMHG | BODY MASS INDEX: 39.33 KG/M2 | HEIGHT: 72 IN | DIASTOLIC BLOOD PRESSURE: 77 MMHG | OXYGEN SATURATION: 100 %

## 2023-04-03 DIAGNOSIS — F33.9 RECURRENT MAJOR DEPRESSIVE DISORDER, REMISSION STATUS UNSPECIFIED (HCC): ICD-10-CM

## 2023-04-03 DIAGNOSIS — E11.9 CONTROLLED TYPE 2 DIABETES MELLITUS WITHOUT COMPLICATION, WITHOUT LONG-TERM CURRENT USE OF INSULIN (HCC): ICD-10-CM

## 2023-04-03 DIAGNOSIS — E66.01 SEVERE OBESITY (BMI 35.0-39.9) WITH COMORBIDITY (HCC): Primary | ICD-10-CM

## 2023-04-03 DIAGNOSIS — K21.9 GASTROESOPHAGEAL REFLUX DISEASE WITHOUT ESOPHAGITIS: ICD-10-CM

## 2023-04-03 DIAGNOSIS — Z11.4 SCREENING FOR HIV (HUMAN IMMUNODEFICIENCY VIRUS): ICD-10-CM

## 2023-04-03 DIAGNOSIS — Z11.59 ENCOUNTER FOR HEPATITIS C SCREENING TEST FOR LOW RISK PATIENT: ICD-10-CM

## 2023-04-03 PROCEDURE — 3046F HEMOGLOBIN A1C LEVEL >9.0%: CPT

## 2023-04-03 PROCEDURE — 3078F DIAST BP <80 MM HG: CPT

## 2023-04-03 PROCEDURE — 3074F SYST BP LT 130 MM HG: CPT

## 2023-04-03 PROCEDURE — G8417 CALC BMI ABV UP PARAM F/U: HCPCS

## 2023-04-03 PROCEDURE — 1036F TOBACCO NON-USER: CPT

## 2023-04-03 PROCEDURE — 83036 HEMOGLOBIN GLYCOSYLATED A1C: CPT

## 2023-04-03 PROCEDURE — 99213 OFFICE O/P EST LOW 20 MIN: CPT

## 2023-04-03 PROCEDURE — 2022F DILAT RTA XM EVC RTNOPTHY: CPT

## 2023-04-03 PROCEDURE — G8427 DOCREV CUR MEDS BY ELIG CLIN: HCPCS

## 2023-04-03 RX ORDER — OMEPRAZOLE 20 MG/1
20 CAPSULE, DELAYED RELEASE ORAL EVERY OTHER DAY
Qty: 30 CAPSULE | Refills: 2 | Status: SHIPPED | OUTPATIENT
Start: 2023-04-03

## 2023-04-03 RX ORDER — LISDEXAMFETAMINE DIMESYLATE 40 MG/1
1 CAPSULE ORAL DAILY
COMMUNITY
Start: 2023-03-10

## 2023-04-03 RX ORDER — BUPROPION HYDROCHLORIDE 150 MG/1
150 TABLET ORAL EVERY MORNING
COMMUNITY

## 2023-04-03 ASSESSMENT — ENCOUNTER SYMPTOMS
COUGH: 0
SHORTNESS OF BREATH: 0
CONSTIPATION: 0
ABDOMINAL PAIN: 0
SORE THROAT: 0
DIARRHEA: 0
TROUBLE SWALLOWING: 0

## 2023-04-03 NOTE — PROGRESS NOTES
[facility]  [facility]    Attending Physician Statement  I have discussed the case, including pertinent history and exam findings with the resident physician. I agree with the assessment, plan and orders as documented by the resident. I have reviewed all pertinent PMHx, PSHx, FamHx, SocialHx, medications, and allergies and updated history as appropriate. Patient here for routine follow up of medical problems. DM, remains controlled off medication. Obtain lipid panel, microalb/cr. Referrals for eye exam and foot exam.  HTN, controlled  S/p sleeve gastrectomy. Continue follow up with weight loss clinic. Continue PPI prn. MDD, Vyvance and Wellbutrin c/o [employer]. HIV and Hep C screening        Remainder of medical problems as per resident note. Last seen 6/23/22. Maura Leyva MD  4/3/2023 3:15 PM
150 MG extended release tablet Take 1 tablet by mouth every morning      VYVANSE 40 MG CAPS Take 1 capsule by mouth daily. Max Daily Amount: 1 capsule      omeprazole (PRILOSEC) 20 MG delayed release capsule Take 1 capsule by mouth every other day 30 capsule 2    Multiple Vitamin (MVI, BARIATRIC ADVANTAGE MULTI-FORMULA, CHEW TAB) Take 1 tablet by mouth daily      Calcium Carbonate (CALCI-CHEW PO) Take 1 tablet by mouth 3 times daily      Ferrous Sulfate (IRON PO) Take 1 tablet by mouth daily      Cholecalciferol (VITAMIN D3) 125 MCG (5000 UT) TABS Take 1 tablet by mouth daily      LORATADINE PO Take 10 mg by mouth as needed             OBJECTIVE:    VS:   /77 (Site: Left Upper Arm, Position: Sitting, Cuff Size: Large Adult)   Pulse 88   Temp 97.2 °F (36.2 °C) (Temporal)   Resp 18   Ht 6' (1.829 m)   Wt 290 lb 6.4 oz (131.7 kg)   SpO2 100%   BMI 39.39 kg/m²     EXAM:  Physical Exam  Constitutional:       General: He is not in acute distress. Appearance: Normal appearance. He is not ill-appearing. HENT:      Head: Normocephalic and atraumatic. Cardiovascular:      Rate and Rhythm: Normal rate and regular rhythm. Heart sounds: Normal heart sounds. No murmur heard. No gallop. Pulmonary:      Effort: Pulmonary effort is normal. No respiratory distress. Breath sounds: Normal breath sounds. No wheezing or rales. Abdominal:      General: Abdomen is flat. Bowel sounds are normal. There is no distension. Palpations: Abdomen is soft. Tenderness: There is no abdominal tenderness. Musculoskeletal:      Cervical back: Normal range of motion and neck supple. Skin:     General: Skin is warm and dry. Neurological:      General: No focal deficit present. Mental Status: He is alert and oriented to person, place, and time. Psychiatric:         Mood and Affect: Mood normal.         Thought Content:  Thought content normal.       ASSESSMENT/PLAN:  I have reviewed all pertinent

## 2023-04-03 NOTE — PATIENT INSTRUCTIONS
Dear Pan Sullivan,    Thank you for coming to your appointment today. I hope we have addressed all of your needs. Please make sure to do the following:  - Continue your medications as listed. - Get labs drawn before our next follow up. - Referrals have been made to ophthalmology and podiatry:  If you do not hear from the office in 1 week, please call the number listed. - We will see each other again in 5-6 months     Call for a sooner appointment if you develop any new or worsening symptoms. Have a great day!     Sincerely,  Aj Lara M.D.  4/3/2023  3:20 PM

## 2023-04-19 LAB — HBA1C MFR BLD: 5.1 %

## 2023-06-06 NOTE — PROGRESS NOTES
John Jimenez  1/13/2023  Laparoscopic sleeve gastrectomy   6 weeks Post-Operative Follow-up. Subjective:   John Jimenez is a 35 y.o. male  six weeks post Laparoscopic sleeve gastrectomy. He reports no issues. The patient is not having any pain. Taking the pureed diet well, no nausea, no pain, wounds all healed. Heis not having constipation problems. He is not having swallowing difficulty, is compliant most of the time with the multivitamins and calcium + Vit D. He is meeting fluid recommendations of at least 64 ounces per day and is meeting protein recommendations. Exercise: walking 30 minutes/day- 3 days/week. Weight: (!) 327 lb (148.3 kg)  Today's weight represents a weight loss of 35 pounds. Prior to Admission medications    Medication Sig Start Date End Date Taking? Authorizing Provider   docusate sodium (COLACE) 100 MG capsule Take 100 mg by mouth 3 times daily as needed for Constipation   Yes Historical Provider, MD   sertraline (ZOLOFT) 50 MG tablet Take 1 tablet by mouth nightly 12/15/20  Yes Ortega Champion MD   ondansetron (ZOFRAN-ODT) 4 MG disintegrating tablet Take 1 tablet by mouth every 8 hours as needed for Nausea or Vomiting 12/15/20  Yes Ortega Champion MD   Multiple Vitamin (MVI, BARIATRIC ADVANTAGE MULTI-FORMULA, CHEW TAB) Take 1 tablet by mouth daily   Yes Historical Provider, MD   Calcium Carbonate (CALCI-CHEW PO) Take 1 tablet by mouth 3 times daily   Yes Historical Provider, MD   Ferrous Sulfate (IRON PO) Take 1 tablet by mouth daily   Yes Historical Provider, MD   Cholecalciferol (VITAMIN D3) 125 MCG (5000 UT) TABS Take 1 tablet by mouth daily   Yes Historical Provider, MD   omeprazole (PRILOSEC) 20 MG delayed release capsule Take 1 capsule by mouth daily 10/21/20 10/21/21 Yes Jono Cerda MD   blood glucose monitor strips Accucheck meter-pt check BS daily. 9/3/20  Yes Ortega Champion MD   Lancets MISC Accucheck or what insurance covers.  9/3/20  Yes Ortega Champion MD PER handoff received from RONNA Douglas     Pt resting in bed quietly. NAD noted. No c/o pain.  Fall and safety precautions maintained. Bed alarm activated and audible.. Bed locked in lowest position, with side rails up x2. Call bell and personal items within reach    Misc. Devices MISC Give one Accucheck glucometer monitor or whatever the insurance approves. 9/1/17  Yes Estuardo Stevenson MD   LORATADINE PO Take 10 mg by mouth as needed   Yes Historical Provider, MD      Physical exam:  VITALS:   BP (!) 157/75 (Site: Right Upper Arm, Position: Sitting, Cuff Size: Large Adult)   Pulse 72   Temp 97.8 °F (36.6 °C) (Temporal)   Resp 18   Ht 6' (1.829 m)   Wt (!) 327 lb (148.3 kg)   BMI 44.35 kg/m²    General appearance: alert, appears stated age and cooperative  Head: Normocephalic, without obvious abnormality, atraumatic  Neck: no adenopathy, no carotid bruit, no JVD, supple, symmetrical, trachea midline and thyroid not enlarged, symmetric, no tenderness/mass/nodules  Lungs: clear to auscultation bilaterally  Heart: regular rate and rhythm, S1, S2 normal, no murmur, click, rub or gallop  Abdomen: soft, non-tender; bowel sounds normal; no masses,  no organomegaly  Extremities: extremities normal, atraumatic, no cyanosis or edema    Assessment: doing well 6 weeks post Laparoscopic sleeve gastrectomy. Plan:  Transition to the soft high protein diet. Eat small portions very slowly and chew until the food is liquified before swallowing. Track protein and fluids and bring to the next appointment, maintain adequate variety and balance. Follow up in 6 weeks and contact me if questions arise in the meantime. Exercise 7 days a week at least 30 minutes. Repeat labs before the next appointment. Make sure the bowel move daily by taking fiber.        Physician Signature: Electronically signed by Dr. Korey Peacock MD

## 2023-11-20 ENCOUNTER — HOSPITAL ENCOUNTER (OUTPATIENT)
Age: 43
Discharge: HOME | End: 2023-11-20
Payer: MEDICARE

## 2023-11-20 LAB
ALBUMIN SERPL-MCNC: 4.3 G/DL (ref 3.5–5.2)
ALP SERPL-CCNC: 116 U/L (ref 40–129)
ALT SERPL-CCNC: 11 U/L (ref 0–40)
ANION GAP SERPL CALCULATED.3IONS-SCNC: 9 MMOL/L (ref 7–16)
AST SERPL-CCNC: 15 U/L (ref 0–39)
BILIRUB SERPL-MCNC: 0.5 MG/DL (ref 0–1.2)
BUN SERPL-MCNC: 7 MG/DL (ref 6–20)
CALCIUM SERPL-MCNC: 9.7 MG/DL (ref 8.6–10.2)
CHLORIDE SERPL-SCNC: 100 MMOL/L (ref 98–107)
CO2 SERPL-SCNC: 30 MMOL/L (ref 22–29)
CREAT SERPL-MCNC: 1 MG/DL (ref 0.7–1.2)
ERYTHROCYTE [DISTWIDTH] IN BLOOD BY AUTOMATED COUNT: 14 % (ref 11.5–15)
GFR SERPL CREATININE-BSD FRML MDRD: >60 ML/MIN/1.73M2
GLUCOSE SERPL-MCNC: 91 MG/DL (ref 74–99)
HCT VFR BLD AUTO: 47.9 % (ref 37–54)
HGB BLD-MCNC: 16 G/DL (ref 12.5–16.5)
MCH RBC QN AUTO: 26.4 PG (ref 26–35)
MCHC RBC AUTO-ENTMCNC: 33.4 G/DL (ref 32–34.5)
MCV RBC AUTO: 78.9 FL (ref 80–99.9)
PLATELET # BLD AUTO: 288 K/UL (ref 130–450)
PMV BLD AUTO: 10.3 FL (ref 7–12)
POTASSIUM SERPL-SCNC: 4.9 MMOL/L (ref 3.5–5)
PROT SERPL-MCNC: 7.8 G/DL (ref 6.4–8.3)
RBC # BLD AUTO: 6.07 M/UL (ref 3.8–5.8)
SODIUM SERPL-SCNC: 139 MMOL/L (ref 132–146)
WBC OTHER # BLD: 7.2 K/UL (ref 4.5–11.5)

## 2023-11-20 PROCEDURE — 84255 ASSAY OF SELENIUM: CPT

## 2023-11-20 PROCEDURE — 84630 ASSAY OF ZINC: CPT

## 2023-11-20 PROCEDURE — 86803 HEPATITIS C AB TEST: CPT

## 2023-11-20 PROCEDURE — 84425 ASSAY OF VITAMIN B-1: CPT

## 2023-11-20 PROCEDURE — 82465 ASSAY BLD/SERUM CHOLESTEROL: CPT

## 2023-11-20 PROCEDURE — 82607 VITAMIN B-12: CPT

## 2023-11-20 PROCEDURE — 82728 ASSAY OF FERRITIN: CPT

## 2023-11-20 PROCEDURE — 84134 ASSAY OF PREALBUMIN: CPT

## 2023-11-20 PROCEDURE — 82306 VITAMIN D 25 HYDROXY: CPT

## 2023-11-20 PROCEDURE — 82525 ASSAY OF COPPER: CPT

## 2023-11-20 PROCEDURE — 80061 LIPID PANEL: CPT

## 2023-11-20 PROCEDURE — 82746 ASSAY OF FOLIC ACID SERUM: CPT

## 2023-11-20 PROCEDURE — 80053 COMPREHEN METABOLIC PANEL: CPT

## 2023-11-20 PROCEDURE — 85027 COMPLETE CBC AUTOMATED: CPT

## 2023-11-20 PROCEDURE — 84478 ASSAY OF TRIGLYCERIDES: CPT

## 2023-11-20 PROCEDURE — 36415 COLL VENOUS BLD VENIPUNCTURE: CPT

## 2023-11-20 PROCEDURE — 87536 HIV-1 QUANT&REVRSE TRNSCRPJ: CPT

## 2023-11-21 LAB
25(OH)D3 SERPL-MCNC: 26.3 NG/ML (ref 30–100)
CHOLEST SERPL-MCNC: 156 MG/DL
CHOLEST SERPL-MCNC: 159 MG/DL
FERRITIN SERPL-MCNC: 232 NG/ML
FOLATE SERPL-MCNC: 7.5 NG/ML (ref 4.8–24.2)
HCV AB SERPL QL IA: NONREACTIVE
HDLC SERPL-MCNC: 41 MG/DL
LDLC SERPL CALC-MCNC: 95 MG/DL
PREALB SERPL-MCNC: 16 MG/DL (ref 20–40)
TRIGL SERPL-MCNC: 100 MG/DL
TRIGL SERPL-MCNC: 98 MG/DL
VIT B12 SERPL-MCNC: 466 PG/ML (ref 211–946)
VLDLC SERPL CALC-MCNC: 20 MG/DL

## 2023-11-22 LAB
HIV1 RNA # SERPL NAA+PROBE: NOT DETECTED {COPIES}/ML
SPECIMEN SOURCE: NORMAL

## 2023-11-23 LAB — ZINC SERPL-MCNC: 62.7 UG/DL (ref 60–120)

## 2023-11-24 LAB
COPPER SERPL-MCNC: 124.3 UG/DL (ref 70–140)
SELENIUM SERPL-MCNC: 114.2 UG/L (ref 23–190)
VIT B1 PYROPHOSHATE BLD-SCNC: 155 NMOL/L (ref 70–180)

## 2023-11-29 ENCOUNTER — OFFICE VISIT (OUTPATIENT)
Dept: BARIATRICS/WEIGHT MGMT | Age: 43
End: 2023-11-29
Payer: MEDICARE

## 2023-11-29 VITALS
TEMPERATURE: 97.3 F | SYSTOLIC BLOOD PRESSURE: 133 MMHG | DIASTOLIC BLOOD PRESSURE: 76 MMHG | RESPIRATION RATE: 20 BRPM | WEIGHT: 275 LBS | HEART RATE: 80 BPM | BODY MASS INDEX: 37.25 KG/M2 | HEIGHT: 72 IN

## 2023-11-29 DIAGNOSIS — K91.2 MALNUTRITION FOLLOWING GASTROINTESTINAL SURGERY: Primary | ICD-10-CM

## 2023-11-29 PROCEDURE — G8417 CALC BMI ABV UP PARAM F/U: HCPCS | Performed by: NURSE PRACTITIONER

## 2023-11-29 PROCEDURE — G8427 DOCREV CUR MEDS BY ELIG CLIN: HCPCS | Performed by: NURSE PRACTITIONER

## 2023-11-29 PROCEDURE — 1036F TOBACCO NON-USER: CPT | Performed by: NURSE PRACTITIONER

## 2023-11-29 PROCEDURE — 3075F SYST BP GE 130 - 139MM HG: CPT | Performed by: NURSE PRACTITIONER

## 2023-11-29 PROCEDURE — 3078F DIAST BP <80 MM HG: CPT | Performed by: NURSE PRACTITIONER

## 2023-11-29 PROCEDURE — 99211 OFF/OP EST MAY X REQ PHY/QHP: CPT

## 2023-11-29 PROCEDURE — 99213 OFFICE O/P EST LOW 20 MIN: CPT | Performed by: NURSE PRACTITIONER

## 2023-11-29 PROCEDURE — G8484 FLU IMMUNIZE NO ADMIN: HCPCS | Performed by: NURSE PRACTITIONER

## 2023-11-29 NOTE — PROGRESS NOTES
Carmencita Sims  11/29/2023  [address]   Laparoscopic Sleeve Gastrectomy  3 year  Post-Operative Follow-up     Chief Complaint   Patient presents with    Follow Up After Procedure     3 yr LRYGB. Water intake is around 64 oz daily. Protein through shakes and foods. Vitamin intake is good. Bowel movements are good. Carmencita Sims is a 37 y.o. male who is 3 years s/p LGS  post Laparoscopic Sleeve Gastrectomy surgery. Reports that he is doing good. He is not having swallowing difficulty. Patient denies nausea and vomiting. Patient denies gastric reflux symptoms, takes omeprazole as needed. Bowel movements are normal per patient. Patient is not taking fiber supplements. Patient is compliant most of the time with the MVI, calcium, iron. He is meeting fluid recommendations of at least 64 ounces per day and is meeting protein recommendations. He  is exercising:  has been walking at the grocery store 1 time per week . Last Surgical Weight Loss:      11/29/2023    10:53 AM   Surgical Weight Loss Tracker   Consult Date 5/6/2020   Initial Height 6' 0\"   Initial Weight 375 lb   Initial BMI 50.85   Ideal Body Weight 188 lb   Surgery Date 11/16/2020   Pre-Surgical Height 6' 0\"   Pre-Surgical Weight 362 lb   Pre Surgery BMI 49.09   Weight to Lose 174 lb   Date 11/29/2023   Height 6' 0\"   Weight 275 lb   BMI 37.29   Weight Change -87 lb   Total Weight Change -87 lb   % EBWL 50%       Weight=124.7 kg (275 lb)  Today's weight represents a total weight loss of 87 pounds since surgery and regained 0 pounds since the last visit. Lowest wt 255. Prior to Admission medications    Medication Sig Start Date End Date Taking? Authorizing Provider   buPROPion (WELLBUTRIN XL) 150 MG extended release tablet Take 1 tablet by mouth every morning   Yes Hina Da Silva MD   VYVANSE 40 MG CAPS Take 1 capsule by mouth daily.  Max Daily Amount: 1 capsule 3/10/23  Yes Hina Da Silva MD

## 2023-11-29 NOTE — PATIENT INSTRUCTIONS
Please continue to take your vitamin and mineral supplements as instructed. If you received a blood work prescription today for laboratory monitoring due prior to your next routine follow-up visit, please have this blood work obtained 10 to 14 days prior to your next visit. It is important to fast for 12 hours prior to routine weight loss surgery blood work, EXCEPT for drinking water, to ensure accuracy of results. Please report nausea, vomiting, abdominal pain, or any other problems you experience to your surgeon. For problems related to weight loss surgery, it is best to go to [facility] [place] [place] Department and have your surgeon paged.     Last Surgical Weight Loss:      11/29/2023    10:53 AM   Surgical Weight Loss Tracker   Consult Date 5/6/2020   Initial Height 6' 0\"   Initial Weight 375 lb   Initial BMI 50.85   Ideal Body Weight 188 lb   Surgery Date 11/16/2020   Pre-Surgical Height 6' 0\"   Pre-Surgical Weight 362 lb   Pre Surgery BMI 49.09   Weight to Lose 174 lb   Date 11/29/2023   Height 6' 0\"   Weight 275 lb   BMI 37.29   Weight Change -87 lb   Total Weight Change -87 lb   % EBWL 50%

## 2023-11-30 DIAGNOSIS — K21.9 GASTROESOPHAGEAL REFLUX DISEASE WITHOUT ESOPHAGITIS: ICD-10-CM

## 2023-11-30 RX ORDER — OMEPRAZOLE 20 MG/1
20 CAPSULE, DELAYED RELEASE ORAL EVERY OTHER DAY
Qty: 30 CAPSULE | Refills: 2 | OUTPATIENT
Start: 2023-11-30

## 2024-04-09 NOTE — PROGRESS NOTES
[facility]  Internal Medicine Residency Program  ACC Note      SUBJECTIVE:  CC: had concerns including Follow-up, Results, Shoulder Injury (Pt complains of left shoulder injury for about 2-3 weeks. ), Diabetes, and Hypertension.    Last Visit: 4/3/23     HPI:  Maurizio De Jesus is a 44 y.o.male with PMH of HTN, HLD, ALVINO, WILL, GERD, obesity and vitamin D deficiency presenting to [facility] for follow up visit.     DM:   A1C - 5.1 on 4/3/23   Will  repeat today - 4.8   No currently on any medication   Recently saw podiatry - (Dr. David) 5/30/23  Will send referral for eye doctor   Will check for Albumin/Cr ratio today      Hypertension   BP today 130/66  Off medications      GERD:  On Omeprazole 20 mg  every other day       Obesity:   Follows with bariatric medicine and lat visit 11/29/23  Laparoscopic sleeve gastrectomy on 11/16/2020         Anxiety:   [facility] for Vyvanx and Auvelity   Stable and doing well  Follows monthly - with Dr. Turner office and has an appointment next week     Review of Systems   Constitutional:  Negative for chills and fever.   HENT:  Negative for sore throat and trouble swallowing.    Respiratory:  Negative for cough and shortness of breath.    Cardiovascular:  Negative for chest pain and leg swelling.   Gastrointestinal:  Negative for abdominal pain, constipation and diarrhea.   Genitourinary:  Negative for difficulty urinating and dysuria.   Musculoskeletal:  Negative for arthralgias and myalgias.   Neurological:  Negative for dizziness and light-headedness.       Outpatient Medications Marked as Taking for the 4/10/24 encounter (Office Visit) with Celena Buckley MD   Medication Sig Dispense Refill    AUVELITY  MG TBCR Take 1 tablet by mouth 2 times daily      VYVANSE 60 MG CAPS Take 1 capsule by mouth daily. Max Daily Amount: 1 capsule      lidocaine 4 % external patch Place 1 patch onto the skin daily 30 patch 0    omeprazole (PRILOSEC) 20 MG

## 2024-04-10 ENCOUNTER — OFFICE VISIT (OUTPATIENT)
Dept: INTERNAL MEDICINE | Age: 44
End: 2024-04-10
Payer: MEDICARE

## 2024-04-10 VITALS
HEART RATE: 75 BPM | WEIGHT: 262.6 LBS | BODY MASS INDEX: 38.89 KG/M2 | DIASTOLIC BLOOD PRESSURE: 66 MMHG | SYSTOLIC BLOOD PRESSURE: 130 MMHG | HEIGHT: 69 IN | RESPIRATION RATE: 18 BRPM | TEMPERATURE: 97.2 F | OXYGEN SATURATION: 98 %

## 2024-04-10 DIAGNOSIS — S46.912A STRAIN OF LEFT SHOULDER, INITIAL ENCOUNTER: ICD-10-CM

## 2024-04-10 DIAGNOSIS — E11.9 CONTROLLED TYPE 2 DIABETES MELLITUS WITHOUT COMPLICATION, WITHOUT LONG-TERM CURRENT USE OF INSULIN (HCC): Primary | ICD-10-CM

## 2024-04-10 LAB — HBA1C MFR BLD: 4.8 %

## 2024-04-10 PROCEDURE — G8417 CALC BMI ABV UP PARAM F/U: HCPCS

## 2024-04-10 PROCEDURE — 99212 OFFICE O/P EST SF 10 MIN: CPT

## 2024-04-10 PROCEDURE — 3075F SYST BP GE 130 - 139MM HG: CPT

## 2024-04-10 PROCEDURE — 2022F DILAT RTA XM EVC RTNOPTHY: CPT

## 2024-04-10 PROCEDURE — 99213 OFFICE O/P EST LOW 20 MIN: CPT

## 2024-04-10 PROCEDURE — 3044F HG A1C LEVEL LT 7.0%: CPT

## 2024-04-10 PROCEDURE — 83036 HEMOGLOBIN GLYCOSYLATED A1C: CPT

## 2024-04-10 PROCEDURE — 3078F DIAST BP <80 MM HG: CPT

## 2024-04-10 PROCEDURE — 1036F TOBACCO NON-USER: CPT

## 2024-04-10 PROCEDURE — G8427 DOCREV CUR MEDS BY ELIG CLIN: HCPCS

## 2024-04-10 RX ORDER — LISDEXAMFETAMINE DIMESYLATE 60 MG/1
1 CAPSULE ORAL DAILY
COMMUNITY
Start: 2024-03-22

## 2024-04-10 RX ORDER — DEXTROMETHORPHAN HYDROBROMIDE, BUPROPION HYDROCHLORIDE 105; 45 MG/1; MG/1
1 TABLET, MULTILAYER, EXTENDED RELEASE ORAL 2 TIMES DAILY
COMMUNITY
Start: 2024-03-25

## 2024-04-10 RX ORDER — LIDOCAINE 4 G/G
1 PATCH TOPICAL DAILY
Qty: 30 PATCH | Refills: 0 | Status: SHIPPED | OUTPATIENT
Start: 2024-04-10 | End: 2024-05-10

## 2024-04-10 RX ORDER — LIDOCAINE 4 G/G
1 PATCH TOPICAL DAILY
Qty: 30 PATCH | Refills: 0 | Status: SHIPPED
Start: 2024-04-10 | End: 2024-04-10

## 2024-04-10 SDOH — ECONOMIC STABILITY: FOOD INSECURITY: WITHIN THE PAST 12 MONTHS, THE FOOD YOU BOUGHT JUST DIDN'T LAST AND YOU DIDN'T HAVE MONEY TO GET MORE.: NEVER TRUE

## 2024-04-10 SDOH — ECONOMIC STABILITY: FOOD INSECURITY: WITHIN THE PAST 12 MONTHS, YOU WORRIED THAT YOUR FOOD WOULD RUN OUT BEFORE YOU GOT MONEY TO BUY MORE.: NEVER TRUE

## 2024-04-10 SDOH — ECONOMIC STABILITY: INCOME INSECURITY: HOW HARD IS IT FOR YOU TO PAY FOR THE VERY BASICS LIKE FOOD, HOUSING, MEDICAL CARE, AND HEATING?: NOT VERY HARD

## 2024-04-10 ASSESSMENT — PATIENT HEALTH QUESTIONNAIRE - PHQ9
7. TROUBLE CONCENTRATING ON THINGS, SUCH AS READING THE NEWSPAPER OR WATCHING TELEVISION: NEARLY EVERY DAY
4. FEELING TIRED OR HAVING LITTLE ENERGY: NEARLY EVERY DAY
SUM OF ALL RESPONSES TO PHQ QUESTIONS 1-9: 15
SUM OF ALL RESPONSES TO PHQ9 QUESTIONS 1 & 2: 2
5. POOR APPETITE OR OVEREATING: NEARLY EVERY DAY
SUM OF ALL RESPONSES TO PHQ QUESTIONS 1-9: 15
10. IF YOU CHECKED OFF ANY PROBLEMS, HOW DIFFICULT HAVE THESE PROBLEMS MADE IT FOR YOU TO DO YOUR WORK, TAKE CARE OF THINGS AT HOME, OR GET ALONG WITH OTHER PEOPLE: VERY DIFFICULT
3. TROUBLE FALLING OR STAYING ASLEEP: NEARLY EVERY DAY
2. FEELING DOWN, DEPRESSED OR HOPELESS: SEVERAL DAYS
SUM OF ALL RESPONSES TO PHQ QUESTIONS 1-9: 15
8. MOVING OR SPEAKING SO SLOWLY THAT OTHER PEOPLE COULD HAVE NOTICED. OR THE OPPOSITE, BEING SO FIGETY OR RESTLESS THAT YOU HAVE BEEN MOVING AROUND A LOT MORE THAN USUAL: NOT AT ALL
1. LITTLE INTEREST OR PLEASURE IN DOING THINGS: SEVERAL DAYS
6. FEELING BAD ABOUT YOURSELF - OR THAT YOU ARE A FAILURE OR HAVE LET YOURSELF OR YOUR FAMILY DOWN: SEVERAL DAYS
SUM OF ALL RESPONSES TO PHQ QUESTIONS 1-9: 15
9. THOUGHTS THAT YOU WOULD BE BETTER OFF DEAD, OR OF HURTING YOURSELF: NOT AT ALL

## 2024-04-10 NOTE — PROGRESS NOTES
[facility]  Internal Medicine Residency Clinic    Attending Physician Statement  I have discussed the case, including pertinent history and exam findings with the resident physician.  I agree with the assessment, plan and orders as documented by the resident. I have reviewed the relevant PMHx, PSHx, FamHx, SocialHx, medications, and allergies and updated history as appropriate.    Patient presents for routine follow up of medical problems.   No acute complaints today    Ongoing weight loss with active following in Bariatric clinic    Planned surveillance labs including vitamin assessments due to malabsorption risk to be completed by patient   Microalbumin to be ordered for follow-up     Type II DM   S/P Bariatric intervention with significant weight loss   Off medications  A1c 4.8% today   Foot exam today completed- neuropathy- follows with Podiatry- needs to make follow-up appt   Eye exam referral  Labs to be followed with Bariatric assessment     Anxiety/Depression  Following with Psychiatry  Symptoms stable on medication    Stage III Class Obesity s/p bariatric intervention    Continued ongoing weight loss   BMI continues to improve   Continue lifestyle changes   Vitamin/lab surveillance ordered and to be followed        Remainder of medical problems as per resident note.    Martín Peña MD, FACP   4/10/2024 2:35 PM

## 2024-04-10 NOTE — PATIENT INSTRUCTIONS
Dear Maurizio De Jesus,    Thank you for coming to your appointment today. I hope we have addressed all of your needs.     Please make sure to do the following:  - Continue your medications as listed.  - Get labs drawn before our next follow up.  - Set up another appointment with podiatry:   Rubens David DPM   [address] [place] [address]   [phone]     - We will see each other again in 6 months    Call for a sooner appointment if you develop any new or worsening symptoms.    Have a great day!    Sincerely,  Celena Buckley M.D.  4/10/2024  2:10 PM

## 2024-07-12 PROBLEM — E66.01 MORBID OBESITY WITH BMI OF 50.0-59.9, ADULT (HCC): Status: RESOLVED | Noted: 2019-09-13 | Resolved: 2024-07-12

## 2024-07-27 DIAGNOSIS — K21.9 GASTROESOPHAGEAL REFLUX DISEASE WITHOUT ESOPHAGITIS: ICD-10-CM

## 2024-07-29 DIAGNOSIS — K21.9 GASTROESOPHAGEAL REFLUX DISEASE WITHOUT ESOPHAGITIS: ICD-10-CM

## 2024-07-29 RX ORDER — OMEPRAZOLE 20 MG/1
20 CAPSULE, DELAYED RELEASE ORAL EVERY OTHER DAY
Qty: 30 CAPSULE | Refills: 2 | Status: SHIPPED | OUTPATIENT
Start: 2024-07-29

## 2024-07-29 NOTE — TELEPHONE ENCOUNTER
Last Appointment:  4/10/2024  Future Appointments   Date Time Provider Department Center   11/20/2024 11:00 AM Chey Damon APRN - CNP Surg Weight [facility]

## 2024-11-13 ENCOUNTER — HOSPITAL ENCOUNTER (OUTPATIENT)
Age: 44
Discharge: HOME | End: 2024-11-13
Payer: MEDICARE

## 2024-11-13 LAB
25(OH)D3 SERPL-MCNC: 33.2 NG/ML (ref 30–100)
25(OH)D3 SERPL-MCNC: 34.2 NG/ML (ref 30–100)
ALBUMIN SERPL-MCNC: 4.2 G/DL (ref 3.5–5.2)
ALP SERPL-CCNC: 99 U/L (ref 40–129)
ALT SERPL-CCNC: 14 U/L (ref 0–40)
ANION GAP SERPL CALCULATED.3IONS-SCNC: 9 MMOL/L (ref 7–16)
AST SERPL-CCNC: 17 U/L (ref 0–39)
BILIRUB SERPL-MCNC: 0.6 MG/DL (ref 0–1.2)
BUN SERPL-MCNC: 10 MG/DL (ref 6–20)
CALCIUM SERPL-MCNC: 9.8 MG/DL (ref 8.6–10.2)
CHLORIDE SERPL-SCNC: 103 MMOL/L (ref 98–107)
CO2 SERPL-SCNC: 28 MMOL/L (ref 22–29)
CREAT SERPL-MCNC: 0.9 MG/DL (ref 0.7–1.2)
CREAT UR-MCNC: 137.2 MG/DL (ref 40–278)
ERYTHROCYTE [DISTWIDTH] IN BLOOD BY AUTOMATED COUNT: 13.7 % (ref 11.5–15)
FERRITIN SERPL-MCNC: 311 NG/ML
FOLATE SERPL-MCNC: 18.6 NG/ML (ref 4.8–24.2)
GFR, ESTIMATED: >90 ML/MIN/1.73M2
GLUCOSE SERPL-MCNC: 98 MG/DL (ref 74–99)
HCT VFR BLD AUTO: 43.6 % (ref 37–54)
HGB BLD-MCNC: 15.1 G/DL (ref 12.5–16.5)
MCH RBC QN AUTO: 26.4 PG (ref 26–35)
MCHC RBC AUTO-ENTMCNC: 34.6 G/DL (ref 32–34.5)
MCV RBC AUTO: 76.2 FL (ref 80–99.9)
PLATELET # BLD AUTO: 246 K/UL (ref 130–450)
PMV BLD AUTO: 9.4 FL (ref 7–12)
POTASSIUM SERPL-SCNC: 4.4 MMOL/L (ref 3.5–5)
PROT SERPL-MCNC: 7.9 G/DL (ref 6.4–8.3)
RBC # BLD AUTO: 5.72 M/UL (ref 3.8–5.8)
SODIUM SERPL-SCNC: 140 MMOL/L (ref 132–146)
TOTAL PROTEIN, URINE: 7 MG/DL (ref 0–12)
URINE TOTAL PROTEIN CREATININE RATIO: 0.05 (ref 0–0.2)
VIT B12 SERPL-MCNC: 766 PG/ML (ref 211–946)
WBC OTHER # BLD: 6.5 K/UL (ref 4.5–11.5)

## 2024-11-13 PROCEDURE — 82306 VITAMIN D 25 HYDROXY: CPT

## 2024-11-13 PROCEDURE — 82746 ASSAY OF FOLIC ACID SERUM: CPT

## 2024-11-13 PROCEDURE — 84156 ASSAY OF PROTEIN URINE: CPT

## 2024-11-13 PROCEDURE — 82525 ASSAY OF COPPER: CPT

## 2024-11-13 PROCEDURE — 84630 ASSAY OF ZINC: CPT

## 2024-11-13 PROCEDURE — 82728 ASSAY OF FERRITIN: CPT

## 2024-11-13 PROCEDURE — 82607 VITAMIN B-12: CPT

## 2024-11-13 PROCEDURE — 82570 ASSAY OF URINE CREATININE: CPT

## 2024-11-13 PROCEDURE — 84590 ASSAY OF VITAMIN A: CPT

## 2024-11-13 PROCEDURE — 36415 COLL VENOUS BLD VENIPUNCTURE: CPT

## 2024-11-13 PROCEDURE — 80053 COMPREHEN METABOLIC PANEL: CPT

## 2024-11-13 PROCEDURE — 84425 ASSAY OF VITAMIN B-1: CPT

## 2024-11-13 PROCEDURE — 85027 COMPLETE CBC AUTOMATED: CPT

## 2024-11-14 ENCOUNTER — TELEPHONE (OUTPATIENT)
Dept: INTERNAL MEDICINE | Age: 44
End: 2024-11-14

## 2024-11-14 DIAGNOSIS — R80.1 PERSISTENT PROTEINURIA: Primary | ICD-10-CM

## 2024-11-14 DIAGNOSIS — R80.1 PERSISTENT PROTEINURIA: ICD-10-CM

## 2024-11-14 RX ORDER — LISINOPRIL 2.5 MG/1
2.5 TABLET ORAL DAILY
Qty: 30 TABLET | Refills: 2 | Status: SHIPPED | OUTPATIENT
Start: 2024-11-14 | End: 2025-02-12

## 2024-11-14 NOTE — TELEPHONE ENCOUNTER
Patient's results showed continued microalbuminuria. He is currently not on any medication and last visit showed controlled BP. Attempted to call patient to discuss results but he did not answer. Will also send [employer]ivo message. Will start Lisinopril 2.5 mg daily with a repeat BMP ordered to recheck Cr to confirm is stable. Next visit in in February 2025.

## 2024-11-15 LAB — COPPER SERPL-MCNC: 109.5 UG/DL (ref 70–140)

## 2024-11-15 RX ORDER — LISINOPRIL 2.5 MG/1
2.5 TABLET ORAL DAILY
Qty: 90 TABLET | OUTPATIENT
Start: 2024-11-15 | End: 2025-02-13

## 2024-11-16 LAB — ZINC SERPL-MCNC: 85 UG/DL (ref 60–120)

## 2024-11-19 LAB
RETINYL PALMITATE: <0.02 MG/L (ref 0–0.1)
VIT B1 PYROPHOSHATE BLD-SCNC: 195 NMOL/L (ref 70–180)
VITAMIN A LEVEL: 0.42 MG/L (ref 0.3–1.2)
VITAMIN A, INTERP: NORMAL

## 2024-11-20 ENCOUNTER — OFFICE VISIT (OUTPATIENT)
Dept: BARIATRICS/WEIGHT MGMT | Age: 44
End: 2024-11-20
Payer: MEDICARE

## 2024-11-20 VITALS
HEART RATE: 94 BPM | WEIGHT: 281 LBS | BODY MASS INDEX: 41.62 KG/M2 | SYSTOLIC BLOOD PRESSURE: 106 MMHG | HEIGHT: 69 IN | DIASTOLIC BLOOD PRESSURE: 64 MMHG

## 2024-11-20 DIAGNOSIS — K91.2 MALNUTRITION FOLLOWING GASTROINTESTINAL SURGERY: Primary | ICD-10-CM

## 2024-11-20 DIAGNOSIS — E11.9 CONTROLLED TYPE 2 DIABETES MELLITUS WITHOUT COMPLICATION, WITHOUT LONG-TERM CURRENT USE OF INSULIN (HCC): ICD-10-CM

## 2024-11-20 PROCEDURE — 1036F TOBACCO NON-USER: CPT | Performed by: NURSE PRACTITIONER

## 2024-11-20 PROCEDURE — 99213 OFFICE O/P EST LOW 20 MIN: CPT | Performed by: NURSE PRACTITIONER

## 2024-11-20 PROCEDURE — 3074F SYST BP LT 130 MM HG: CPT | Performed by: NURSE PRACTITIONER

## 2024-11-20 PROCEDURE — G8417 CALC BMI ABV UP PARAM F/U: HCPCS | Performed by: NURSE PRACTITIONER

## 2024-11-20 PROCEDURE — G8427 DOCREV CUR MEDS BY ELIG CLIN: HCPCS | Performed by: NURSE PRACTITIONER

## 2024-11-20 PROCEDURE — 2022F DILAT RTA XM EVC RTNOPTHY: CPT | Performed by: NURSE PRACTITIONER

## 2024-11-20 PROCEDURE — 99211 OFF/OP EST MAY X REQ PHY/QHP: CPT

## 2024-11-20 PROCEDURE — G8484 FLU IMMUNIZE NO ADMIN: HCPCS | Performed by: NURSE PRACTITIONER

## 2024-11-20 PROCEDURE — 3078F DIAST BP <80 MM HG: CPT | Performed by: NURSE PRACTITIONER

## 2024-11-20 PROCEDURE — 3044F HG A1C LEVEL LT 7.0%: CPT | Performed by: NURSE PRACTITIONER

## 2024-11-20 NOTE — PATIENT INSTRUCTIONS
Please continue to take your vitamin and mineral supplements as instructed.      If you received a blood work prescription today for laboratory monitoring due prior to your next routine follow-up visit, please have this blood work obtained 10 to 14 days prior to your next visit.  It is important to fast for 12 hours prior to routine weight loss surgery blood work, EXCEPT for drinking water, to ensure accuracy of results.    Please report nausea, vomiting, abdominal pain, or any other problems you experience to your surgeon.  For problems related to weight loss surgery, it is best to go to [facility] Emergency Department and have your surgeon paged.    Last Surgical Weight Loss:      11/29/2023    10:53 AM 11/20/2024    11:07 AM   Surgical Weight Loss Tracker   Consult Date 5/6/2020    Initial Height 6' 0\"    Initial Weight 375 lb    Initial BMI 50.85    Ideal Body Weight 188 lb    Surgery Date 11/16/2020    Pre-Surgical Height 6' 0\"    Pre-Surgical Weight 362 lb    Pre Surgery BMI 49.09    Weight to Lose 174 lb    Date 11/29/2023 11/20/2024   Height 6' 0\" 5' 9\"   Weight 275 lb 281 lb   BMI 37.29 41.49   Weight Change -87 lb 6 lb   Total Weight Change -87 lb -81 lb   % EBWL 50% 47%

## 2024-11-20 NOTE — PROGRESS NOTES
Concern    Not on file   Social History Narrative    Drinks 2 L iced tea daily.      Social Determinants of Health     Financial Resource Strain: Low Risk  (4/10/2024)    Overall Financial Resource Strain (CARDIA)     Difficulty of Paying Living Expenses: Not very hard   Food Insecurity: No Food Insecurity (4/10/2024)    Hunger Vital Sign     Worried About Running Out of Food in the Last Year: Never true     Ran Out of Food in the Last Year: Never true   Transportation Needs: Unmet Transportation Needs (4/10/2024)    PRAPARE - Transportation     Lack of Transportation (Medical): Not on file     Lack of Transportation (Non-Medical): Yes   Physical Activity: Not on file   Stress: Not on file   Social Connections: Not on file   Intimate Partner Violence: Not on file   Housing Stability: Unknown (4/10/2024)    Housing Stability Vital Sign     Unable to Pay for Housing in the Last Year: Not on file     Number of Places Lived in the Last Year: Not on file     Unstable Housing in the Last Year: No           A complete 10 system review was performed and are otherwise negative unless mentioned in the above HPI. Specific negatives are listed below but may not include all those reviewed.    General ROS: negative obtundation, AMS  ENT ROS: negative rhinorrhea, epistaxis  Allergy and Immunology ROS: negative itchy/watery eyes or nasal congestion  Hematological and Lymphatic ROS: negative spontaneous bleeding or bruising  Endocrine ROS: negative  lethargy, mood swings, palpitations or polydipsia/polyuria  Respiratory ROS: negative sputum changes, stridor, tachypnea or wheezing  Cardiovascular ROS: negative for - loss of consciousness, murmur or orthopnea  Gastrointestinal ROS: negative for - hematochezia or hematemesis  Genitor-Urinary ROS: negative for -  genital discharge or hematuria  Musculoskeletal ROS: negative for - focal weakness, gangrene  Psych/Neuro ROS: negative for - visual or auditory hallucinations, suicidal

## 2025-01-26 DIAGNOSIS — K21.9 GASTROESOPHAGEAL REFLUX DISEASE WITHOUT ESOPHAGITIS: ICD-10-CM

## 2025-01-27 NOTE — TELEPHONE ENCOUNTER
Name of Medication(s) Requested:  Requested Prescriptions     Pending Prescriptions Disp Refills    omeprazole (PRILOSEC) 20 MG delayed release capsule [Pharmacy Med Name: OMEPRAZOLE 20MG CAPSULES] 30 capsule 2     Sig: TAKE 1 CAPSULE BY MOUTH EVERY OTHER DAY       Medication is on current medication list Yes    Dosage and directions were verified? Yes    Quantity verified: 30 day supply     Pharmacy Verified?  Yes    Last Appointment:  04/10/2024    Future appts:  Future Appointments   Date Time Provider Department Center   2/17/2025 10:30 AM Celena Buckley MD [facility] DEP   11/19/2025 11:00 AM Chey Damon, APRN - CNP Surg Weight [facility]        (If no appt send self scheduling link. .REFILLAPPT)  Scheduling request sent?     [] Yes  [x] No    Does patient need updated?  [] Yes  [x] No

## 2025-02-24 ENCOUNTER — OFFICE VISIT (OUTPATIENT)
Dept: INTERNAL MEDICINE | Age: 45
End: 2025-02-24

## 2025-02-24 VITALS
SYSTOLIC BLOOD PRESSURE: 107 MMHG | HEART RATE: 90 BPM | OXYGEN SATURATION: 99 % | BODY MASS INDEX: 42.06 KG/M2 | RESPIRATION RATE: 14 BRPM | WEIGHT: 284 LBS | DIASTOLIC BLOOD PRESSURE: 71 MMHG | TEMPERATURE: 97.2 F | HEIGHT: 69 IN

## 2025-02-24 DIAGNOSIS — E11.9 CONTROLLED TYPE 2 DIABETES MELLITUS WITHOUT COMPLICATION, WITHOUT LONG-TERM CURRENT USE OF INSULIN (HCC): ICD-10-CM

## 2025-02-24 DIAGNOSIS — R80.1 PERSISTENT PROTEINURIA: ICD-10-CM

## 2025-02-24 DIAGNOSIS — Z12.11 COLON CANCER SCREENING: Primary | ICD-10-CM

## 2025-02-24 LAB
CREATININE URINE: 212 MG/DL (ref 40–278)
MICROALBUMIN/CREAT 24H UR: <12 MG/L (ref 0–19)
MICROALBUMIN/CREAT UR-RTO: NORMAL MCG/MG CREAT (ref 0–30)

## 2025-02-24 RX ORDER — LISINOPRIL 2.5 MG/1
2.5 TABLET ORAL DAILY
Qty: 30 TABLET | Refills: 2 | Status: SHIPPED | OUTPATIENT
Start: 2025-02-24 | End: 2025-05-25

## 2025-02-24 SDOH — ECONOMIC STABILITY: FOOD INSECURITY: WITHIN THE PAST 12 MONTHS, YOU WORRIED THAT YOUR FOOD WOULD RUN OUT BEFORE YOU GOT MONEY TO BUY MORE.: NEVER TRUE

## 2025-02-24 SDOH — ECONOMIC STABILITY: FOOD INSECURITY: WITHIN THE PAST 12 MONTHS, THE FOOD YOU BOUGHT JUST DIDN'T LAST AND YOU DIDN'T HAVE MONEY TO GET MORE.: NEVER TRUE

## 2025-02-24 ASSESSMENT — LIFESTYLE VARIABLES
HOW MANY STANDARD DRINKS CONTAINING ALCOHOL DO YOU HAVE ON A TYPICAL DAY: 1 OR 2
HOW OFTEN DO YOU HAVE A DRINK CONTAINING ALCOHOL: MONTHLY OR LESS

## 2025-02-24 ASSESSMENT — PATIENT HEALTH QUESTIONNAIRE - PHQ9
SUM OF ALL RESPONSES TO PHQ QUESTIONS 1-9: 11
2. FEELING DOWN, DEPRESSED OR HOPELESS: MORE THAN HALF THE DAYS
4. FEELING TIRED OR HAVING LITTLE ENERGY: NEARLY EVERY DAY
SUM OF ALL RESPONSES TO PHQ QUESTIONS 1-9: 11
SUM OF ALL RESPONSES TO PHQ9 QUESTIONS 1 & 2: 4
5. POOR APPETITE OR OVEREATING: NOT AT ALL
1. LITTLE INTEREST OR PLEASURE IN DOING THINGS: MORE THAN HALF THE DAYS
3. TROUBLE FALLING OR STAYING ASLEEP: NEARLY EVERY DAY
7. TROUBLE CONCENTRATING ON THINGS, SUCH AS READING THE NEWSPAPER OR WATCHING TELEVISION: NOT AT ALL
SUM OF ALL RESPONSES TO PHQ QUESTIONS 1-9: 11
8. MOVING OR SPEAKING SO SLOWLY THAT OTHER PEOPLE COULD HAVE NOTICED. OR THE OPPOSITE, BEING SO FIGETY OR RESTLESS THAT YOU HAVE BEEN MOVING AROUND A LOT MORE THAN USUAL: NOT AT ALL
10. IF YOU CHECKED OFF ANY PROBLEMS, HOW DIFFICULT HAVE THESE PROBLEMS MADE IT FOR YOU TO DO YOUR WORK, TAKE CARE OF THINGS AT HOME, OR GET ALONG WITH OTHER PEOPLE: NOT DIFFICULT AT ALL
9. THOUGHTS THAT YOU WOULD BE BETTER OFF DEAD, OR OF HURTING YOURSELF: NOT AT ALL
6. FEELING BAD ABOUT YOURSELF - OR THAT YOU ARE A FAILURE OR HAVE LET YOURSELF OR YOUR FAMILY DOWN: SEVERAL DAYS
SUM OF ALL RESPONSES TO PHQ QUESTIONS 1-9: 11

## 2025-02-24 ASSESSMENT — ENCOUNTER SYMPTOMS
SHORTNESS OF BREATH: 0
COUGH: 0
ABDOMINAL PAIN: 0
SORE THROAT: 0
TROUBLE SWALLOWING: 0
CONSTIPATION: 0
DIARRHEA: 0

## 2025-02-24 NOTE — PROGRESS NOTES
[facility]  Internal Medicine Residency Clinic    Attending Physician Statement  I have discussed the case, including pertinent history and exam findings with the resident physician.I have seen and examined the patient and the key elements of the encounter have been performed by me. I agree with the assessment, plan and orders as documented by the resident. I have reviewed the relevant PMHx, PSHx, FamHx, SocialHx, medications, and allergies and updated history as appropriate.    Patient presents for routine follow up of medical problems.     Left wrist pain -- suspect OA, possible DeQuervain's   + Finkelstein's test positive, we discussed options and reasonable trial of wrist exercises; if no improvement will consider referral to sports med / ortho hand    DM 2 with hx proteinuria, controlled with A1c 4.8% s/p gastric sleeve (2020)   Continues lifestyle mods   Check annual micro/alb cr    HTN    Controlled with lifestyle mods     Obesity class 3  Notable weight loss previously after sleeve    Screening:   Recommend AWV in 4 months    Remainder of medical problems as per resident note.    Mesfin Oneill DO  2/24/2025 9:40 AM

## 2025-02-24 NOTE — PROGRESS NOTES
[facility]  Internal Medicine Residency Program  ACC Note      SUBJECTIVE:  CC: had concerns including Follow-up (Here for 6 month routine visit. ), Diabetes (Has not been checking sugar at home. Off Metformin, uses diet to control.), and Wrist Pain (C/o that his left wrist has been hurting on and off for 6 months. ).    Last Visit: 4/10/24    HPI:  Maurizio De Jesus is a 45 y.o.male     Left wirst pain that occurred about 7-8 months ago. It does occasionally still cause pain and wanted to know if able to start exercises of the wrist. Will provide these today.     DM:   A1C - 4.8% on 4/10/24   Not currently on any medication   Recently saw podiatry - (Dr. David) 5/30/23  Will send referral for eye doctor   Albumin/Cr ratio - 0.05  Will recheck microalbumin before next appointment.        Hypertension   BP today 107/71  Lisinopril 2.5 mg      GERD:  On Omeprazole 20 mg  every other day       Obesity:   Follows with bariatric medicine and lat visit 11/20/24  Laparoscopic sleeve gastrectomy on 11/16/2020    Doing well and goal is 250      Anxiety:   [facility] for Vyvanx and Auvelity   Stable and doing well  Follows monthly - with Dr. Turner office and has an appointment next week     Review of Systems   Constitutional:  Negative for chills and fever.   HENT:  Negative for sore throat and trouble swallowing.    Respiratory:  Negative for cough and shortness of breath.    Cardiovascular:  Positive for leg swelling. Negative for chest pain.   Gastrointestinal:  Negative for abdominal pain, constipation and diarrhea.   Genitourinary:  Negative for difficulty urinating and dysuria.   Musculoskeletal:  Negative for arthralgias, joint swelling and myalgias.   Neurological:  Negative for dizziness and light-headedness.       Outpatient Medications Marked as Taking for the 2/24/25 encounter (Office Visit) with Celena Buckley MD   Medication Sig Dispense Refill    lisinopril

## 2025-02-24 NOTE — PATIENT INSTRUCTIONS
Dear Maurizio De Jesus,    Thank you for coming to your appointment today. I hope we have addressed all of your needs.     Please make sure to do the following:  - Continue your medications as listed.  - Get labs drawn before our next follow up.  - Referrals have been made to General surgery :  If you do not hear from the office in 1 week, please call the number listed.  - We will see each other again in 4-5 months.     Call for a sooner appointment if you develop any new or worsening symptoms.    Have a great day!    Sincerely,  Celena Buckley M.D.  2/24/2025  9:36 AM

## 2025-03-07 ENCOUNTER — TELEPHONE (OUTPATIENT)
Dept: SURGERY | Age: 45
End: 2025-03-07

## 2025-03-07 NOTE — TELEPHONE ENCOUNTER
MA left a detailed message for pt informing him that appointment with Dr. Guaman on 5/19/25 had to be rescheduled for 6/9/25 as Dr. Guaman will be on call on 5/19. Pt did not answer but VM was left with this information and MA mailed an updated appointment letter also.    Electronically signed by Bryanna May on 3/7/2025 at 12:59 PM

## 2025-05-02 DIAGNOSIS — K21.9 GASTROESOPHAGEAL REFLUX DISEASE WITHOUT ESOPHAGITIS: ICD-10-CM

## 2025-05-02 RX ORDER — OMEPRAZOLE 20 MG/1
20 CAPSULE, DELAYED RELEASE ORAL EVERY OTHER DAY
Qty: 30 CAPSULE | Refills: 2 | Status: SHIPPED | OUTPATIENT
Start: 2025-05-02

## 2025-05-02 NOTE — TELEPHONE ENCOUNTER
Name of Medication(s) Requested:  Requested Prescriptions     Pending Prescriptions Disp Refills    omeprazole (PRILOSEC) 20 MG delayed release capsule [Pharmacy Med Name: OMEPRAZOLE 20MG CAPSULES] 30 capsule 2     Sig: TAKE 1 CAPSULE BY MOUTH EVERY OTHER DAY       Medication is on current medication list Yes    Dosage and directions were verified? Yes    Quantity verified: 30 day supply     Pharmacy Verified?  Yes    Last Appointment:  2/24/2025    Future appts:  Future Appointments   Date Time Provider Department Center   6/9/2025  9:00 AM Silvana Guaman MD [facility] Surgical [facility]   6/16/2025  9:00 AM Castillo Mitchell MD [facility] BS ECC DEP   11/19/2025 11:00 AM Chey Damon, APRN - CNP Surg Weight [facility]        (If no appt send self scheduling link. .REFILLAPPT)  Scheduling request sent?     [] Yes  [x] No    Does patient need updated?  [] Yes  [x] No

## 2025-06-03 DIAGNOSIS — R80.1 PERSISTENT PROTEINURIA: ICD-10-CM

## 2025-06-03 RX ORDER — LISINOPRIL 2.5 MG/1
2.5 TABLET ORAL DAILY
Qty: 30 TABLET | Refills: 2 | Status: SHIPPED | OUTPATIENT
Start: 2025-06-03 | End: 2025-09-01

## 2025-06-09 ENCOUNTER — PREP FOR PROCEDURE (OUTPATIENT)
Age: 45
End: 2025-06-09

## 2025-06-09 ENCOUNTER — OFFICE VISIT (OUTPATIENT)
Age: 45
End: 2025-06-09

## 2025-06-09 VITALS
WEIGHT: 292 LBS | SYSTOLIC BLOOD PRESSURE: 154 MMHG | BODY MASS INDEX: 43.25 KG/M2 | DIASTOLIC BLOOD PRESSURE: 97 MMHG | HEIGHT: 69 IN | OXYGEN SATURATION: 100 % | TEMPERATURE: 98 F | HEART RATE: 104 BPM | RESPIRATION RATE: 16 BRPM

## 2025-06-09 DIAGNOSIS — Z12.11 SCREEN FOR COLON CANCER: ICD-10-CM

## 2025-06-09 DIAGNOSIS — Z12.11 ENCOUNTER FOR SCREENING COLONOSCOPY: Primary | ICD-10-CM

## 2025-06-09 RX ORDER — SODIUM, POTASSIUM,MAG SULFATES 17.5-3.13G
177 SOLUTION, RECONSTITUTED, ORAL ORAL SEE ADMIN INSTRUCTIONS
Qty: 354 ML | Refills: 0 | Status: SHIPPED | OUTPATIENT
Start: 2025-06-09

## 2025-06-09 NOTE — PROGRESS NOTES
Physical Exam  Constitutional:       Appearance: Normal appearance. He is obese.   HENT:      Head: Normocephalic and atraumatic.      Nose: Nose normal.      Mouth/Throat:      Mouth: Mucous membranes are moist.      Pharynx: Oropharynx is clear.   Eyes:      Extraocular Movements: Extraocular movements intact.      Pupils: Pupils are equal, round, and reactive to light.   Cardiovascular:      Rate and Rhythm: Normal rate and regular rhythm.      Pulses: Normal pulses.      Heart sounds: Normal heart sounds.   Pulmonary:      Effort: Pulmonary effort is normal.      Breath sounds: Normal breath sounds.   Abdominal:      General: There is no distension.      Palpations: Abdomen is soft.      Tenderness: There is no abdominal tenderness.   Musculoskeletal:         General: No tenderness or signs of injury.      Cervical back: Normal range of motion and neck supple.   Skin:     General: Skin is warm and dry.   Neurological:      General: No focal deficit present.      Mental Status: He is alert.   Psychiatric:         Mood and Affect: Mood normal.         Behavior: Behavior normal.         Thought Content: Thought content normal.         Judgment: Judgment normal.           ASSESSMENT/PLAN:   Age as a risk factor for colon cancer  -- plan for colonoscopy    Prep:  clenpiq    Colonoscopy. The patient was explained the risks/benefits/alternatives/expected outcomes of the procedure.  The patient was explained the risks of the procedure, including, but not limited to, the risk of reaction to the anesthesia medicine and the risk of perforation requiring further surgery.  The patient was informed that they may require biopsy or polypectomy. These procedures may increase the risk of complication. All questions were answered.  The patient verbalized understanding and agreed to proceed.    Silvana Guaman MD, MSc, FACS  6/9/2025  9:13 AM

## 2025-06-10 ENCOUNTER — TELEPHONE (OUTPATIENT)
Age: 45
End: 2025-06-10

## 2025-06-10 ASSESSMENT — ENCOUNTER SYMPTOMS
BACK PAIN: 0
RESPIRATORY NEGATIVE: 1
EYES NEGATIVE: 1
ABDOMINAL DISTENTION: 0
BLOOD IN STOOL: 0
ANAL BLEEDING: 0
GASTROINTESTINAL NEGATIVE: 1
DIARRHEA: 0
SHORTNESS OF BREATH: 0
CONSTIPATION: 0
COUGH: 0
NAUSEA: 0
ABDOMINAL PAIN: 0
VOMITING: 0
ALLERGIC/IMMUNOLOGIC NEGATIVE: 1

## 2025-06-10 NOTE — TELEPHONE ENCOUNTER
Patient is scheduled for Colonoscopy with   on 7/28/25 at 11 am with an arrival time of 9:45 am. Pt accepted date and time and verbalized understanding. Procedure letter mailed to patient as well.  Electronically signed by Bryanna May on 6/10/2025 at 11:13 AM

## 2025-06-14 SDOH — HEALTH STABILITY: PHYSICAL HEALTH: ON AVERAGE, HOW MANY DAYS PER WEEK DO YOU ENGAGE IN MODERATE TO STRENUOUS EXERCISE (LIKE A BRISK WALK)?: 2 DAYS

## 2025-06-14 SDOH — HEALTH STABILITY: PHYSICAL HEALTH: ON AVERAGE, HOW MANY MINUTES DO YOU ENGAGE IN EXERCISE AT THIS LEVEL?: 20 MIN

## 2025-06-14 ASSESSMENT — PATIENT HEALTH QUESTIONNAIRE - PHQ9
SUM OF ALL RESPONSES TO PHQ QUESTIONS 1-9: 7
10. IF YOU CHECKED OFF ANY PROBLEMS, HOW DIFFICULT HAVE THESE PROBLEMS MADE IT FOR YOU TO DO YOUR WORK, TAKE CARE OF THINGS AT HOME, OR GET ALONG WITH OTHER PEOPLE: SOMEWHAT DIFFICULT
1. LITTLE INTEREST OR PLEASURE IN DOING THINGS: SEVERAL DAYS
2. FEELING DOWN, DEPRESSED OR HOPELESS: MORE THAN HALF THE DAYS
SUM OF ALL RESPONSES TO PHQ QUESTIONS 1-9: 7
9. THOUGHTS THAT YOU WOULD BE BETTER OFF DEAD, OR OF HURTING YOURSELF: NOT AT ALL
4. FEELING TIRED OR HAVING LITTLE ENERGY: SEVERAL DAYS
3. TROUBLE FALLING OR STAYING ASLEEP: SEVERAL DAYS
7. TROUBLE CONCENTRATING ON THINGS, SUCH AS READING THE NEWSPAPER OR WATCHING TELEVISION: NOT AT ALL
SUM OF ALL RESPONSES TO PHQ QUESTIONS 1-9: 7
SUM OF ALL RESPONSES TO PHQ QUESTIONS 1-9: 7
6. FEELING BAD ABOUT YOURSELF - OR THAT YOU ARE A FAILURE OR HAVE LET YOURSELF OR YOUR FAMILY DOWN: MORE THAN HALF THE DAYS
5. POOR APPETITE OR OVEREATING: NOT AT ALL
8. MOVING OR SPEAKING SO SLOWLY THAT OTHER PEOPLE COULD HAVE NOTICED. OR THE OPPOSITE, BEING SO FIGETY OR RESTLESS THAT YOU HAVE BEEN MOVING AROUND A LOT MORE THAN USUAL: NOT AT ALL

## 2025-06-14 ASSESSMENT — LIFESTYLE VARIABLES
HOW MANY STANDARD DRINKS CONTAINING ALCOHOL DO YOU HAVE ON A TYPICAL DAY: 1 OR 2
HOW OFTEN DO YOU HAVE SIX OR MORE DRINKS ON ONE OCCASION: 1
HOW MANY STANDARD DRINKS CONTAINING ALCOHOL DO YOU HAVE ON A TYPICAL DAY: 1
HOW OFTEN DO YOU HAVE A DRINK CONTAINING ALCOHOL: MONTHLY OR LESS
HOW OFTEN DO YOU HAVE A DRINK CONTAINING ALCOHOL: 2

## 2025-06-15 NOTE — PROGRESS NOTES
[facility]  Internal Medicine Residency Program  ACC Not       Medicare Annual Wellness Visit    Maurizio De Jesus is here for Medicare AWV    Assessment & Plan   Screening due  Persistent proteinuria  Immunization due       Return in about 3 months (around 9/16/2025) for PCP follow up.     Subjective   The following acute and/or chronic problems were also addressed today:    For AWV.  He reports no new complaints today  Still follows with bariatric surgery, has labs pending will do before visit  Scheduled for screening colonoscopy  Will call podiatry, ophthalmology and dentist to schedule annual checkups  [facility] 5/5    Patient's complete Health Risk Assessment and screening values have been reviewed and are found in Flowsheets. The following problems were reviewed today and where indicated follow up appointments were made and/or referrals ordered.    Positive Risk Factor Screenings with Interventions:    Fall Risk:  Do you feel unsteady or are you worried about falling? : (Patient-Rptd) no  2 or more falls in past year?: (Patient-Rptd) no  Fall with injury in past year?: (!) (Patient-Rptd) yes     Interventions:    Reviewed medications, home hazards, visual acuity, and co-morbidities that can increase risk for falls  Patient declines any further evaluation or treatment     Depression:  PHQ-2 Score: 2  PHQ-9 Total Score: 6  Total Score Interpretation: 5-9 = mild depression  Interventions:  Monitored by specialist. No acute findings meriting change in the plan  Sees psychiatrist monthly for ADHD and depression  Symptoms improving as per patient           Inactivity:  On average, how many days per week do you engage in moderate to strenuous exercise (like a brisk walk)?: (Patient-Rptd) 2 days (!) Abnormal  On average, how many minutes do you engage in exercise at this level?: (Patient-Rptd) 20 min  Interventions:  Patient comments: Plays games  Have online friends  Encouraged more socializing

## 2025-06-16 ENCOUNTER — OFFICE VISIT (OUTPATIENT)
Age: 45
End: 2025-06-16
Payer: MEDICARE

## 2025-06-16 VITALS
TEMPERATURE: 97.8 F | HEIGHT: 69 IN | DIASTOLIC BLOOD PRESSURE: 78 MMHG | SYSTOLIC BLOOD PRESSURE: 135 MMHG | BODY MASS INDEX: 42.8 KG/M2 | RESPIRATION RATE: 18 BRPM | HEART RATE: 95 BPM | WEIGHT: 289 LBS | OXYGEN SATURATION: 97 %

## 2025-06-16 DIAGNOSIS — Z23 IMMUNIZATION DUE: ICD-10-CM

## 2025-06-16 DIAGNOSIS — Z00.00 INITIAL MEDICARE ANNUAL WELLNESS VISIT: Primary | ICD-10-CM

## 2025-06-16 DIAGNOSIS — Z13.9 SCREENING DUE: ICD-10-CM

## 2025-06-16 DIAGNOSIS — R80.1 PERSISTENT PROTEINURIA: ICD-10-CM

## 2025-06-16 LAB — HBA1C MFR BLD: 4.9 %

## 2025-06-16 PROCEDURE — 3075F SYST BP GE 130 - 139MM HG: CPT

## 2025-06-16 PROCEDURE — 83036 HEMOGLOBIN GLYCOSYLATED A1C: CPT

## 2025-06-16 PROCEDURE — PBSHW PNEUMOCOCCAL, PCV20, PREVNAR 20, (AGE 6W+), IM, PF: Performed by: INTERNAL MEDICINE

## 2025-06-16 PROCEDURE — 3078F DIAST BP <80 MM HG: CPT

## 2025-06-16 PROCEDURE — 90677 PCV20 VACCINE IM: CPT | Performed by: INTERNAL MEDICINE

## 2025-06-16 PROCEDURE — PBSHW POCT GLYCOSYLATED HEMOGLOBIN (HGB A1C)

## 2025-06-16 PROCEDURE — G0439 PPPS, SUBSEQ VISIT: HCPCS

## 2025-06-16 ASSESSMENT — PATIENT HEALTH QUESTIONNAIRE - PHQ9
SUM OF ALL RESPONSES TO PHQ QUESTIONS 1-9: 6
10. IF YOU CHECKED OFF ANY PROBLEMS, HOW DIFFICULT HAVE THESE PROBLEMS MADE IT FOR YOU TO DO YOUR WORK, TAKE CARE OF THINGS AT HOME, OR GET ALONG WITH OTHER PEOPLE: NOT DIFFICULT AT ALL
8. MOVING OR SPEAKING SO SLOWLY THAT OTHER PEOPLE COULD HAVE NOTICED. OR THE OPPOSITE, BEING SO FIGETY OR RESTLESS THAT YOU HAVE BEEN MOVING AROUND A LOT MORE THAN USUAL: NOT AT ALL
SUM OF ALL RESPONSES TO PHQ QUESTIONS 1-9: 6
9. THOUGHTS THAT YOU WOULD BE BETTER OFF DEAD, OR OF HURTING YOURSELF: NOT AT ALL
1. LITTLE INTEREST OR PLEASURE IN DOING THINGS: SEVERAL DAYS
7. TROUBLE CONCENTRATING ON THINGS, SUCH AS READING THE NEWSPAPER OR WATCHING TELEVISION: NOT AT ALL
2. FEELING DOWN, DEPRESSED OR HOPELESS: SEVERAL DAYS
SUM OF ALL RESPONSES TO PHQ QUESTIONS 1-9: 6
4. FEELING TIRED OR HAVING LITTLE ENERGY: SEVERAL DAYS
3. TROUBLE FALLING OR STAYING ASLEEP: NOT AT ALL
6. FEELING BAD ABOUT YOURSELF - OR THAT YOU ARE A FAILURE OR HAVE LET YOURSELF OR YOUR FAMILY DOWN: NEARLY EVERY DAY
5. POOR APPETITE OR OVEREATING: NOT AT ALL
SUM OF ALL RESPONSES TO PHQ QUESTIONS 1-9: 6

## 2025-06-16 NOTE — PROGRESS NOTES
[facility]  Internal Medicine Residency Clinic    Attending Physician Statement  I have discussed the case, including pertinent history and exam findings with the resident physician.  I agree with the assessment, plan and orders as documented by the resident. I have reviewed all pertinent PMHx, PSHx, FamHx, SocialHx, medications, and allergies and updated history as appropriate.    Patient here for routine follow up of medical problems. And AWV    AWV  -[facility] 5/5  -MDD: following with psychiatry and is stable   -falls and unsteadiness; medication and blood work evlauation; patient not interested in PT/OT; patient to followup with specialists   -colonoscopy ordered   -screening blood work     NIDDM2  -patinet not taking any medications  -A1c recheck today   -screening tsting and blood work     FILMOENA  -stable and conitnue supplementation     HTN  -contorlled; The current medical regimen is effective;  continue present plan and medications.    Remainder of medical problems as per resident note.    Tj Diaz Jr, DO  6/16/25

## 2025-06-16 NOTE — PATIENT INSTRUCTIONS
decisions by themselves.  For more information, including forms for your state, see the [employer] website (www.caringinfo.org/planning/advance-directives/).  Follow-up care is a key part of your treatment and safety. Be sure to make and go to all appointments, and call your doctor if you are having problems. It's also a good idea to know your test results and keep a list of the medicines you take.  What should you include in an advance directive?  Many states have a unique advance directive form. (It may ask you to address specific issues.) Or you might use a universal form that's approved by many states.  If your form doesn't tell you what to address, it may be hard to know what to include in your advance directive. Use the questions below to help you get started.  Who do you want to make decisions about your medical care if you are not able to?  What life-support measures do you want if you have a serious illness that gets worse over time or can't be cured?  What are you most afraid of that might happen? (Maybe you're afraid of having pain, losing your independence, or being kept alive by machines.)  Where would you prefer to die? (Your home? A hospital? A nursing home?)  Do you want to donate your organs when you die?  Do you want certain Scientologist practices performed before you die?  When should you call for help?  Be sure to contact your doctor if you have any questions.  Where can you learn more?  Go to https://[email or web address]/patientEd and enter R264 to learn more about \"Advance Directives: Care Instructions.\"  Current as of: April 30, 2024  Content Version: 14.5  © 0422-4462 Immediately.   Care instructions adapted under license by [employer]. If you have questions about a medical condition or this instruction, always ask your healthcare professional. [employer] disclaims any warranty or liability for your use of this information.         A Healthy [employer]: Care

## 2025-07-09 PROBLEM — Z12.11 SCREEN FOR COLON CANCER: Status: RESOLVED | Noted: 2025-06-09 | Resolved: 2025-07-09

## 2025-07-21 RX ORDER — ATOMOXETINE 40 MG/1
40 CAPSULE ORAL DAILY
COMMUNITY

## 2025-07-23 RX ORDER — SODIUM CHLORIDE 0.9 % (FLUSH) 0.9 %
5-40 SYRINGE (ML) INJECTION PRN
Status: CANCELLED | OUTPATIENT
Start: 2025-07-23

## 2025-07-23 RX ORDER — SODIUM CHLORIDE 9 MG/ML
25 INJECTION, SOLUTION INTRAVENOUS PRN
Status: CANCELLED | OUTPATIENT
Start: 2025-07-23

## 2025-07-23 RX ORDER — SODIUM CHLORIDE 9 MG/ML
INJECTION, SOLUTION INTRAVENOUS CONTINUOUS
Status: CANCELLED | OUTPATIENT
Start: 2025-07-23

## 2025-07-23 RX ORDER — SODIUM CHLORIDE 0.9 % (FLUSH) 0.9 %
5-40 SYRINGE (ML) INJECTION EVERY 12 HOURS SCHEDULED
Status: CANCELLED | OUTPATIENT
Start: 2025-07-23

## 2025-07-28 ENCOUNTER — ANESTHESIA (OUTPATIENT)
Dept: ENDOSCOPY | Age: 45
End: 2025-07-28
Payer: MEDICARE

## 2025-07-28 ENCOUNTER — HOSPITAL ENCOUNTER (OUTPATIENT)
Age: 45
Setting detail: OUTPATIENT SURGERY
Discharge: HOME | End: 2025-07-28
Attending: SURGERY | Admitting: SURGERY
Payer: MEDICARE

## 2025-07-28 ENCOUNTER — ANESTHESIA EVENT (OUTPATIENT)
Dept: ENDOSCOPY | Age: 45
End: 2025-07-28
Payer: MEDICARE

## 2025-07-28 VITALS
OXYGEN SATURATION: 98 % | SYSTOLIC BLOOD PRESSURE: 105 MMHG | HEIGHT: 69 IN | WEIGHT: 280 LBS | BODY MASS INDEX: 41.47 KG/M2 | HEART RATE: 74 BPM | RESPIRATION RATE: 20 BRPM | DIASTOLIC BLOOD PRESSURE: 36 MMHG | TEMPERATURE: 98 F

## 2025-07-28 DIAGNOSIS — Z01.812 PRE-OPERATIVE LABORATORY EXAMINATION: Primary | ICD-10-CM

## 2025-07-28 PROBLEM — Z12.11 SCREEN FOR COLON CANCER: Status: ACTIVE | Noted: 2025-06-09

## 2025-07-28 LAB — GLUCOSE BLD-MCNC: 93 MG/DL (ref 74–99)

## 2025-07-28 PROCEDURE — 3609027000 HC COLONOSCOPY: Performed by: SURGERY

## 2025-07-28 PROCEDURE — 7100000010 HC PHASE II RECOVERY - FIRST 15 MIN: Performed by: SURGERY

## 2025-07-28 PROCEDURE — 3700000001 HC ADD 15 MINUTES (ANESTHESIA): Performed by: SURGERY

## 2025-07-28 PROCEDURE — 82962 GLUCOSE BLOOD TEST: CPT

## 2025-07-28 PROCEDURE — 7100000011 HC PHASE II RECOVERY - ADDTL 15 MIN: Performed by: SURGERY

## 2025-07-28 PROCEDURE — G0121 COLON CA SCRN NOT HI RSK IND: HCPCS | Performed by: SURGERY

## 2025-07-28 PROCEDURE — 6370000000 HC RX 637 (ALT 250 FOR IP): Performed by: SURGERY

## 2025-07-28 PROCEDURE — 2580000003 HC RX 258: Performed by: SURGERY

## 2025-07-28 PROCEDURE — 2580000003 HC RX 258

## 2025-07-28 PROCEDURE — 3700000000 HC ANESTHESIA ATTENDED CARE: Performed by: SURGERY

## 2025-07-28 PROCEDURE — 6360000002 HC RX W HCPCS

## 2025-07-28 PROCEDURE — 2709999900 HC NON-CHARGEABLE SUPPLY: Performed by: SURGERY

## 2025-07-28 RX ORDER — PROPOFOL 10 MG/ML
INJECTION, EMULSION INTRAVENOUS
Status: DISCONTINUED | OUTPATIENT
Start: 2025-07-28 | End: 2025-07-28 | Stop reason: SDUPTHER

## 2025-07-28 RX ORDER — SODIUM CHLORIDE 9 MG/ML
25 INJECTION, SOLUTION INTRAVENOUS PRN
Status: DISCONTINUED | OUTPATIENT
Start: 2025-07-28 | End: 2025-07-28 | Stop reason: HOSPADM

## 2025-07-28 RX ORDER — SODIUM CHLORIDE 0.9 % (FLUSH) 0.9 %
5-40 SYRINGE (ML) INJECTION EVERY 12 HOURS SCHEDULED
Status: DISCONTINUED | OUTPATIENT
Start: 2025-07-28 | End: 2025-07-28 | Stop reason: HOSPADM

## 2025-07-28 RX ORDER — SIMETHICONE 40MG/0.6ML
SUSPENSION, DROPS(FINAL DOSAGE FORM)(ML) ORAL PRN
Status: DISCONTINUED | OUTPATIENT
Start: 2025-07-28 | End: 2025-07-28 | Stop reason: ALTCHOICE

## 2025-07-28 RX ORDER — SODIUM CHLORIDE 9 MG/ML
INJECTION, SOLUTION INTRAVENOUS CONTINUOUS
Status: DISCONTINUED | OUTPATIENT
Start: 2025-07-28 | End: 2025-07-28 | Stop reason: HOSPADM

## 2025-07-28 RX ORDER — SODIUM CHLORIDE 0.9 % (FLUSH) 0.9 %
5-40 SYRINGE (ML) INJECTION PRN
Status: DISCONTINUED | OUTPATIENT
Start: 2025-07-28 | End: 2025-07-28 | Stop reason: HOSPADM

## 2025-07-28 RX ORDER — SODIUM CHLORIDE 9 MG/ML
INJECTION, SOLUTION INTRAVENOUS
Status: DISCONTINUED | OUTPATIENT
Start: 2025-07-28 | End: 2025-07-28 | Stop reason: SDUPTHER

## 2025-07-28 RX ORDER — FENTANYL CITRATE 50 UG/ML
INJECTION, SOLUTION INTRAMUSCULAR; INTRAVENOUS
Status: DISCONTINUED | OUTPATIENT
Start: 2025-07-28 | End: 2025-07-28 | Stop reason: SDUPTHER

## 2025-07-28 RX ADMIN — PROPOFOL 500 MG: 10 INJECTION, EMULSION INTRAVENOUS at 11:10

## 2025-07-28 RX ADMIN — PHENYLEPHRINE HYDROCHLORIDE 150 MCG: 10 INJECTION INTRAVENOUS at 11:27

## 2025-07-28 RX ADMIN — SODIUM CHLORIDE: 0.9 INJECTION, SOLUTION INTRAVENOUS at 11:08

## 2025-07-28 RX ADMIN — PHENYLEPHRINE HYDROCHLORIDE 100 MCG: 10 INJECTION INTRAVENOUS at 11:19

## 2025-07-28 RX ADMIN — SODIUM CHLORIDE: 0.9 INJECTION, SOLUTION INTRAVENOUS at 10:16

## 2025-07-28 RX ADMIN — FENTANYL CITRATE 25 MCG: 50 INJECTION, SOLUTION INTRAMUSCULAR; INTRAVENOUS at 11:18

## 2025-07-28 ASSESSMENT — PAIN - FUNCTIONAL ASSESSMENT: PAIN_FUNCTIONAL_ASSESSMENT: 0-10

## 2025-07-28 ASSESSMENT — LIFESTYLE VARIABLES: SMOKING_STATUS: 0

## 2025-07-28 NOTE — ANESTHESIA POSTPROCEDURE EVALUATION
Department of Anesthesiology  Postprocedure Note    Patient: Maurizio De Jesus  MRN: [id number]  YOB: 1980  Date of evaluation: 7/28/2025    Procedure Summary       Date: 07/28/25 Room / Location: [place] [address] / [facility]    Anesthesia Start: 1108 Anesthesia Stop: 1131    Procedure: COLORECTAL CANCER SCREENING, NOT HIGH RISK Diagnosis:       Screen for colon cancer      (Screen for colon cancer [Z12.11])    Surgeons: Silvana Guaman MD Responsible Provider: Vic Copeland DO    Anesthesia Type: MAC ASA Status: 3            Anesthesia Type: No value filed.    Argenis Phase I: Argenis Score: 10    Argenis Phase II:      Anesthesia Post Evaluation    Patient location during evaluation: PACU  Patient participation: complete - patient participated  Level of consciousness: awake and alert  Pain score: 1  Airway patency: patent  Nausea & Vomiting: no nausea and no vomiting  Cardiovascular status: hemodynamically stable  Respiratory status: acceptable  Hydration status: euvolemic  Pain management: adequate    No notable events documented.

## 2025-07-28 NOTE — ANESTHESIA PRE PROCEDURE
Department of Anesthesiology  Preprocedure Note       Name:  Maurizio De Jesus   Age:  45 y.o.  :  1980                                          MRN:  [id number]         Date:  2025      Surgeon: Surgeon(s):  Silvana Guaman MD    Procedure: Procedure(s):  COLORECTAL CANCER SCREENING, NOT HIGH RISK    Medications prior to admission:   Prior to Admission medications    Medication Sig Start Date End Date Taking? Authorizing Provider   atomoxetine (STRATTERA) 40 MG capsule Take 1 capsule by mouth daily   Yes Hina Da Silva MD   sodium-potassium-mag sulfate (SUPREP) 17.5-3.13-1.6 GM/177ML SOLN solution Take 177 mLs by mouth See Admin Instructions for 2 doses 25  Yes Silvana Guaman MD   lisinopril (PRINIVIL;ZESTRIL) 2.5 MG tablet Take 1 tablet by mouth daily 6/3/25 9/1/25 Yes Fernando Caal MD   omeprazole (PRILOSEC) 20 MG delayed release capsule TAKE 1 CAPSULE BY MOUTH EVERY OTHER DAY 25  Yes Tristan Rhodes MD   AUVELITY  MG TBCR Take 1 tablet by mouth 2 times daily 3/25/24  Yes Hina Da Silva MD   VYVANSE 60 MG CAPS Take 1 capsule by mouth daily. Max Daily Amount: 1 capsule 3/22/24  Yes Hina Da Silva MD   Multiple Vitamin (MVI, BARIATRIC ADVANTAGE MULTI-FORMULA, CHEW TAB) Take 1 tablet by mouth daily   Yes Hina Da Silva MD   Calcium Carbonate (CALCI-CHEW PO) Take 1 tablet by mouth 3 times daily   Yes Hina Da Silva MD   Ferrous Sulfate (IRON PO) Take 1 tablet by mouth daily   Yes Hina Da Silva MD   Cholecalciferol (VITAMIN D3) 125 MCG (5000 UT) TABS Take 1 tablet by mouth daily   Yes Hina Da Silva MD   LORATADINE PO Take 10 mg by mouth as needed   Yes Provider, Historical, MD   Misc. Devices KIT Dispense 1 blood pressure cuff. 22   Josiah Ramos MD Misc. Devices MISC Give one Accucheck glucometer monitor or whatever the insurance approves. 17   Trino iVdes MD       Current medications:    Current

## 2025-07-28 NOTE — H&P
GENERAL SURGERY  H&P NOTE  7/28/2025      HPI  Maurizio De Jesus is a 45 y.o. male with history of HTN, DM who presents for initial screening colonoscopy.  Patient denies any prior colonoscopies.  Patient denies any personal or family history of colon cancer.  Patient states he does have a history of Crohn's disease in his maternal uncles x 2 but denies any personal history of inflammatory bowel disease.  Patient denies any recent nausea, vomiting, melena, hematochezia, hematemesis.  Patient denies any recent changes in weight.  Patient denies any current anticoagulation/antiplatelet use.      Past Medical History:   Diagnosis Date    Benign essential HTN     Depression     Diabetes mellitus type 2 in obese     GERD without esophagitis     Hyperlipemia     Latex allergy     Morbid obesity due to excess calories (HCC)     WILL on CPAP     10    Sleep apnea     setting 9-10       Past Surgical History:   Procedure Laterality Date    CHOLECYSTECTOMY, LAPAROSCOPIC N/A 1/24/2021    CHOLECYSTECTOMY LAPAROSCOPIC [place] [facility] [place]    SLEEVE GASTRECTOMY N/A 11/16/2020    GASTRECTOMY SLEEVE LAPAROSCOPIC performed by Dean Perez MD at [facility] OR    UPPER GASTROINTESTINAL ENDOSCOPY N/A 6/19/2020    EGD BIOPSY performed by Dean Perez MD at [facility] ENDOSCOPY       Medications Prior to Admission:    Prior to Admission medications    Medication Sig Start Date End Date Taking? Authorizing Provider   atomoxetine (STRATTERA) 40 MG capsule Take 1 capsule by mouth daily   Yes ProviderHina MD   sodium-potassium-mag sulfate (SUPREP) 17.5-3.13-1.6 GM/177ML SOLN solution Take 177 mLs by mouth See Admin Instructions for 2 doses 6/9/25  Yes Silvana Guaman MD   lisinopril (PRINIVIL;ZESTRIL) 2.5 MG tablet Take 1 tablet by mouth daily 6/3/25 9/1/25 Yes Fernando Caal MD   omeprazole (PRILOSEC) 20 MG delayed release capsule TAKE 1 CAPSULE BY MOUTH EVERY OTHER DAY 5/2/25  Yes Tristan Rhodes MD   [facility]

## (undated) DEVICE — INSUFFLATION TUBING SET WITH FILTER, FUNNEL CONNECTOR AND LUER LOCK: Brand: JOSNOE MEDICAL INC

## (undated) DEVICE — SURGICAL PROCEDURE PACK BASIC

## (undated) DEVICE — MASK,FACE,MAXFLUIDPROTECT,SHIELD/ERLPS: Brand: MEDLINE

## (undated) DEVICE — TROCAR: Brand: KII SLEEVE

## (undated) DEVICE — CAMERA STRYKER 1488 HD GEN

## (undated) DEVICE — ELECTRODE ES 36CM LAP FLAT L HK COAT DISP CLEANCOAT

## (undated) DEVICE — SOLUTION IRRIG 250ML STRL H2O PLAS POUR BTL USP

## (undated) DEVICE — SYRINGE MED 50ML LUERSLIP TIP

## (undated) DEVICE — EXTRA LONG 45 DEGREE LENS

## (undated) DEVICE — DISSECTOR SONICISION CRV JAW 5MM-48CM

## (undated) DEVICE — SET ENDO INSTR LAPAROSCOPIC STGENLAP

## (undated) DEVICE — PATIENT RETURN ELECTRODE, SINGLE-USE, CONTACT QUALITY MONITORING, ADULT, WITH 9FT CORD, FOR PATIENTS WEIGING OVER 33LBS. (15KG): Brand: MEGADYNE

## (undated) DEVICE — Z INACTIVE USE 2660664 SOLUTION IRRIG 3000ML 0.9% SOD CHL USP UROMATIC PLAS CONT

## (undated) DEVICE — APPLICATOR MEDICATED 26 CC SOLUTION HI LT ORNG CHLORAPREP

## (undated) DEVICE — VALVE SUCTION AIR H2O HYDR H2O JET CONN STRL ORCA POD + DISP

## (undated) DEVICE — SPONGE GZ 4IN 4IN 4 PLY N WVN AVANT

## (undated) DEVICE — TOWEL,OR,DSP,ST,BLUE,STD,6/PK,12PK/CS: Brand: MEDLINE

## (undated) DEVICE — PLUMEPORT LAPAROSCOPIC SMOKE FILTRATION DEVICE: Brand: PLUMEPORT ACTIV

## (undated) DEVICE — SYRINGE 20ML LL S/C 50

## (undated) DEVICE — RELOAD STPL H4.1X2MM DIA60MM THCK TISS GRN 6 ROW PWR GST B

## (undated) DEVICE — INSUFFLATION NEEDLE TO ESTABLISH PNEUMOPERITONEUM.: Brand: INSUFFLATION NEEDLE

## (undated) DEVICE — TROCAR: Brand: KII® SLEEVE

## (undated) DEVICE — STAPLER SKIN L440MM 32MM LNG 12 FIRING B FRM PWR + GRIPPING

## (undated) DEVICE — COVER,TABLE,44X90,STERILE: Brand: MEDLINE

## (undated) DEVICE — GLOVE ORANGE PI 8   MSG9080

## (undated) DEVICE — CONTAINER SPEC 480ML CLR POLYSTYR 10% NEUT BUFF FRMLN ZN

## (undated) DEVICE — PUMP SUC IRR TBNG L10FT W/ HNDPC ASSEMB STRYKEFLOW 2

## (undated) DEVICE — SUTURE DEV SZ 0 L6IN N ABSORBABLE

## (undated) DEVICE — TIBURON GENERAL ENDOSCOPY DRAPE: Brand: CONVERTORS

## (undated) DEVICE — NEEDLE HYPO 25GA L1.5IN BLU POLYPR HUB S STL REG BVL STR

## (undated) DEVICE — DEFENDO AIR WATER SUCTION AND BIOPSY VALVE KIT: Brand: DEFENDO AIR/WATER/SUCTION AND BIOPSY VALVE

## (undated) DEVICE — ELECTRODE PT RET AD L9FT HI MOIST COND ADH HYDRGEL CORDED

## (undated) DEVICE — PITCHER PT 1200ML W HNDL CSR WRP

## (undated) DEVICE — MARKER,SKIN,WI/RULER AND LABELS: Brand: MEDLINE

## (undated) DEVICE — AGENT HEMSTAT W2XL4IN OXIDIZED REGENERATED CELOS ABSRB

## (undated) DEVICE — APPLIER CLP L SHFT DIA12MM 20 ROT MULT LIGACLP

## (undated) DEVICE — MEDI-VAC YANKAUER SUCTION HANDLE W/BULBOUS TIP: Brand: CARDINAL HEALTH

## (undated) DEVICE — KIT BEDSIDE REVITAL OX 500ML

## (undated) DEVICE — TROCAR ENDOSCP L100MM DIA15MM BLDELSS STBL SL ENDOPATH XCEL

## (undated) DEVICE — RELOAD STPL L60MM H2.3-4.2MM VERY THCK TISS BLK 6 ROW

## (undated) DEVICE — KIT,ANTI FOG,W/SPONGE & FLUID,SOFT PACK: Brand: MEDLINE

## (undated) DEVICE — GLOVE ORANGE PI 7 1/2   MSG9075

## (undated) DEVICE — COVER,LIGHT HANDLE,FLX,1/PK: Brand: MEDLINE INDUSTRIES, INC.

## (undated) DEVICE — BLOCK BITE 60FR CAREGUARD

## (undated) DEVICE — SET INSTRUMENT LAP I

## (undated) DEVICE — FORCEPS BX L240CM JAW DIA2.8MM L CAP W/ NDL MIC MESH TOOTH

## (undated) DEVICE — GOWN ISOLATN REG YEL M WT MULTIPLY SIDETIE LEV 2

## (undated) DEVICE — KENDALL 450 SERIES MONITORING FOAM ELECTRODE - RECTANGULAR SHAPE ( 3/PK): Brand: KENDALL

## (undated) DEVICE — MEDI-VAC NON-CONDUCTIVE SUCTION TUBING: Brand: CARDINAL HEALTH

## (undated) DEVICE — IRON INTERN

## (undated) DEVICE — SET ENDO INSTR LAPAROSCOPIC INCISIONAL

## (undated) DEVICE — STANDARD HYPODERMIC NEEDLE,ALUMINUM HUB: Brand: MONOJECT

## (undated) DEVICE — WARMER SCP LAP

## (undated) DEVICE — SCISSORS ENDOSCP DIA5MM CRV MPLR CAUT W/ RATCH HNDL

## (undated) DEVICE — GAUZE,SPONGE,4"X4",8PLY,STRL,LF,10/TRAY: Brand: MEDLINE

## (undated) DEVICE — TROCAR: Brand: KII FIOS FIRST ENTRY

## (undated) DEVICE — SYRINGE MED 10ML TRNSLUC BRL PLUNG BLK MRK POLYPR CTRL

## (undated) DEVICE — GARMENT,MEDLINE,DVT,INT,CALF,MED, GEN2: Brand: MEDLINE

## (undated) DEVICE — PMI PTFE COATED LAPAROSCOPIC WIRE L-HOOK 44 CM: Brand: PMI

## (undated) DEVICE — RELOAD STPL L60MM H1.5-3.6MM REG TISS BLU GRIPPING SURF B

## (undated) DEVICE — AIRLIFE™ ADULT OXYGEN MASK VINYL, UNDER THE CHIN STYLE, 3 IN 1 MASK WITH SAFETY VENT, WITH 7 FEET (2.1 M) CRUSH RESISTANT OXYGEN TUBING: Brand: AIRLIFE™

## (undated) DEVICE — CONTAINER SPEC COLL 960ML POLYPR TRIANG GRAD INTAKE/OUTPUT

## (undated) DEVICE — DOUBLE BASIN SET: Brand: MEDLINE INDUSTRIES, INC.

## (undated) DEVICE — CONNECTOR IRRIGATION AUXILIARY H2O JET W/ PRT MTL THRD HYDR

## (undated) DEVICE — 6 X 9  1.75MIL 4-WALL LABGUARD: Brand: MINIGRIP COMMERCIAL LLC

## (undated) DEVICE — LAPAROSCOPIC SCISSORS: Brand: EPIX LAPAROSCOPIC SCISSORS

## (undated) DEVICE — INTENDED FOR TISSUE SEPARATION, AND OTHER PROCEDURES THAT REQUIRE A SHARP SURGICAL BLADE TO PUNCTURE OR CUT.: Brand: BARD-PARKER ® STAINLESS STEEL BLADES

## (undated) DEVICE — TUBING, SUCTION, 1/4" X 10', STRAIGHT: Brand: MEDLINE

## (undated) DEVICE — SOLUTION IV IRRIG POUR BRL 0.9% SODIUM CHL 2F7124

## (undated) DEVICE — PACK SURG LAP CHOLE CUSTOM

## (undated) DEVICE — LUBRICANT SURG JELLY ST BACTER TUBE 4.25OZ

## (undated) DEVICE — AIR/WATER CLEANING ADAPTER FOR OLYMPUS® GI ENDOSCOPE: Brand: BULLDOG®

## (undated) DEVICE — GOWN,SIRUS,NONRNF,SETINSLV,XL,20/CS: Brand: MEDLINE

## (undated) DEVICE — NDL CNTR 40CT FM MAG: Brand: MEDLINE INDUSTRIES, INC.

## (undated) DEVICE — Z DUP USE 2257490 ADHESIVE SKIN CLSRE 036ML TPCL 2CTL CNCRLTE HIGH VSCSTY DRMB

## (undated) DEVICE — SHEARS LAP L45CM DIA5MM ULTRASONIC CRV TIP ADV HEMSTAS HARM